# Patient Record
Sex: MALE | Race: ASIAN | NOT HISPANIC OR LATINO
[De-identification: names, ages, dates, MRNs, and addresses within clinical notes are randomized per-mention and may not be internally consistent; named-entity substitution may affect disease eponyms.]

---

## 2024-09-19 VITALS
HEART RATE: 72 BPM | TEMPERATURE: 97 F | DIASTOLIC BLOOD PRESSURE: 65 MMHG | SYSTOLIC BLOOD PRESSURE: 123 MMHG | OXYGEN SATURATION: 99 % | WEIGHT: 141.98 LBS | HEIGHT: 66 IN

## 2024-09-19 NOTE — H&P ADULT - HISTORY OF PRESENT ILLNESS
INCOMPLETE    Cardiologist:  Dr. Vazquez  Pharmacy:   Escort:     58M PMHx HTN, HLD, DM-2, CAD, severe MR/prolapse s/p MVR 2011 ([facility]),  who presented to outpt cardiologist endorsing intermittent substernal chest pressure, non-radiating, occurring upon moderate physical exertion, which improves w/ rest, that has been gradually worsening over the past few weeks. Pt also reported dizziness.   Pt _______ SOB, ELLIOTT,  palpitations, diaphoresis, orthopnea/PND, cough, leg swelling, LOC, fall, syncope, N/V/D, fever/chills/sick contact, rash, hematuria, BRBPR, melena/hematochezia.    -- 7/21/24 TTE: LVEF 64%, Severe MS (MV area 0/87cm2; mean gradient 15mmHg), mild MR/TR, mod dil LA, mild pulmHTN (PASP 46mmHg).   -- 8/11/24 Stress Echo: Negative stress echo for myocardial ischemia. Increased LV filling pressures at rest and post-stress. LVEF 60% at rest and 75% with stress. INCOMPLETE    Cardiologist:  Dr. Vazquez  Pharmacy:   Escort:     ***needs MARLEEN *******    58M, _____ speaking, PMHx HTN, HLD, DM-2, CAD, severe MR/prolapse s/p MV repair (2011 @[facility]),  who presented to outpt cardiologist endorsing intermittent substernal chest pressure, non-radiating, occurring upon moderate physical exertion, which improves w/ rest, that has been gradually worsening over the past few weeks. Pt also reported SOB/ELLIOTT with light exertion, and dizziness/lightheadedness.   Pt _______ palpitations, diaphoresis, orthopnea/PND, cough, leg swelling, LOC, fall, syncope, N/V/D, fever/chills/sick contact, rash, hematuria, BRBPR, melena/hematochezia.    -- 7/21/24 TTE: LVEF 64%; s/p MV Ring with Severe MS (MV area 0/87cm2; mean gradient 15mmHg); mild MR/TR; mod dil LA, bowing of intra-atrial septum to left; mild pulmHTN (PASP 46mmHg).   -- 8/11/24 Stress Echo: Negative stress echo for myocardial ischemia. Normal wall motion; no arrythmia; pt achieved protocol.  Increased PASP and LV filling pressures at rest and post-stress. LVEF 60% at rest and 75% with stress.  -- 07/2024 Carotid US per MD note: mild plaque, patent flows b/l     In light of patient's risk factors, abnormal Stress Echo results, and CCS class III anginal/anginal-equivalent symptoms, patient is referred to [facility] for cardiac catheterization w/ possible intervention if clinically indicated and MARLEEN for preoperative MVR workup. Cardiologist:  Dr. Vazquez  Pharmacy: [facility]  Escort: son/wife    58M PMHx HTN, HLD, DM-2, CAD, severe MR/prolapse s/p MV repair (2011 @[facility]),  who presented to outpt cardiologist endorsing intermittent substernal chest pressure, non-radiating, occurring upon moderate physical exertion, which improves w/ rest, that has been gradually worsening over the past few weeks. Pt also reported SOB/ELLIOTT with light exertion, and dizziness/lightheadedness.   Pt denies palpitations, diaphoresis, orthopnea/PND, cough, leg swelling, LOC, fall, syncope, N/V/D, fever/chills/sick contact, rash, hematuria, BRBPR, melena/hematochezia.    -- 7/21/24 TTE: LVEF 64%; s/p MV Ring with Severe MS (MV area 0/87cm2; mean gradient 15mmHg); mild MR/TR; mod dil LA, bowing of intra-atrial septum to left; mild pulmHTN (PASP 46mmHg).   -- 8/11/24 Stress Echo: Negative stress echo for myocardial ischemia. Normal wall motion; no arrythmia; pt achieved protocol.  Increased PASP and LV filling pressures at rest and post-stress. LVEF 60% at rest and 75% with stress.  -- 07/2024 Carotid US per MD note: mild plaque, patent flows b/l     In light of patient's risk factors, abnormal Stress Echo results, and CCS class III anginal/anginal-equivalent symptoms, patient is referred to [facility] for cardiac catheterization w/ possible intervention if clinically indicated and MARLEEN for preoperative MVR workup.

## 2024-09-19 NOTE — H&P ADULT - NSICDXPASTMEDICALHX_GEN_ALL_CORE_FT
PAST MEDICAL HISTORY:  CAD (coronary artery disease)     DM2 (diabetes mellitus, type 2)     HLD (hyperlipidemia)     HTN (hypertension)     Severe mitral valve stenosis

## 2024-09-19 NOTE — H&P ADULT - CARDIOVASCULAR
normal/regular rate and rhythm/S1 S2 present/no gallops/no rub/no murmur/vascular normal/regular rate and rhythm/S1 S2 present/no gallops/no rub/murmur/vascular

## 2024-09-19 NOTE — H&P ADULT - ASSESSMENT
58M PMHx HTN, HLD, DM-2, CAD, severe MR[facility]  who presents to [facility] for cardiac cath with possible intervention in light of pt risk factors, CCS class III anginal symptoms and abnormal stress echo.   Pt to obtain MARLEEN prior to [facility] for preoperative MVR workup.     EKG: NSR with HR of 78bpm			  ASA: III  Mallampati class: III   Anginal Class: III    -No Known Allergies    -H/H = 13.6/42.0  . Pt denies BRBPR, hematuria, hematochezia, melena. Pt endorses compliance w/ home aspirin, stating last dose was yesterday 9/24/24. Pt loaded with ASA 81mg x1 and Plavix 600mg x1.  - EF=64%. Euvolemic on exam. IV NS @250 cc bolus followed by 75 cc/hr x 2 hrs started pre procedure    Sedation Plan:   Moderate  Patient Is Suitable Candidate For Sedation?     Yes    Risks & benefits of procedure and alternative therapy have been explained to the patient including but not limited to: allergic reaction, bleeding with possible need for blood transfusion, infection, renal and vascular compromise, limb damage, arrhythmia, stroke, vessel dissection/perforation, myocardial infarction, and emergent CABG. Informed consent obtained at bedside and included in chart. 58M PMHx HTN, HLD, DM-2, CAD, severe MR[facility]lapse s[facility]  who presents to [facility] for cardiac cath with possible intervention in light of pt risk factors, CCS class III anginal symptoms and abnormal stress echo.   Pt to obtain MARLEEN prior to [facility] for preoperative MVR workup.     EKG: NSR with HR of 78bpm			  ASA: III  Mallampati class: III   Anginal Class: III    -No Known Allergies    -H/H = 13.6/42.0  . Pt denies BRBPR, hematuria, hematochezia, melena. Pt endorses compliance w/ home aspirin, stating last dose was yesterday 9/24/24. Pt loaded with ASA 81mg x1 and Plavix 600mg x1.  - EF=64%. Euvolemic on exam. IV NS 50cc/hr x 2 hrs started pre procedure    Sedation Plan:   Moderate  Patient Is Suitable Candidate For Sedation?     Yes    Risks & benefits of procedure and alternative therapy have been explained to the patient including but not limited to: allergic reaction, bleeding with possible need for blood transfusion, infection, renal and vascular compromise, limb damage, arrhythmia, stroke, vessel dissection/perforation, myocardial infarction, and emergent CABG. Informed consent obtained at bedside and included in chart.

## 2024-09-19 NOTE — H&P ADULT - NSHPLABSRESULTS_GEN_ALL_CORE
13.6   6.03  )-----------( 222      ( 25 Sep 2024 07:54 )             42.0         Mg     2.0     09-25        PT/INR - ( 25 Sep 2024 07:54 )   PT: 10.7 sec;   INR: 0.93          PTT - ( 25 Sep 2024 07:54 )  PTT:27.2 sec

## 2024-09-25 ENCOUNTER — RESULT REVIEW (OUTPATIENT)
Age: 59
End: 2024-09-25

## 2024-09-25 ENCOUNTER — OUTPATIENT (OUTPATIENT)
Dept: OUTPATIENT SERVICES | Facility: HOSPITAL | Age: 59
LOS: 1 days | Discharge: HOME | End: 2024-09-25
Payer: COMMERCIAL

## 2024-09-25 DIAGNOSIS — R94.39 ABNORMAL RESULT OF OTHER CARDIOVASCULAR FUNCTION STUDY: ICD-10-CM

## 2024-09-25 DIAGNOSIS — I25.118 ATHEROSCLEROTIC HEART DISEASE OF NATIVE CORONARY ARTERY WITH OTHER FORMS OF ANGINA PECTORIS: ICD-10-CM

## 2024-09-25 DIAGNOSIS — Z98.890 OTHER SPECIFIED POSTPROCEDURAL STATES: Chronic | ICD-10-CM

## 2024-09-25 DIAGNOSIS — I34.2 NONRHEUMATIC MITRAL (VALVE) STENOSIS: ICD-10-CM

## 2024-09-25 LAB
A1C WITH ESTIMATED AVERAGE GLUCOSE RESULT: 7.1 % — HIGH (ref 4–5.6)
ALBUMIN SERPL ELPH-MCNC: 4.3 G/DL — SIGNIFICANT CHANGE UP (ref 3.3–5)
ALP SERPL-CCNC: 58 U/L — SIGNIFICANT CHANGE UP (ref 40–120)
ALT FLD-CCNC: 17 U/L — SIGNIFICANT CHANGE UP (ref 10–45)
ANION GAP SERPL CALC-SCNC: 10 MMOL/L — SIGNIFICANT CHANGE UP (ref 5–17)
APTT BLD: 27.2 SEC — SIGNIFICANT CHANGE UP (ref 24.5–35.6)
AST SERPL-CCNC: 18 U/L — SIGNIFICANT CHANGE UP (ref 10–40)
BASOPHILS # BLD AUTO: 0.08 K/UL — SIGNIFICANT CHANGE UP (ref 0–0.2)
BASOPHILS NFR BLD AUTO: 1.3 % — SIGNIFICANT CHANGE UP (ref 0–2)
BILIRUB SERPL-MCNC: 0.4 MG/DL — SIGNIFICANT CHANGE UP (ref 0.2–1.2)
BUN SERPL-MCNC: 18 MG/DL — SIGNIFICANT CHANGE UP (ref 7–23)
CALCIUM SERPL-MCNC: 9.4 MG/DL — SIGNIFICANT CHANGE UP (ref 8.4–10.5)
CHLORIDE SERPL-SCNC: 104 MMOL/L — SIGNIFICANT CHANGE UP (ref 96–108)
CHOLEST SERPL-MCNC: 139 MG/DL — SIGNIFICANT CHANGE UP
CK MB CFR SERPL CALC: 1.6 NG/ML — SIGNIFICANT CHANGE UP (ref 0–6.7)
CK SERPL-CCNC: 160 U/L — SIGNIFICANT CHANGE UP (ref 30–200)
CO2 SERPL-SCNC: 28 MMOL/L — SIGNIFICANT CHANGE UP (ref 22–31)
COHGB MFR BLDA: 1.8 % — SIGNIFICANT CHANGE UP
COHGB MFR BLDV: 1.7 % — SIGNIFICANT CHANGE UP
CREAT SERPL-MCNC: 0.87 MG/DL — SIGNIFICANT CHANGE UP (ref 0.5–1.3)
EGFR: 101 ML/MIN/1.73M2 — SIGNIFICANT CHANGE UP
EOSINOPHIL # BLD AUTO: 0.14 K/UL — SIGNIFICANT CHANGE UP (ref 0–0.5)
EOSINOPHIL NFR BLD AUTO: 2.3 % — SIGNIFICANT CHANGE UP (ref 0–6)
ESTIMATED AVERAGE GLUCOSE: 157 MG/DL — HIGH (ref 68–114)
GLUCOSE BLDC GLUCOMTR-MCNC: 110 MG/DL — HIGH (ref 70–99)
GLUCOSE BLDC GLUCOMTR-MCNC: 134 MG/DL — HIGH (ref 70–99)
GLUCOSE SERPL-MCNC: 141 MG/DL — HIGH (ref 70–99)
HCT VFR BLD CALC: 42 % — SIGNIFICANT CHANGE UP (ref 39–50)
HDLC SERPL-MCNC: 49 MG/DL — SIGNIFICANT CHANGE UP
HGB BLD CALC-MCNC: 13.1 G/DL — SIGNIFICANT CHANGE UP (ref 12.6–17.4)
HGB BLD-MCNC: 13.6 G/DL — SIGNIFICANT CHANGE UP (ref 13–17)
HGB BLDA-MCNC: 12.7 G/DL — SIGNIFICANT CHANGE UP (ref 12.6–17.4)
IMM GRANULOCYTES NFR BLD AUTO: 0.3 % — SIGNIFICANT CHANGE UP (ref 0–0.9)
INR BLD: 0.93 — SIGNIFICANT CHANGE UP (ref 0.85–1.16)
LIPID PNL WITH DIRECT LDL SERPL: 71 MG/DL — SIGNIFICANT CHANGE UP
LYMPHOCYTES # BLD AUTO: 2.07 K/UL — SIGNIFICANT CHANGE UP (ref 1–3.3)
LYMPHOCYTES # BLD AUTO: 34.3 % — SIGNIFICANT CHANGE UP (ref 13–44)
MAGNESIUM SERPL-MCNC: 2 MG/DL — SIGNIFICANT CHANGE UP (ref 1.6–2.6)
MCHC RBC-ENTMCNC: 32 PG — SIGNIFICANT CHANGE UP (ref 27–34)
MCHC RBC-ENTMCNC: 32.4 GM/DL — SIGNIFICANT CHANGE UP (ref 32–36)
MCV RBC AUTO: 98.8 FL — SIGNIFICANT CHANGE UP (ref 80–100)
METHGB MFR BLDA: 0.6 % — SIGNIFICANT CHANGE UP
METHGB MFR BLDV: 0.8 % — SIGNIFICANT CHANGE UP
MONOCYTES # BLD AUTO: 0.88 K/UL — SIGNIFICANT CHANGE UP (ref 0–0.9)
MONOCYTES NFR BLD AUTO: 14.6 % — HIGH (ref 2–14)
NEUTROPHILS # BLD AUTO: 2.84 K/UL — SIGNIFICANT CHANGE UP (ref 1.8–7.4)
NEUTROPHILS NFR BLD AUTO: 47.2 % — SIGNIFICANT CHANGE UP (ref 43–77)
NON HDL CHOLESTEROL: 90 MG/DL — SIGNIFICANT CHANGE UP
NRBC # BLD: 0 /100 WBCS — SIGNIFICANT CHANGE UP (ref 0–0)
OXYHGB MFR BLDA: 96 % — HIGH (ref 90–95)
PLATELET # BLD AUTO: 222 K/UL — SIGNIFICANT CHANGE UP (ref 150–400)
POTASSIUM SERPL-MCNC: 4.3 MMOL/L — SIGNIFICANT CHANGE UP (ref 3.5–5.3)
POTASSIUM SERPL-SCNC: 4.3 MMOL/L — SIGNIFICANT CHANGE UP (ref 3.5–5.3)
PROT SERPL-MCNC: 8 G/DL — SIGNIFICANT CHANGE UP (ref 6–8.3)
PROTHROM AB SERPL-ACNC: 10.7 SEC — SIGNIFICANT CHANGE UP (ref 9.9–13.4)
RBC # BLD: 4.25 M/UL — SIGNIFICANT CHANGE UP (ref 4.2–5.8)
RBC # FLD: 12.3 % — SIGNIFICANT CHANGE UP (ref 10.3–14.5)
SAO2 % BLDA: 98.4 % — HIGH (ref 94–98)
SAO2 % BLDV: 78 % — SIGNIFICANT CHANGE UP (ref 67–88)
SODIUM SERPL-SCNC: 142 MMOL/L — SIGNIFICANT CHANGE UP (ref 135–145)
TRIGL SERPL-MCNC: 104 MG/DL — SIGNIFICANT CHANGE UP
WBC # BLD: 6.03 K/UL — SIGNIFICANT CHANGE UP (ref 3.8–10.5)
WBC # FLD AUTO: 6.03 K/UL — SIGNIFICANT CHANGE UP (ref 3.8–10.5)

## 2024-09-25 PROCEDURE — 76376 3D RENDER W/INTRP POSTPROCES: CPT | Mod: 26

## 2024-09-25 PROCEDURE — 99152 MOD SED SAME PHYS/QHP 5/>YRS: CPT

## 2024-09-25 PROCEDURE — 80053 COMPREHEN METABOLIC PANEL: CPT

## 2024-09-25 PROCEDURE — C1894: CPT

## 2024-09-25 PROCEDURE — 93325 DOPPLER ECHO COLOR FLOW MAPG: CPT | Mod: 26

## 2024-09-25 PROCEDURE — C1887: CPT

## 2024-09-25 PROCEDURE — 80061 LIPID PANEL: CPT

## 2024-09-25 PROCEDURE — 93460 R&L HRT ART/VENTRICLE ANGIO: CPT | Mod: 26

## 2024-09-25 PROCEDURE — 85025 COMPLETE CBC W/AUTO DIFF WBC: CPT

## 2024-09-25 PROCEDURE — 83036 HEMOGLOBIN GLYCOSYLATED A1C: CPT

## 2024-09-25 PROCEDURE — 93460 R&L HRT ART/VENTRICLE ANGIO: CPT

## 2024-09-25 PROCEDURE — 85610 PROTHROMBIN TIME: CPT

## 2024-09-25 PROCEDURE — 93312 ECHO TRANSESOPHAGEAL: CPT | Mod: 26

## 2024-09-25 PROCEDURE — 36415 COLL VENOUS BLD VENIPUNCTURE: CPT

## 2024-09-25 PROCEDURE — 83735 ASSAY OF MAGNESIUM: CPT

## 2024-09-25 PROCEDURE — 93312 ECHO TRANSESOPHAGEAL: CPT

## 2024-09-25 PROCEDURE — 85730 THROMBOPLASTIN TIME PARTIAL: CPT

## 2024-09-25 PROCEDURE — 93320 DOPPLER ECHO COMPLETE: CPT | Mod: 26

## 2024-09-25 PROCEDURE — 82553 CREATINE MB FRACTION: CPT

## 2024-09-25 PROCEDURE — 82803 BLOOD GASES ANY COMBINATION: CPT

## 2024-09-25 PROCEDURE — 82962 GLUCOSE BLOOD TEST: CPT

## 2024-09-25 PROCEDURE — 82550 ASSAY OF CK (CPK): CPT

## 2024-09-25 PROCEDURE — C1769: CPT

## 2024-09-25 RX ORDER — ASPIRIN 325 MG
1 TABLET ORAL
Refills: 0 | DISCHARGE

## 2024-09-25 RX ORDER — ASPIRIN 325 MG
81 TABLET ORAL DAILY
Refills: 0 | Status: DISCONTINUED | OUTPATIENT
Start: 2024-09-25 | End: 2024-10-09

## 2024-09-25 RX ORDER — SODIUM CHLORIDE 0.9 % (FLUSH) 0.9 %
1000 SYRINGE (ML) INJECTION
Refills: 0 | Status: DISCONTINUED | OUTPATIENT
Start: 2024-09-25 | End: 2024-10-09

## 2024-09-25 RX ORDER — METOPROLOL TARTRATE 50 MG
1 TABLET ORAL
Refills: 0 | DISCHARGE

## 2024-09-25 RX ORDER — TAMSULOSIN HCL 0.4 MG
1 CAPSULE ORAL
Refills: 0 | DISCHARGE

## 2024-09-25 RX ORDER — SITAGLIPTIN PHOSPHATE 100 MG
1 TABLET ORAL
Refills: 0 | DISCHARGE

## 2024-09-25 RX ORDER — METFORMIN HCL 500 MG
1 TABLET ORAL
Refills: 0 | DISCHARGE

## 2024-09-25 RX ORDER — SIMVASTATIN 20 MG
1 TABLET ORAL
Refills: 0 | DISCHARGE

## 2024-09-25 RX ADMIN — Medication 600 MILLIGRAM(S): at 08:47

## 2024-09-25 RX ADMIN — Medication 81 MILLIGRAM(S): at 08:50

## 2024-09-25 NOTE — PROGRESS NOTE ADULT - SUBJECTIVE AND OBJECTIVE BOX
Interventional Cardiology PA SDA Discharge Note    Patient without complaints. Ambulated and voided without difficulties  Afebrile, VSS  Ext:  Right Radial : no   hematoma,  no   bleeding, dressing; C/D/I  Pulses:    intact RAD to baseline     A/P:    s/p diagnostic LHC (9/25/24): LM normal, LA Dmild LI, LCx mild LI (dominant), RCA normal; LVEDP 10mmHg.   ACCESS: Right radial artery   Post cath IVF: NS 100cc/hr x2hrs.     s/p RHC (9/25/24) consistent with mild pulmonary hypertension (full report pending; refer to [place])  ACCESS: R brachial vein    1.	Stable for discharge as per attending Dr. Vazquez after bed rest, pt voids, groin/wrist stable and 30 minutes of ambulation.  2.	Follow-up with PMD/Cardiologist Dr. Vazquez in 1-2 weeks  3.	Discharged forms signed and copies in chart

## 2024-09-26 VITALS — HEIGHT: 65.98 IN | BODY MASS INDEX: 22.82 KG/M2 | WEIGHT: 141.98 LBS

## 2024-09-26 DIAGNOSIS — Z87.438 PERSONAL HISTORY OF OTHER DISEASES OF MALE GENITAL ORGANS: ICD-10-CM

## 2024-09-26 DIAGNOSIS — Z86.39 PERSONAL HISTORY OF OTHER ENDOCRINE, NUTRITIONAL AND METABOLIC DISEASE: ICD-10-CM

## 2024-09-26 DIAGNOSIS — Z98.890 OTHER SPECIFIED POSTPROCEDURAL STATES: ICD-10-CM

## 2024-09-26 DIAGNOSIS — Z86.79 PERSONAL HISTORY OF OTHER DISEASES OF THE CIRCULATORY SYSTEM: ICD-10-CM

## 2024-09-26 PROBLEM — E11.9 TYPE 2 DIABETES MELLITUS WITHOUT COMPLICATIONS: Chronic | Status: ACTIVE | Noted: 2024-09-20

## 2024-09-26 PROBLEM — I25.10 ATHEROSCLEROTIC HEART DISEASE OF NATIVE CORONARY ARTERY WITHOUT ANGINA PECTORIS: Chronic | Status: ACTIVE | Noted: 2024-09-20

## 2024-09-26 PROBLEM — I10 ESSENTIAL (PRIMARY) HYPERTENSION: Chronic | Status: ACTIVE | Noted: 2024-09-20

## 2024-09-26 PROBLEM — Z00.00 ENCOUNTER FOR PREVENTIVE HEALTH EXAMINATION: Status: ACTIVE | Noted: 2024-09-26

## 2024-09-26 PROBLEM — E78.5 HYPERLIPIDEMIA, UNSPECIFIED: Chronic | Status: ACTIVE | Noted: 2024-09-20

## 2024-09-26 PROBLEM — I05.0 RHEUMATIC MITRAL STENOSIS: Chronic | Status: ACTIVE | Noted: 2024-09-20

## 2024-09-26 RX ORDER — SIMVASTATIN 20 MG/1
20 TABLET, FILM COATED ORAL
Qty: 30 | Refills: 3 | Status: ACTIVE | COMMUNITY

## 2024-09-26 RX ORDER — METOPROLOL TARTRATE 25 MG/1
25 TABLET ORAL TWICE DAILY
Qty: 60 | Refills: 2 | Status: ACTIVE | COMMUNITY

## 2024-09-26 RX ORDER — TAMSULOSIN HYDROCHLORIDE 0.4 MG/1
0.4 CAPSULE ORAL
Qty: 90 | Refills: 1 | Status: ACTIVE | COMMUNITY

## 2024-09-26 RX ORDER — SITAGLIPTIN 25 MG/1
25 TABLET, FILM COATED ORAL DAILY
Refills: 0 | Status: ACTIVE | COMMUNITY

## 2024-09-26 RX ORDER — METFORMIN HYDROCHLORIDE 500 MG/1
500 TABLET, COATED ORAL
Qty: 60 | Refills: 0 | Status: ACTIVE | COMMUNITY

## 2024-09-26 RX ORDER — ASPIRIN 81 MG
81 TABLET, DELAYED RELEASE (ENTERIC COATED) ORAL DAILY
Qty: 30 | Refills: 6 | Status: ACTIVE | COMMUNITY

## 2024-09-30 ENCOUNTER — NON-APPOINTMENT (OUTPATIENT)
Age: 59
End: 2024-09-30

## 2024-10-01 ENCOUNTER — OUTPATIENT (OUTPATIENT)
Dept: OUTPATIENT SERVICES | Facility: HOSPITAL | Age: 59
LOS: 1 days | End: 2024-10-01
Payer: COMMERCIAL

## 2024-10-01 ENCOUNTER — APPOINTMENT (OUTPATIENT)
Dept: CARDIOTHORACIC SURGERY | Facility: CLINIC | Age: 59
End: 2024-10-01
Payer: COMMERCIAL

## 2024-10-01 VITALS
HEIGHT: 67 IN | OXYGEN SATURATION: 96 % | SYSTOLIC BLOOD PRESSURE: 99 MMHG | TEMPERATURE: 97.9 F | DIASTOLIC BLOOD PRESSURE: 55 MMHG | HEART RATE: 77 BPM | BODY MASS INDEX: 22.29 KG/M2 | WEIGHT: 142 LBS

## 2024-10-01 DIAGNOSIS — Z98.890 OTHER SPECIFIED POSTPROCEDURAL STATES: Chronic | ICD-10-CM

## 2024-10-01 DIAGNOSIS — I05.0 RHEUMATIC MITRAL STENOSIS: ICD-10-CM

## 2024-10-01 LAB
A1C WITH ESTIMATED AVERAGE GLUCOSE RESULT: 7.1 % — HIGH (ref 4–5.6)
ALBUMIN SERPL ELPH-MCNC: 4.4 G/DL — SIGNIFICANT CHANGE UP (ref 3.3–5)
ALP SERPL-CCNC: 62 U/L — SIGNIFICANT CHANGE UP (ref 40–120)
ALT FLD-CCNC: 13 U/L — SIGNIFICANT CHANGE UP (ref 10–45)
ANION GAP SERPL CALC-SCNC: 11 MMOL/L — SIGNIFICANT CHANGE UP (ref 5–17)
APPEARANCE UR: CLEAR — SIGNIFICANT CHANGE UP
APTT BLD: 29.3 SEC — SIGNIFICANT CHANGE UP (ref 24.5–35.6)
AST SERPL-CCNC: 16 U/L — SIGNIFICANT CHANGE UP (ref 10–40)
BASOPHILS # BLD AUTO: 0.05 K/UL — SIGNIFICANT CHANGE UP (ref 0–0.2)
BASOPHILS NFR BLD AUTO: 0.9 % — SIGNIFICANT CHANGE UP (ref 0–2)
BILIRUB SERPL-MCNC: 0.3 MG/DL — SIGNIFICANT CHANGE UP (ref 0.2–1.2)
BILIRUB UR-MCNC: NEGATIVE — SIGNIFICANT CHANGE UP
BUN SERPL-MCNC: 24 MG/DL — HIGH (ref 7–23)
CALCIUM SERPL-MCNC: 9.1 MG/DL — SIGNIFICANT CHANGE UP (ref 8.4–10.5)
CHLORIDE SERPL-SCNC: 102 MMOL/L — SIGNIFICANT CHANGE UP (ref 96–108)
CHOLEST SERPL-MCNC: 127 MG/DL — SIGNIFICANT CHANGE UP
CO2 SERPL-SCNC: 26 MMOL/L — SIGNIFICANT CHANGE UP (ref 22–31)
COLOR SPEC: YELLOW — SIGNIFICANT CHANGE UP
CREAT SERPL-MCNC: 0.87 MG/DL — SIGNIFICANT CHANGE UP (ref 0.5–1.3)
DIFF PNL FLD: NEGATIVE — SIGNIFICANT CHANGE UP
EGFR: 101 ML/MIN/1.73M2 — SIGNIFICANT CHANGE UP
EOSINOPHIL # BLD AUTO: 0.11 K/UL — SIGNIFICANT CHANGE UP (ref 0–0.5)
EOSINOPHIL NFR BLD AUTO: 2 % — SIGNIFICANT CHANGE UP (ref 0–6)
ESTIMATED AVERAGE GLUCOSE: 157 MG/DL — HIGH (ref 68–114)
GLUCOSE SERPL-MCNC: 224 MG/DL — HIGH (ref 70–99)
GLUCOSE UR QL: 100 MG/DL
HBV SURFACE AG SER-ACNC: SIGNIFICANT CHANGE UP
HCT VFR BLD CALC: 41.7 % — SIGNIFICANT CHANGE UP (ref 39–50)
HDLC SERPL-MCNC: 42 MG/DL — SIGNIFICANT CHANGE UP
HGB BLD-MCNC: 13.7 G/DL — SIGNIFICANT CHANGE UP (ref 13–17)
IMM GRANULOCYTES NFR BLD AUTO: 0.2 % — SIGNIFICANT CHANGE UP (ref 0–0.9)
INR BLD: 1.01 — SIGNIFICANT CHANGE UP (ref 0.85–1.16)
KETONES UR-MCNC: NEGATIVE MG/DL — SIGNIFICANT CHANGE UP
LEUKOCYTE ESTERASE UR-ACNC: NEGATIVE — SIGNIFICANT CHANGE UP
LIPID PNL WITH DIRECT LDL SERPL: 62 MG/DL — SIGNIFICANT CHANGE UP
LYMPHOCYTES # BLD AUTO: 1.53 K/UL — SIGNIFICANT CHANGE UP (ref 1–3.3)
LYMPHOCYTES # BLD AUTO: 28.5 % — SIGNIFICANT CHANGE UP (ref 13–44)
MCHC RBC-ENTMCNC: 32.9 GM/DL — SIGNIFICANT CHANGE UP (ref 32–36)
MCHC RBC-ENTMCNC: 33 PG — SIGNIFICANT CHANGE UP (ref 27–34)
MCV RBC AUTO: 100.5 FL — HIGH (ref 80–100)
MONOCYTES # BLD AUTO: 0.54 K/UL — SIGNIFICANT CHANGE UP (ref 0–0.9)
MONOCYTES NFR BLD AUTO: 10.1 % — SIGNIFICANT CHANGE UP (ref 2–14)
NEUTROPHILS # BLD AUTO: 3.13 K/UL — SIGNIFICANT CHANGE UP (ref 1.8–7.4)
NEUTROPHILS NFR BLD AUTO: 58.3 % — SIGNIFICANT CHANGE UP (ref 43–77)
NITRITE UR-MCNC: NEGATIVE — SIGNIFICANT CHANGE UP
NON HDL CHOLESTEROL: 85 MG/DL — SIGNIFICANT CHANGE UP
NRBC # BLD: 0 /100 WBCS — SIGNIFICANT CHANGE UP (ref 0–0)
PH UR: 6 — SIGNIFICANT CHANGE UP (ref 5–8)
PLATELET # BLD AUTO: 213 K/UL — SIGNIFICANT CHANGE UP (ref 150–400)
POTASSIUM SERPL-MCNC: 4.1 MMOL/L — SIGNIFICANT CHANGE UP (ref 3.5–5.3)
POTASSIUM SERPL-SCNC: 4.1 MMOL/L — SIGNIFICANT CHANGE UP (ref 3.5–5.3)
PROT SERPL-MCNC: 7.9 G/DL — SIGNIFICANT CHANGE UP (ref 6–8.3)
PROT UR-MCNC: NEGATIVE MG/DL — SIGNIFICANT CHANGE UP
PROTHROM AB SERPL-ACNC: 11.6 SEC — SIGNIFICANT CHANGE UP (ref 9.9–13.4)
RBC # BLD: 4.15 M/UL — LOW (ref 4.2–5.8)
RBC # FLD: 12.3 % — SIGNIFICANT CHANGE UP (ref 10.3–14.5)
SODIUM SERPL-SCNC: 139 MMOL/L — SIGNIFICANT CHANGE UP (ref 135–145)
SP GR SPEC: 1.02 — SIGNIFICANT CHANGE UP (ref 1–1.03)
TRIGL SERPL-MCNC: 131 MG/DL — SIGNIFICANT CHANGE UP
TSH SERPL-MCNC: 1.88 UIU/ML — SIGNIFICANT CHANGE UP (ref 0.27–4.2)
UROBILINOGEN FLD QL: 0.2 MG/DL — SIGNIFICANT CHANGE UP (ref 0.2–1)
WBC # BLD: 5.37 K/UL — SIGNIFICANT CHANGE UP (ref 3.8–10.5)
WBC # FLD AUTO: 5.37 K/UL — SIGNIFICANT CHANGE UP (ref 3.8–10.5)

## 2024-10-01 PROCEDURE — 80053 COMPREHEN METABOLIC PANEL: CPT

## 2024-10-01 PROCEDURE — 85610 PROTHROMBIN TIME: CPT

## 2024-10-01 PROCEDURE — 83036 HEMOGLOBIN GLYCOSYLATED A1C: CPT

## 2024-10-01 PROCEDURE — 85730 THROMBOPLASTIN TIME PARTIAL: CPT

## 2024-10-01 PROCEDURE — 86900 BLOOD TYPING SEROLOGIC ABO: CPT

## 2024-10-01 PROCEDURE — 86901 BLOOD TYPING SEROLOGIC RH(D): CPT

## 2024-10-01 PROCEDURE — 71046 X-RAY EXAM CHEST 2 VIEWS: CPT | Mod: 26

## 2024-10-01 PROCEDURE — 81003 URINALYSIS AUTO W/O SCOPE: CPT

## 2024-10-01 PROCEDURE — 87340 HEPATITIS B SURFACE AG IA: CPT

## 2024-10-01 PROCEDURE — 36415 COLL VENOUS BLD VENIPUNCTURE: CPT

## 2024-10-01 PROCEDURE — 84443 ASSAY THYROID STIM HORMONE: CPT

## 2024-10-01 PROCEDURE — 86850 RBC ANTIBODY SCREEN: CPT

## 2024-10-01 PROCEDURE — 71046 X-RAY EXAM CHEST 2 VIEWS: CPT

## 2024-10-01 PROCEDURE — 99205 OFFICE O/P NEW HI 60 MIN: CPT

## 2024-10-01 PROCEDURE — 80061 LIPID PANEL: CPT

## 2024-10-01 PROCEDURE — 85025 COMPLETE CBC W/AUTO DIFF WBC: CPT

## 2024-10-02 PROBLEM — I05.0 MITRAL VALVE STENOSIS, SEVERE: Status: ACTIVE | Noted: 2024-09-26

## 2024-10-02 RX ORDER — MULTIVIT-MIN/FOLIC/VIT K/LYCOP 400-300MCG
500 TABLET ORAL
Qty: 4 | Refills: 0 | Status: ACTIVE | COMMUNITY
Start: 1900-01-01 | End: 1900-01-01

## 2024-10-03 ENCOUNTER — TRANSCRIPTION ENCOUNTER (OUTPATIENT)
Age: 59
End: 2024-10-03

## 2024-10-03 ENCOUNTER — NON-APPOINTMENT (OUTPATIENT)
Age: 59
End: 2024-10-03

## 2024-10-03 NOTE — END OF VISIT
[Time Spent: ___ minutes] : I have spent [unfilled] minutes of time on the encounter which excludes teaching and separately reported services. [FreeTextEntry3] :   I, Dr. DUNN [facility], personally performed the evaluation and management (E/M) services for this new patient.  That E/M includes conducting the clinically appropriate initial history &/or exam, assessing all conditions, and establishing the plan of care.  Today, my MIKE, was here to observe &/or participate in the visit & follow plan of care established by me.

## 2024-10-03 NOTE — DATA REVIEWED
[FreeTextEntry1] : 9/15/24 EKG: SR, 78bpm  0721/24 Carotid US: 1. <50% stenosis in the bilateral CCA Mild (20-49%) stenosis in the bilateral ICA. Mild plaque.  2. both vertebral arteries are patent with antegrade flow   7/21/24 TTE: LVEF 64%; s/p MV Ring with Severe MS (MV area 0/87cm2; mean gradient 15mmHg); mild MR/TR; mod dil LA, bowing of intra-atrial septum to left; mild pulm HTN (PASP 46mmHg).   8/11/24 Stress Echo: Negative stress echo for myocardial ischemia. Normal wall motion; no arrythmia; pt achieved protocol.  Increased PASP and LV filling pressures at rest and post-stress. LVEF 60% at rest and 75% with stress.  9/25/24 MARLEEN: 1. Normal left ventricular size and systolic function.  2. Normal right ventricular size and systolic function.  3. No LA/RA/ОЛЕГ/RAA thrombus seen.  4. No evidence of an intracardiac shunt.  5. An annuloplasty ring is noted in the mitral position. The mitral valve is moderately thickened. The P2 scallop is flail.  6. There is severe mitral stenosis. The mean transvalvular gradient is 11.00 mmHg at a heart rate of 70 bpm. The mitral valve area estimated via 3D MARLEEN planimetry is 1.10 - 1.5 cm.  7. Mild mitral regurgitation (intra-valvular).  8. No evidence of pulmonary hypertension.  9. No pericardial effusion. 10. No prior echo is available for comparison.  9/25/24 Cardiac cath:  LM normal, LA Dmild LI, LCx mild LI (dominant), RCA normal; LVEDP 10mmHg.

## 2024-10-03 NOTE — HISTORY OF PRESENT ILLNESS
[FreeTextEntry1] : 59 yo male with PMHx HTN, HLD, DM-2, mild CAD and mitral valve prolapse s/p MV repair w/28mm Jaymie ring in 2011 @ [facility] presents with severe mitral valve stenosis. Patient presented to his cardiologist, Dr. Vazquez, with c/o intermittent substernal chest pressure, non-radiating, occurring upon moderate physical exertion, which improves w/ rest, that has been gradually worsening over the past few weeks. Pt also reported SOB/ELLIOTT with light exertion, and dizziness/lightheadedness. NYHA 3. Pt denies palpitations, diaphoresis, orthopnea/PND, cough, leg swelling, LOC, fall, syncope, N/V/D, fever/chills/sick contact, rash, hematuria, BRBPR, melena/hematochezia.  7/21/24 TTE: LVEF 64%; s/p MV Ring with Severe MS. 8/11/24 Stress echo negative for ischemia. 9/25/24 Cardiac cath revealed non-obstructive CAD. 9/25/24 MARLEEN revealed severe mitral stenosis, mild MR, normal LV function.  Patient presents today for surgical consult with Dr. Hernandez accompanied by his wife and son. He appears generally well, NAD. 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

## 2024-10-03 NOTE — HISTORY OF PRESENT ILLNESS
[FreeTextEntry1] : 59 yo male with PMHx HTN, HLD, DM-2, mild CAD and mitral valve prolapse s/p MV repair w/28mm Jaymie ring in 2011 @ [facility] presents with severe mitral valve stenosis. Patient presented to his cardiologist, Dr. Vazquez, with c/o intermittent substernal chest pressure, non-radiating, occurring upon moderate physical exertion, which improves w/ rest, that has been gradually worsening over the past few weeks. Pt also reported SOB/ELLIOTT with light exertion, and dizziness/lightheadedness. NYHA 3. Pt denies palpitations, diaphoresis, orthopnea/PND, cough, leg swelling, LOC, fall, syncope, N/V/D, fever/chills/sick contact, rash, hematuria, BRBPR, melena/hematochezia.  7/21/24 TTE: LVEF 64%; s/p MV Ring with Severe MS. 8/11/24 Stress echo negative for ischemia. 9/25/24 Cardiac cath revealed non-obstructive CAD. 9/25/24 MARLEEN revealed severe mitral stenosis, mild MR, normal LV function.  Patient presents today for surgical consult with Dr. Hernandez accompanied by his wife and son. He appears generally well, NAD. 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

## 2024-10-03 NOTE — PHYSICAL EXAM
[General Appearance - Alert] : alert [General Appearance - In No Acute Distress] : in no acute distress [Sclera] : the sclera and conjunctiva were normal [PERRL With Normal Accommodation] : pupils were equal in size, round, and reactive to light [Extraocular Movements] : extraocular movements were intact [Outer Ear] : the ears and nose were normal in appearance [Oropharynx] : the oropharynx was normal [Neck Appearance] : the appearance of the neck was normal [Jugular Venous Distention Increased] : there was no jugular-venous distention [Neck Cervical Mass (___cm)] : no neck mass was observed [Thyroid Diffuse Enlargement] : the thyroid was not enlarged [Thyroid Nodule] : there were no palpable thyroid nodules [Auscultation Breath Sounds / Voice Sounds] : lungs were clear to auscultation bilaterally [Heart Rate And Rhythm] : heart rate was normal and rhythm regular [2+] : left 2+ [No Abnormalities] : the abdominal aorta was not enlarged and no bruit was heard [Bowel Sounds] : normal bowel sounds [Abdomen Soft] : soft [Abdomen Tenderness] : non-tender [Abdomen Mass (___ Cm)] : no abdominal mass palpated [No CVA Tenderness] : no ~M costovertebral angle tenderness [No Spinal Tenderness] : no spinal tenderness [Nail Clubbing] : no clubbing  or cyanosis of the fingernails [Abnormal Walk] : normal gait [Musculoskeletal - Swelling] : no joint swelling seen [Motor Tone] : muscle strength and tone were normal [Skin Color & Pigmentation] : normal skin color and pigmentation [Skin Turgor] : normal skin turgor [] : no rash [Deep Tendon Reflexes (DTR)] : deep tendon reflexes were 2+ and symmetric [Sensation] : the sensory exam was normal to light touch and pinprick [No Focal Deficits] : no focal deficits [Oriented To Time, Place, And Person] : oriented to person, place, and time [Impaired Insight] : insight and judgment were intact [Affect] : the affect was normal [FreeTextEntry1] : prior sternotomy well healed, sternum stable [Right Carotid Bruit] : no bruit heard over the right carotid [Left Carotid Bruit] : no bruit heard over the left carotid [Right Femoral Bruit] : no bruit heard over the right femoral artery [Left Femoral Bruit] : no bruit heard over the left femoral artery

## 2024-10-03 NOTE — ASSESSMENT
[FreeTextEntry1] : 59 yo male with PMHx HTN, HLD, DM-2, mild CAD and mitral valve prolapse s/p MV repair w/28mm Jaymie ring presents with severe mitral valve stenosis.  Dr. Hernandez reviewed the cardiac cath images and echocardiogram images with the patient and his family and discussed the case with Dr. Nancy Hernandez and Dr. Airam Hernandez. Dr. Hernandez performed the STS risk calculator and quoted a 2-3% operative mortality and complication risk and discussed the STS risk factors with the patient including but not limited to death, heart attack, bleeding, stroke, kidney problems and infection. Dr. Hernandez also discussed surgical approaches, minimally invasive versus sternotomy.   Dr. Hernandez feels the patient will benefit from and is a candidate for Reop, Robotic, mitral valve replacement w/bioprosthesis.  All questions were addressed, and the patient agrees to proceed with surgery.   Plan:  PST--labs, xray, u/a, Blood drawn in office and reviewed in sunrise SDA 10/23 ascorbic acid 2gm at bedtime  on  10/22XXX 16oz Gatorade on the morning of surgery patient instructed to take Metoprolol on morning of surgery instructions provided re antibacterial showers and pt given 3 sponges pt instructed on use of the incentive spirometer and demonstrated use to 1500cc

## 2024-10-03 NOTE — END OF VISIT
[Time Spent: ___ minutes] : I have spent [unfilled] minutes of time on the encounter which excludes teaching and separately reported services. [FreeTextEntry3] :   I, Dr. DUNN [facility], personally performed the evaluation and management (E/M) services for this new patient.  That E/M includes conducting the clinically appropriate initial history &/or exam, assessing all conditions, and establishing the plan of care.  Today, my MIKE, was here to observe &/or participate in the visit & follow plan of care established by me.

## 2024-10-03 NOTE — ASSESSMENT
[FreeTextEntry1] : 57 yo male with PMHx HTN, HLD, DM-2, mild CAD and mitral valve prolapse s/p MV repair w/28mm Jaymie ring presents with severe mitral valve stenosis.  Dr. Hernandez reviewed the cardiac cath images and echocardiogram images with the patient and his family and discussed the case with Dr. Nancy Hernandez and Dr. Airam Hernandez. Dr. Hernandez performed the STS risk calculator and quoted a 2-3% operative mortality and complication risk and discussed the STS risk factors with the patient including but not limited to death, heart attack, bleeding, stroke, kidney problems and infection. Dr. Hernandez also discussed surgical approaches, minimally invasive versus sternotomy.   Dr. Hernandez feels the patient will benefit from and is a candidate for Reop, Robotic, mitral valve replacement w/bioprosthesis.  All questions were addressed, and the patient agrees to proceed with surgery.   Plan:  PST--labs, xray, u/a, Blood drawn in office and reviewed in sunrise SDA 10/23 ascorbic acid 2gm at bedtime  on  10/22XXX 16oz Gatorade on the morning of surgery patient instructed to take Metoprolol on morning of surgery instructions provided re antibacterial showers and pt given 3 sponges pt instructed on use of the incentive spirometer and demonstrated use to 1500cc

## 2024-10-03 NOTE — HISTORY OF PRESENT ILLNESS
[FreeTextEntry1] : 59 yo male with PMHx HTN, HLD, DM-2, mild CAD and mitral valve prolapse s/p MV repair w/28mm Jaymie ring in 2011 @ [facility] presents with severe mitral valve stenosis. Patient presented to his cardiologist, Dr. Vazquez, with c/o intermittent substernal chest pressure, non-radiating, occurring upon moderate physical exertion, which improves w/ rest, that has been gradually worsening over the past few weeks. Pt also reported SOB/ELLIOTT with light exertion, and dizziness/lightheadedness. NYHA 3. Pt denies palpitations, diaphoresis, orthopnea/PND, cough, leg swelling, LOC, fall, syncope, N/V/D, fever/chills/sick contact, rash, hematuria, BRBPR, melena/hematochezia.  7/21/24 TTE: LVEF 64%; s/p MV Ring with Severe MS. 8/11/24 Stress echo negative for ischemia. 9/25/24 Cardiac cath revealed non-obstructive CAD. 9/25/24 MARLEEN revealed severe mitral stenosis, mild MR, normal LV function.  Patient presents today for surgical consult with Dr. Hernandez accompanied by his wife and son. He appears generally well, NAD. 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

## 2024-10-03 NOTE — END OF VISIT
[Time Spent: ___ minutes] : I have spent [unfilled] minutes of time on the encounter which excludes teaching and separately reported services. [FreeTextEntry3] :   I, Dr. DUNN [facility], personally performed the evaluation and management (E/M) services for this new patient.  That E/M includes conducting the clinically appropriate initial history &/or exam, assessing all conditions, and establishing the plan of care.  Today, my MIKE, was here to observe &/or participate in the visit & follow plan of care established by me.

## 2024-10-03 NOTE — PHYSICAL EXAM
[General Appearance - Alert] : alert [General Appearance - In No Acute Distress] : in no acute distress [PERRL With Normal Accommodation] : pupils were equal in size, round, and reactive to light [Sclera] : the sclera and conjunctiva were normal [Extraocular Movements] : extraocular movements were intact [Outer Ear] : the ears and nose were normal in appearance [Oropharynx] : the oropharynx was normal [Neck Appearance] : the appearance of the neck was normal [Jugular Venous Distention Increased] : there was no jugular-venous distention [Neck Cervical Mass (___cm)] : no neck mass was observed [Thyroid Diffuse Enlargement] : the thyroid was not enlarged [Thyroid Nodule] : there were no palpable thyroid nodules [Auscultation Breath Sounds / Voice Sounds] : lungs were clear to auscultation bilaterally [Heart Rate And Rhythm] : heart rate was normal and rhythm regular [2+] : left 2+ [No Abnormalities] : the abdominal aorta was not enlarged and no bruit was heard [Bowel Sounds] : normal bowel sounds [Abdomen Soft] : soft [Abdomen Tenderness] : non-tender [Abdomen Mass (___ Cm)] : no abdominal mass palpated [No CVA Tenderness] : no ~M costovertebral angle tenderness [No Spinal Tenderness] : no spinal tenderness [Abnormal Walk] : normal gait [Nail Clubbing] : no clubbing  or cyanosis of the fingernails [Musculoskeletal - Swelling] : no joint swelling seen [Motor Tone] : muscle strength and tone were normal [Skin Color & Pigmentation] : normal skin color and pigmentation [Skin Turgor] : normal skin turgor [] : no rash [Deep Tendon Reflexes (DTR)] : deep tendon reflexes were 2+ and symmetric [Sensation] : the sensory exam was normal to light touch and pinprick [No Focal Deficits] : no focal deficits [Oriented To Time, Place, And Person] : oriented to person, place, and time [Impaired Insight] : insight and judgment were intact [Affect] : the affect was normal [FreeTextEntry1] : prior sternotomy well healed, sternum stable [Right Carotid Bruit] : no bruit heard over the right carotid [Left Carotid Bruit] : no bruit heard over the left carotid [Right Femoral Bruit] : no bruit heard over the right femoral artery [Left Femoral Bruit] : no bruit heard over the left femoral artery

## 2024-10-16 VITALS
DIASTOLIC BLOOD PRESSURE: 55 MMHG | OXYGEN SATURATION: 96 % | HEIGHT: 67 IN | TEMPERATURE: 98 F | WEIGHT: 141.98 LBS | HEART RATE: 77 BPM | SYSTOLIC BLOOD PRESSURE: 99 MMHG | RESPIRATION RATE: 16 BRPM

## 2024-10-16 NOTE — H&P ADULT - NSHPLABSRESULTS_GEN_ALL_CORE
WBC:5.37  HGB:13.7  HCT: 41.7  PLT: 213  INR:1.01  CREAT:0.87  ALB: 4.4  Tbil: 0.3  A1C: 7.1  TSH:1.880    10/1/24 Chest xray: clear lungs    9/15/24 EKG: SR, 78bpm  ?  0721/24 Carotid US:  1. <50% stenosis in the bilateral CCA Mild (20-49%) stenosis in the bilateral ICA. Mild plaque.  2. both vertebral arteries are patent with antegrade flow  ?  7/21/24 TTE: LVEF 64%; s/p MV Ring with Severe MS (MV area 0/87cm2; mean gradient 15mmHg); mild MR/TR; mod dil LA, bowing of intra-atrial septum to left; mild pulm HTN (PASP 46mmHg).  ?  8/11/24 Stress Echo: Negative stress echo for myocardial ischemia. Normal wall motion; no arrythmia; pt achieved protocol. Increased PASP and LV filling pressures at rest and post-stress. LVEF 60% at rest and 75% with stress.  ?  9/25/24 MARLEEN:  1. Normal left ventricular size and systolic function.   2. Normal right ventricular size and systolic function.   3. No LA/RA/ОЛЕГ/RAA thrombus seen.   4. No evidence of an intracardiac shunt.   5. An annuloplasty ring is noted in the mitral position. The mitral valve is moderately thickened. The P2 scallop is flail.   6. There is severe mitral stenosis. The mean transvalvular gradient is 11.00 mmHg at a heart rate of 70 bpm. The mitral valve area estimated via 3D MARLEEN planimetry is 1.10 - 1.5 cm.   7. Mild mitral regurgitation (intra-valvular).   8. No evidence of pulmonary hypertension.   9. No pericardial effusion.  10. No prior echo is available for comparison.  ?  9/25/24 Cardiac cath: LM normal, LA Dmild LI, LCx mild LI (dominant), RCA normal; LVEDP 10mmHg.

## 2024-10-16 NOTE — H&P ADULT - HISTORY OF PRESENT ILLNESS
57 yo male with PMHx HTN, HLD, DM-2, mild CAD and mitral valve prolapse s/p MV repair w/28mm Jaymie ring in 2011 @ [facility] presents with severe mitral valve stenosis. Patient presented to his cardiologist, Dr. Vazquez, with c/o intermittent substernal chest pressure, non-radiating, occurring upon moderate physical exertion, which improves w/ rest, that has been gradually worsening over the past few weeks. Pt also reported SOB/ELLIOTT with light exertion, and dizziness/lightheadedness. NYHA 3. Pt denies palpitations, diaphoresis, orthopnea/PND, cough, leg swelling, LOC, fall, syncope, N/V/D, fever/chills/sick contact, rash, hematuria, BRBPR, melena/hematochezia. 7/21/24 TTE: LVEF 64%; s/p MV Ring with Severe MS. 8/11/24 Stress echo negative for ischemia. 9/25/24 Cardiac cath revealed non-obstructive CAD. 9/25/24 MARLEEN revealed severe mitral stenosis, mild MR, normal LV function.  ?  Patient was seen in the outpatient office for surgical consult with Dr. Hernandez accompanied by his wife and son. He appears generally well, NAD. 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 57 yo male with PMHx HTN, HLD, DM-2, mild CAD and mitral valve prolapse s/p MV repair w/28mm Jaymie ring in 2011 @ [facility] presents with severe mitral valve stenosis. Patient presented to his cardiologist, Dr. Vazquez, with c/o intermittent substernal chest pressure, non-radiating, occurring upon moderate physical exertion, which improves w/ rest, that has been gradually worsening over the past few weeks. Pt also reported SOB/ELLIOTT with light exertion, and dizziness/lightheadedness. NYHA 3. Pt denies palpitations, diaphoresis, orthopnea/PND, cough, leg swelling, LOC, fall, syncope, N/V/D, fever/chills/sick contact, rash, hematuria, BRBPR, melena/hematochezia. 7/21/24 TTE: LVEF 64%; s/p MV Ring with Severe MS. 8/11/24 Stress echo negative for ischemia. 9/25/24 Cardiac cath revealed non-obstructive CAD. 9/25/24 MARLEEN revealed severe mitral stenosis, mild MR, normal LV function.  ?  Patient was seen in the outpatient office for surgical consult with Dr. Hernandez accompanied by his wife and son. He appears generally well, NAD. 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    Patient seen in same day holding area; Reports no changes to PMHx or medications since last seen by our team. Denies acute or current SOB, chest pain, palpitation, N/V/D, fever/chills, recent illness, or any other concerning symptoms. Pt reports nothing to eat or drink since last night. Pt last took his beta blocker this morning, vit c last night and gatorade this morning

## 2024-10-16 NOTE — H&P ADULT - ASSESSMENT
57 yo male with PMHx HTN, HLD, DM-2, mild CAD and mitral valve prolapse s/p MV repair w/28mm Jaymie ring presents with severe mitral valve stenosis. Dr. Hernandez reviewed the cardiac cath images and echocardiogram images with the patient and his family and discussed the case with Dr. Nancy Hernandez and Dr. Airam Hernandez. Dr. Hernandez performed the STS risk calculator and quoted a 2-3% operative mortality and complication risk and discussed the STS risk factors with the patient including but not limited to death, heart attack, bleeding, stroke, kidney problems and infection. Dr. Hernandez also discussed surgical approaches, minimally invasive versus sternotomy. Dr. Hernandez feels the patient will benefit from and is a candidate for Reop, Robotic, mitral valve replacement w/bioprosthesis. All questions were addressed, and the patient agrees to proceed with surgery.  ?  Plan:  PST--labs, xray, u/a,  Blood drawn in office and reviewed in sunrise  SDA 10/23  ascorbic acid 2gm at bedtime on 10/22  16oz Gatorade on the morning of surgery  patient instructed to take Metoprolol on morning of surgery  instructions provided re antibacterial showers and pt given 3 sponges  pt instructed on use of the incentive spirometer and demonstrated use to 1500cc.

## 2024-10-22 RX ORDER — MULTIVIT-MIN/FOLIC/VIT K/LYCOP 400-300MCG
1000 TABLET ORAL
Qty: 2 | Refills: 0 | Status: ACTIVE | COMMUNITY
Start: 2024-10-22 | End: 1900-01-01

## 2024-10-22 NOTE — PRE-OP CHECKLIST - SELECT TESTS ORDERED
cardiac cath, rodney, echo, tsh, hep. b./CBC/CMP/PT/PTT/INR/Urinalysis/CXR cardiac cath, rodney, echo, tsh, hep. b./CBC/CMP/PT/PTT/INR/Type and Screen/Urinalysis/CXR

## 2024-10-22 NOTE — PATIENT PROFILE ADULT - FALL HARM RISK - UNIVERSAL INTERVENTIONS
Bed in lowest position, wheels locked, appropriate side rails in place/Call bell, personal items and telephone in reach/Instruct patient to call for assistance before getting out of bed or chair/Non-slip footwear when patient is out of bed/[place] to call system/Physically safe environment - no spills, clutter or unnecessary equipment/Purposeful Proactive Rounding/Room/bathroom lighting operational, light cord in reach

## 2024-10-23 ENCOUNTER — INPATIENT (INPATIENT)
Facility: HOSPITAL | Age: 59
LOS: 5 days | Discharge: HOME HEALTH | End: 2024-10-29
Attending: THORACIC SURGERY (CARDIOTHORACIC VASCULAR SURGERY) | Admitting: THORACIC SURGERY (CARDIOTHORACIC VASCULAR SURGERY)
Payer: COMMERCIAL

## 2024-10-23 ENCOUNTER — TRANSCRIPTION ENCOUNTER (OUTPATIENT)
Age: 59
End: 2024-10-23

## 2024-10-23 ENCOUNTER — APPOINTMENT (OUTPATIENT)
Dept: CARDIOTHORACIC SURGERY | Facility: HOSPITAL | Age: 59
End: 2024-10-23

## 2024-10-23 DIAGNOSIS — Z98.890 OTHER SPECIFIED POSTPROCEDURAL STATES: Chronic | ICD-10-CM

## 2024-10-23 LAB
ALBUMIN SERPL ELPH-MCNC: 3.4 G/DL — SIGNIFICANT CHANGE UP (ref 3.3–5)
ALBUMIN SERPL ELPH-MCNC: 4.1 G/DL — SIGNIFICANT CHANGE UP (ref 3.3–5)
ALBUMIN SERPL ELPH-MCNC: 4.1 G/DL — SIGNIFICANT CHANGE UP (ref 3.3–5)
ALP SERPL-CCNC: 38 U/L — LOW (ref 40–120)
ALP SERPL-CCNC: 39 U/L — LOW (ref 40–120)
ALP SERPL-CCNC: 43 U/L — SIGNIFICANT CHANGE UP (ref 40–120)
ALT FLD-CCNC: 14 U/L — SIGNIFICANT CHANGE UP (ref 10–45)
ALT FLD-CCNC: 14 U/L — SIGNIFICANT CHANGE UP (ref 10–45)
ALT FLD-CCNC: 19 U/L — SIGNIFICANT CHANGE UP (ref 10–45)
ANION GAP SERPL CALC-SCNC: 13 MMOL/L — SIGNIFICANT CHANGE UP (ref 5–17)
ANION GAP SERPL CALC-SCNC: 15 MMOL/L — SIGNIFICANT CHANGE UP (ref 5–17)
ANION GAP SERPL CALC-SCNC: 9 MMOL/L — SIGNIFICANT CHANGE UP (ref 5–17)
APTT BLD: 27.6 SEC — SIGNIFICANT CHANGE UP (ref 24.5–35.6)
APTT BLD: 30.5 SEC — SIGNIFICANT CHANGE UP (ref 24.5–35.6)
APTT BLD: 42.6 SEC — HIGH (ref 24.5–35.6)
AST SERPL-CCNC: 61 U/L — HIGH (ref 10–40)
AST SERPL-CCNC: 63 U/L — HIGH (ref 10–40)
AST SERPL-CCNC: 76 U/L — HIGH (ref 10–40)
BASE EXCESS BLDA CALC-SCNC: -1.8 MMOL/L — SIGNIFICANT CHANGE UP (ref -2–3)
BASE EXCESS BLDA CALC-SCNC: 0 MMOL/L — SIGNIFICANT CHANGE UP (ref -2–3)
BASE EXCESS BLDA CALC-SCNC: 0.2 MMOL/L — SIGNIFICANT CHANGE UP (ref -2–3)
BASE EXCESS BLDA CALC-SCNC: 0.5 MMOL/L — SIGNIFICANT CHANGE UP (ref -2–3)
BASE EXCESS BLDA CALC-SCNC: 0.5 MMOL/L — SIGNIFICANT CHANGE UP (ref -2–3)
BASE EXCESS BLDA CALC-SCNC: 0.6 MMOL/L — SIGNIFICANT CHANGE UP (ref -2–3)
BASE EXCESS BLDA CALC-SCNC: 0.6 MMOL/L — SIGNIFICANT CHANGE UP (ref -2–3)
BASE EXCESS BLDA CALC-SCNC: 0.9 MMOL/L — SIGNIFICANT CHANGE UP (ref -2–3)
BASOPHILS # BLD AUTO: 0.01 K/UL — SIGNIFICANT CHANGE UP (ref 0–0.2)
BASOPHILS # BLD AUTO: 0.01 K/UL — SIGNIFICANT CHANGE UP (ref 0–0.2)
BASOPHILS # BLD AUTO: 0.03 K/UL — SIGNIFICANT CHANGE UP (ref 0–0.2)
BASOPHILS NFR BLD AUTO: 0.1 % — SIGNIFICANT CHANGE UP (ref 0–2)
BASOPHILS NFR BLD AUTO: 0.1 % — SIGNIFICANT CHANGE UP (ref 0–2)
BASOPHILS NFR BLD AUTO: 0.2 % — SIGNIFICANT CHANGE UP (ref 0–2)
BILIRUB SERPL-MCNC: 0.4 MG/DL — SIGNIFICANT CHANGE UP (ref 0.2–1.2)
BILIRUB SERPL-MCNC: 0.7 MG/DL — SIGNIFICANT CHANGE UP (ref 0.2–1.2)
BILIRUB SERPL-MCNC: 0.7 MG/DL — SIGNIFICANT CHANGE UP (ref 0.2–1.2)
BLD GP AB SCN SERPL QL: NEGATIVE — SIGNIFICANT CHANGE UP
BUN SERPL-MCNC: 12 MG/DL — SIGNIFICANT CHANGE UP (ref 7–23)
BUN SERPL-MCNC: 14 MG/DL — SIGNIFICANT CHANGE UP (ref 7–23)
BUN SERPL-MCNC: 15 MG/DL — SIGNIFICANT CHANGE UP (ref 7–23)
CA-I BLDA-SCNC: 0.92 MMOL/L — LOW (ref 1.15–1.33)
CA-I BLDA-SCNC: 1.05 MMOL/L — LOW (ref 1.15–1.33)
CA-I BLDA-SCNC: 1.07 MMOL/L — LOW (ref 1.15–1.33)
CA-I BLDA-SCNC: 1.08 MMOL/L — LOW (ref 1.15–1.33)
CA-I BLDA-SCNC: 1.16 MMOL/L — SIGNIFICANT CHANGE UP (ref 1.15–1.33)
CA-I BLDA-SCNC: 1.22 MMOL/L — SIGNIFICANT CHANGE UP (ref 1.15–1.33)
CA-I BLDA-SCNC: 1.22 MMOL/L — SIGNIFICANT CHANGE UP (ref 1.15–1.33)
CA-I BLDA-SCNC: 1.29 MMOL/L — SIGNIFICANT CHANGE UP (ref 1.15–1.33)
CALCIUM SERPL-MCNC: 8.2 MG/DL — LOW (ref 8.4–10.5)
CALCIUM SERPL-MCNC: 8.9 MG/DL — SIGNIFICANT CHANGE UP (ref 8.4–10.5)
CALCIUM SERPL-MCNC: 9 MG/DL — SIGNIFICANT CHANGE UP (ref 8.4–10.5)
CHLORIDE SERPL-SCNC: 102 MMOL/L — SIGNIFICANT CHANGE UP (ref 96–108)
CHLORIDE SERPL-SCNC: 103 MMOL/L — SIGNIFICANT CHANGE UP (ref 96–108)
CHLORIDE SERPL-SCNC: 106 MMOL/L — SIGNIFICANT CHANGE UP (ref 96–108)
CO2 BLDA-SCNC: 24 MMOL/L — SIGNIFICANT CHANGE UP (ref 19–24)
CO2 BLDA-SCNC: 25 MMOL/L — HIGH (ref 19–24)
CO2 BLDA-SCNC: 26 MMOL/L — HIGH (ref 19–24)
CO2 BLDA-SCNC: 28 MMOL/L — HIGH (ref 19–24)
CO2 BLDA-SCNC: 29 MMOL/L — HIGH (ref 19–24)
CO2 BLDA-SCNC: 29 MMOL/L — HIGH (ref 19–24)
CO2 SERPL-SCNC: 20 MMOL/L — LOW (ref 22–31)
CO2 SERPL-SCNC: 24 MMOL/L — SIGNIFICANT CHANGE UP (ref 22–31)
CO2 SERPL-SCNC: 24 MMOL/L — SIGNIFICANT CHANGE UP (ref 22–31)
COHGB MFR BLDA: 0 % — SIGNIFICANT CHANGE UP
COHGB MFR BLDA: 0.4 % — SIGNIFICANT CHANGE UP
COHGB MFR BLDA: 0.5 % — SIGNIFICANT CHANGE UP
COHGB MFR BLDA: 0.7 % — SIGNIFICANT CHANGE UP
COHGB MFR BLDA: 0.9 % — SIGNIFICANT CHANGE UP
COHGB MFR BLDA: 0.9 % — SIGNIFICANT CHANGE UP
CREAT SERPL-MCNC: 0.8 MG/DL — SIGNIFICANT CHANGE UP (ref 0.5–1.3)
CREAT SERPL-MCNC: 0.81 MG/DL — SIGNIFICANT CHANGE UP (ref 0.5–1.3)
CREAT SERPL-MCNC: 0.84 MG/DL — SIGNIFICANT CHANGE UP (ref 0.5–1.3)
EGFR: 102 ML/MIN/1.73M2 — SIGNIFICANT CHANGE UP
EGFR: 103 ML/MIN/1.73M2 — SIGNIFICANT CHANGE UP
EGFR: 103 ML/MIN/1.73M2 — SIGNIFICANT CHANGE UP
EOSINOPHIL # BLD AUTO: 0.01 K/UL — SIGNIFICANT CHANGE UP (ref 0–0.5)
EOSINOPHIL # BLD AUTO: 0.01 K/UL — SIGNIFICANT CHANGE UP (ref 0–0.5)
EOSINOPHIL # BLD AUTO: 0.07 K/UL — SIGNIFICANT CHANGE UP (ref 0–0.5)
EOSINOPHIL NFR BLD AUTO: 0.1 % — SIGNIFICANT CHANGE UP (ref 0–6)
EOSINOPHIL NFR BLD AUTO: 0.1 % — SIGNIFICANT CHANGE UP (ref 0–6)
EOSINOPHIL NFR BLD AUTO: 0.6 % — SIGNIFICANT CHANGE UP (ref 0–6)
GAS PNL BLDA: SIGNIFICANT CHANGE UP
GLUCOSE BLDA-MCNC: 109 MG/DL — HIGH (ref 70–99)
GLUCOSE BLDA-MCNC: 131 MG/DL — HIGH (ref 70–99)
GLUCOSE BLDA-MCNC: 146 MG/DL — HIGH (ref 70–99)
GLUCOSE BLDA-MCNC: 178 MG/DL — HIGH (ref 70–99)
GLUCOSE BLDA-MCNC: 180 MG/DL — HIGH (ref 70–99)
GLUCOSE BLDA-MCNC: 196 MG/DL — HIGH (ref 70–99)
GLUCOSE BLDA-MCNC: 216 MG/DL — HIGH (ref 70–99)
GLUCOSE BLDA-MCNC: 236 MG/DL — HIGH (ref 70–99)
GLUCOSE BLDC GLUCOMTR-MCNC: 105 MG/DL — HIGH (ref 70–99)
GLUCOSE BLDC GLUCOMTR-MCNC: 147 MG/DL — HIGH (ref 70–99)
GLUCOSE BLDC GLUCOMTR-MCNC: 148 MG/DL — HIGH (ref 70–99)
GLUCOSE SERPL-MCNC: 167 MG/DL — HIGH (ref 70–99)
GLUCOSE SERPL-MCNC: 170 MG/DL — HIGH (ref 70–99)
GLUCOSE SERPL-MCNC: 171 MG/DL — HIGH (ref 70–99)
HCO3 BLDA-SCNC: 23 MMOL/L — SIGNIFICANT CHANGE UP (ref 21–28)
HCO3 BLDA-SCNC: 24 MMOL/L — SIGNIFICANT CHANGE UP (ref 21–28)
HCO3 BLDA-SCNC: 24 MMOL/L — SIGNIFICANT CHANGE UP (ref 21–28)
HCO3 BLDA-SCNC: 25 MMOL/L — SIGNIFICANT CHANGE UP (ref 21–28)
HCO3 BLDA-SCNC: 25 MMOL/L — SIGNIFICANT CHANGE UP (ref 21–28)
HCO3 BLDA-SCNC: 26 MMOL/L — SIGNIFICANT CHANGE UP (ref 21–28)
HCO3 BLDA-SCNC: 27 MMOL/L — SIGNIFICANT CHANGE UP (ref 21–28)
HCO3 BLDA-SCNC: 28 MMOL/L — SIGNIFICANT CHANGE UP (ref 21–28)
HCT VFR BLD CALC: 26.4 % — LOW (ref 39–50)
HCT VFR BLD CALC: 31.2 % — LOW (ref 39–50)
HCT VFR BLD CALC: 31.4 % — LOW (ref 39–50)
HGB BLD-MCNC: 10.2 G/DL — LOW (ref 13–17)
HGB BLD-MCNC: 10.5 G/DL — LOW (ref 13–17)
HGB BLD-MCNC: 8.9 G/DL — LOW (ref 13–17)
HGB BLDA-MCNC: 10.4 G/DL — LOW (ref 12.6–17.4)
HGB BLDA-MCNC: 11.8 G/DL — LOW (ref 12.6–17.4)
HGB BLDA-MCNC: 11.9 G/DL — LOW (ref 12.6–17.4)
HGB BLDA-MCNC: 9.2 G/DL — LOW (ref 12.6–17.4)
HGB BLDA-MCNC: 9.3 G/DL — LOW (ref 12.6–17.4)
HGB BLDA-MCNC: 9.6 G/DL — LOW (ref 12.6–17.4)
HGB BLDA-MCNC: 9.7 G/DL — LOW (ref 12.6–17.4)
HGB BLDA-MCNC: 9.9 G/DL — LOW (ref 12.6–17.4)
IMM GRANULOCYTES NFR BLD AUTO: 0.4 % — SIGNIFICANT CHANGE UP (ref 0–0.9)
IMM GRANULOCYTES NFR BLD AUTO: 0.5 % — SIGNIFICANT CHANGE UP (ref 0–0.9)
IMM GRANULOCYTES NFR BLD AUTO: 0.6 % — SIGNIFICANT CHANGE UP (ref 0–0.9)
INR BLD: 1.07 — SIGNIFICANT CHANGE UP (ref 0.85–1.16)
INR BLD: 1.15 — SIGNIFICANT CHANGE UP (ref 0.85–1.16)
INR BLD: 1.19 — HIGH (ref 0.85–1.16)
ISTAT ARTERIAL BE: -2 MMOL/L — SIGNIFICANT CHANGE UP (ref -2–3)
ISTAT ARTERIAL GLUCOSE: 143 MG/DL — HIGH (ref 70–99)
ISTAT ARTERIAL HCO3: 24 MMOL/L — SIGNIFICANT CHANGE UP (ref 22–26)
ISTAT ARTERIAL HEMATOCRIT: 26 % — LOW (ref 39–50)
ISTAT ARTERIAL HEMOGLOBIN: 8.8 G/DL — LOW (ref 13–17)
ISTAT ARTERIAL IONIZED CALCIUM: 1.13 MMOL/L — SIGNIFICANT CHANGE UP (ref 1.12–1.3)
ISTAT ARTERIAL PCO2: 46 MMHG — HIGH (ref 35–45)
ISTAT ARTERIAL PH: 7.32 — LOW (ref 7.35–7.45)
ISTAT ARTERIAL PO2: 345 MMHG — HIGH (ref 80–105)
ISTAT ARTERIAL POTASSIUM: 3.4 MMOL/L — LOW (ref 3.5–5.3)
ISTAT ARTERIAL SO2: 100 % — HIGH (ref 95–98)
ISTAT ARTERIAL SODIUM: 143 MMOL/L — SIGNIFICANT CHANGE UP (ref 135–145)
ISTAT ARTERIAL TCO2: 25 MMOL/L — SIGNIFICANT CHANGE UP (ref 22–31)
LACTATE SERPL-SCNC: 1.1 MMOL/L — SIGNIFICANT CHANGE UP (ref 0.5–2)
LACTATE SERPL-SCNC: 1.5 MMOL/L — SIGNIFICANT CHANGE UP (ref 0.5–2)
LYMPHOCYTES # BLD AUTO: 0.64 K/UL — LOW (ref 1–3.3)
LYMPHOCYTES # BLD AUTO: 0.82 K/UL — LOW (ref 1–3.3)
LYMPHOCYTES # BLD AUTO: 1.57 K/UL — SIGNIFICANT CHANGE UP (ref 1–3.3)
LYMPHOCYTES # BLD AUTO: 12.8 % — LOW (ref 13–44)
LYMPHOCYTES # BLD AUTO: 6.8 % — LOW (ref 13–44)
LYMPHOCYTES # BLD AUTO: 7.2 % — LOW (ref 13–44)
MAGNESIUM SERPL-MCNC: 1.9 MG/DL — SIGNIFICANT CHANGE UP (ref 1.6–2.6)
MAGNESIUM SERPL-MCNC: 2 MG/DL — SIGNIFICANT CHANGE UP (ref 1.6–2.6)
MAGNESIUM SERPL-MCNC: 2.4 MG/DL — SIGNIFICANT CHANGE UP (ref 1.6–2.6)
MCHC RBC-ENTMCNC: 31.3 PG — SIGNIFICANT CHANGE UP (ref 27–34)
MCHC RBC-ENTMCNC: 32.2 PG — SIGNIFICANT CHANGE UP (ref 27–34)
MCHC RBC-ENTMCNC: 32.3 PG — SIGNIFICANT CHANGE UP (ref 27–34)
MCHC RBC-ENTMCNC: 32.7 GM/DL — SIGNIFICANT CHANGE UP (ref 32–36)
MCHC RBC-ENTMCNC: 33.4 GM/DL — SIGNIFICANT CHANGE UP (ref 32–36)
MCHC RBC-ENTMCNC: 33.7 GM/DL — SIGNIFICANT CHANGE UP (ref 32–36)
MCV RBC AUTO: 95.7 FL — SIGNIFICANT CHANGE UP (ref 80–100)
MCV RBC AUTO: 95.7 FL — SIGNIFICANT CHANGE UP (ref 80–100)
MCV RBC AUTO: 96.6 FL — SIGNIFICANT CHANGE UP (ref 80–100)
METHGB MFR BLDA: 0.8 % — SIGNIFICANT CHANGE UP
METHGB MFR BLDA: 0.9 % — SIGNIFICANT CHANGE UP
METHGB MFR BLDA: 1 % — SIGNIFICANT CHANGE UP
METHGB MFR BLDA: 1.3 % — SIGNIFICANT CHANGE UP
METHGB MFR BLDA: 1.4 % — SIGNIFICANT CHANGE UP
METHGB MFR BLDA: 1.7 % — HIGH
MONOCYTES # BLD AUTO: 0.69 K/UL — SIGNIFICANT CHANGE UP (ref 0–0.9)
MONOCYTES # BLD AUTO: 0.76 K/UL — SIGNIFICANT CHANGE UP (ref 0–0.9)
MONOCYTES # BLD AUTO: 1.16 K/UL — HIGH (ref 0–0.9)
MONOCYTES NFR BLD AUTO: 10.2 % — SIGNIFICANT CHANGE UP (ref 2–14)
MONOCYTES NFR BLD AUTO: 6.2 % — SIGNIFICANT CHANGE UP (ref 2–14)
MONOCYTES NFR BLD AUTO: 7.4 % — SIGNIFICANT CHANGE UP (ref 2–14)
NEUTROPHILS # BLD AUTO: 7.98 K/UL — HIGH (ref 1.8–7.4)
NEUTROPHILS # BLD AUTO: 9.33 K/UL — HIGH (ref 1.8–7.4)
NEUTROPHILS # BLD AUTO: 9.79 K/UL — HIGH (ref 1.8–7.4)
NEUTROPHILS NFR BLD AUTO: 79.6 % — HIGH (ref 43–77)
NEUTROPHILS NFR BLD AUTO: 82 % — HIGH (ref 43–77)
NEUTROPHILS NFR BLD AUTO: 85.1 % — HIGH (ref 43–77)
NRBC # BLD: 0 /100 WBCS — SIGNIFICANT CHANGE UP (ref 0–0)
OXYHGB MFR BLDA: 97.1 % — HIGH (ref 90–95)
OXYHGB MFR BLDA: 97.3 % — HIGH (ref 90–95)
OXYHGB MFR BLDA: 97.4 % — HIGH (ref 90–95)
OXYHGB MFR BLDA: 97.6 % — HIGH (ref 90–95)
OXYHGB MFR BLDA: 97.8 % — HIGH (ref 90–95)
OXYHGB MFR BLDA: 97.8 % — HIGH (ref 90–95)
OXYHGB MFR BLDA: 97.9 % — HIGH (ref 90–95)
OXYHGB MFR BLDA: 98.2 % — HIGH (ref 90–95)
PCO2 BLDA: 34 MMHG — LOW (ref 35–48)
PCO2 BLDA: 36 MMHG — SIGNIFICANT CHANGE UP (ref 35–48)
PCO2 BLDA: 38 MMHG — SIGNIFICANT CHANGE UP (ref 35–48)
PCO2 BLDA: 39 MMHG — SIGNIFICANT CHANGE UP (ref 35–48)
PCO2 BLDA: 40 MMHG — SIGNIFICANT CHANGE UP (ref 35–48)
PCO2 BLDA: 48 MMHG — SIGNIFICANT CHANGE UP (ref 35–48)
PCO2 BLDA: 56 MMHG — HIGH (ref 35–48)
PCO2 BLDA: 57 MMHG — HIGH (ref 35–48)
PH BLDA: 7.29 — LOW (ref 7.35–7.45)
PH BLDA: 7.3 — LOW (ref 7.35–7.45)
PH BLDA: 7.35 — SIGNIFICANT CHANGE UP (ref 7.35–7.45)
PH BLDA: 7.38 — SIGNIFICANT CHANGE UP (ref 7.35–7.45)
PH BLDA: 7.4 — SIGNIFICANT CHANGE UP (ref 7.35–7.45)
PH BLDA: 7.43 — SIGNIFICANT CHANGE UP (ref 7.35–7.45)
PH BLDA: 7.44 — SIGNIFICANT CHANGE UP (ref 7.35–7.45)
PH BLDA: 7.46 — HIGH (ref 7.35–7.45)
PHOSPHATE SERPL-MCNC: 3.7 MG/DL — SIGNIFICANT CHANGE UP (ref 2.5–4.5)
PHOSPHATE SERPL-MCNC: 4 MG/DL — SIGNIFICANT CHANGE UP (ref 2.5–4.5)
PHOSPHATE SERPL-MCNC: 4.2 MG/DL — SIGNIFICANT CHANGE UP (ref 2.5–4.5)
PLATELET # BLD AUTO: 119 K/UL — LOW (ref 150–400)
PLATELET # BLD AUTO: 132 K/UL — LOW (ref 150–400)
PLATELET # BLD AUTO: 139 K/UL — LOW (ref 150–400)
PO2 BLDA: 188 MMHG — HIGH (ref 83–108)
PO2 BLDA: 276 MMHG — HIGH (ref 83–108)
PO2 BLDA: 392 MMHG — HIGH (ref 83–108)
PO2 BLDA: 419 MMHG — HIGH (ref 83–108)
PO2 BLDA: 438 MMHG — HIGH (ref 83–108)
PO2 BLDA: 465 MMHG — HIGH (ref 83–108)
PO2 BLDA: 472 MMHG — HIGH (ref 83–108)
PO2 BLDA: 606 MMHG — HIGH (ref 83–108)
POTASSIUM BLDA-SCNC: 3.6 MMOL/L — SIGNIFICANT CHANGE UP (ref 3.5–5.1)
POTASSIUM BLDA-SCNC: 3.6 MMOL/L — SIGNIFICANT CHANGE UP (ref 3.5–5.1)
POTASSIUM BLDA-SCNC: 3.8 MMOL/L — SIGNIFICANT CHANGE UP (ref 3.5–5.1)
POTASSIUM BLDA-SCNC: 3.9 MMOL/L — SIGNIFICANT CHANGE UP (ref 3.5–5.1)
POTASSIUM BLDA-SCNC: 4 MMOL/L — SIGNIFICANT CHANGE UP (ref 3.5–5.1)
POTASSIUM BLDA-SCNC: 4.1 MMOL/L — SIGNIFICANT CHANGE UP (ref 3.5–5.1)
POTASSIUM BLDA-SCNC: 4.2 MMOL/L — SIGNIFICANT CHANGE UP (ref 3.5–5.1)
POTASSIUM BLDA-SCNC: 4.2 MMOL/L — SIGNIFICANT CHANGE UP (ref 3.5–5.1)
POTASSIUM SERPL-MCNC: 3.6 MMOL/L — SIGNIFICANT CHANGE UP (ref 3.5–5.3)
POTASSIUM SERPL-MCNC: 4.1 MMOL/L — SIGNIFICANT CHANGE UP (ref 3.5–5.3)
POTASSIUM SERPL-MCNC: 4.4 MMOL/L — SIGNIFICANT CHANGE UP (ref 3.5–5.3)
POTASSIUM SERPL-SCNC: 3.6 MMOL/L — SIGNIFICANT CHANGE UP (ref 3.5–5.3)
POTASSIUM SERPL-SCNC: 4.1 MMOL/L — SIGNIFICANT CHANGE UP (ref 3.5–5.3)
POTASSIUM SERPL-SCNC: 4.4 MMOL/L — SIGNIFICANT CHANGE UP (ref 3.5–5.3)
PROT SERPL-MCNC: 6.1 G/DL — SIGNIFICANT CHANGE UP (ref 6–8.3)
PROT SERPL-MCNC: 6.2 G/DL — SIGNIFICANT CHANGE UP (ref 6–8.3)
PROT SERPL-MCNC: 6.4 G/DL — SIGNIFICANT CHANGE UP (ref 6–8.3)
PROTHROM AB SERPL-ACNC: 12.5 SEC — SIGNIFICANT CHANGE UP (ref 9.9–13.4)
PROTHROM AB SERPL-ACNC: 13.4 SEC — SIGNIFICANT CHANGE UP (ref 9.9–13.4)
PROTHROM AB SERPL-ACNC: 13.7 SEC — HIGH (ref 9.9–13.4)
RBC # BLD: 2.76 M/UL — LOW (ref 4.2–5.8)
RBC # BLD: 3.25 M/UL — LOW (ref 4.2–5.8)
RBC # BLD: 3.26 M/UL — LOW (ref 4.2–5.8)
RBC # FLD: 12.2 % — SIGNIFICANT CHANGE UP (ref 10.3–14.5)
RBC # FLD: 12.3 % — SIGNIFICANT CHANGE UP (ref 10.3–14.5)
RBC # FLD: 13.5 % — SIGNIFICANT CHANGE UP (ref 10.3–14.5)
RH IG SCN BLD-IMP: POSITIVE — SIGNIFICANT CHANGE UP
SAO2 % BLDA: 98.4 % — HIGH (ref 94–98)
SAO2 % BLDA: 98.9 % — HIGH (ref 94–98)
SAO2 % BLDA: 99.4 % — HIGH (ref 94–98)
SAO2 % BLDA: 99.5 % — HIGH (ref 94–98)
SAO2 % BLDA: 99.7 % — HIGH (ref 94–98)
SAO2 % BLDA: 99.8 % — HIGH (ref 94–98)
SODIUM BLDA-SCNC: 136 MMOL/L — SIGNIFICANT CHANGE UP (ref 136–145)
SODIUM BLDA-SCNC: 137 MMOL/L — SIGNIFICANT CHANGE UP (ref 136–145)
SODIUM BLDA-SCNC: 138 MMOL/L — SIGNIFICANT CHANGE UP (ref 136–145)
SODIUM SERPL-SCNC: 137 MMOL/L — SIGNIFICANT CHANGE UP (ref 135–145)
SODIUM SERPL-SCNC: 139 MMOL/L — SIGNIFICANT CHANGE UP (ref 135–145)
SODIUM SERPL-SCNC: 140 MMOL/L — SIGNIFICANT CHANGE UP (ref 135–145)
WBC # BLD: 11.37 K/UL — HIGH (ref 3.8–10.5)
WBC # BLD: 12.29 K/UL — HIGH (ref 3.8–10.5)
WBC # BLD: 9.38 K/UL — SIGNIFICANT CHANGE UP (ref 3.8–10.5)
WBC # FLD AUTO: 11.37 K/UL — HIGH (ref 3.8–10.5)
WBC # FLD AUTO: 12.29 K/UL — HIGH (ref 3.8–10.5)
WBC # FLD AUTO: 9.38 K/UL — SIGNIFICANT CHANGE UP (ref 3.8–10.5)

## 2024-10-23 PROCEDURE — 88305 TISSUE EXAM BY PATHOLOGIST: CPT | Mod: 26

## 2024-10-23 PROCEDURE — 33430 REPLACEMENT OF MITRAL VALVE: CPT

## 2024-10-23 PROCEDURE — 71045 X-RAY EXAM CHEST 1 VIEW: CPT | Mod: 26

## 2024-10-23 PROCEDURE — 33530 CORONARY ARTERY BYPASS/REOP: CPT

## 2024-10-23 PROCEDURE — 99292 CRITICAL CARE ADDL 30 MIN: CPT

## 2024-10-23 PROCEDURE — 99291 CRITICAL CARE FIRST HOUR: CPT

## 2024-10-23 DEVICE — CANNULA AORTIC ROOT MIAR 14G FLANGED: Type: IMPLANTABLE DEVICE | Status: FUNCTIONAL

## 2024-10-23 DEVICE — COR-KNOT QUICK LOAD SINGLES: Type: IMPLANTABLE DEVICE | Status: FUNCTIONAL

## 2024-10-23 DEVICE — KIT CVC 3LUM SPECTRUM 9FR: Type: IMPLANTABLE DEVICE | Status: FUNCTIONAL

## 2024-10-23 DEVICE — CHEST DRAIN THORACIC PVC 28FR RIGHT ANGLE: Type: IMPLANTABLE DEVICE | Status: FUNCTIONAL

## 2024-10-23 DEVICE — KIT CVC 2LUM MAC 9FR CHG: Type: IMPLANTABLE DEVICE | Status: FUNCTIONAL

## 2024-10-23 DEVICE — KIT PACE ELCTR BIPOLAR 5FR: Type: IMPLANTABLE DEVICE | Status: FUNCTIONAL

## 2024-10-23 DEVICE — COR-KNOT MIS DEVICE KIT: Type: IMPLANTABLE DEVICE | Status: FUNCTIONAL

## 2024-10-23 DEVICE — IMPLANTABLE DEVICE: Type: IMPLANTABLE DEVICE | Status: FUNCTIONAL

## 2024-10-23 DEVICE — CANNULA FEM VENOUS RAP 23-25FR: Type: IMPLANTABLE DEVICE | Status: FUNCTIONAL

## 2024-10-23 DEVICE — CANNULA ARTERIAL OPTISITE 18FR X 3/8" VENTED: Type: IMPLANTABLE DEVICE | Status: FUNCTIONAL

## 2024-10-23 DEVICE — INTRODUCER PERCUTANEOUS INSERTION KIT: Type: IMPLANTABLE DEVICE | Status: FUNCTIONAL

## 2024-10-23 RX ORDER — INSULIN REG, HUM S-S BUFF 100/ML
1 VIAL (ML) INJECTION
Qty: 100 | Refills: 0 | Status: DISCONTINUED | OUTPATIENT
Start: 2024-10-23 | End: 2024-10-23

## 2024-10-23 RX ORDER — ALBUMIN HUMAN 50 G/1000ML
250 SOLUTION INTRAVENOUS
Refills: 0 | Status: COMPLETED | OUTPATIENT
Start: 2024-10-23 | End: 2024-10-23

## 2024-10-23 RX ORDER — POLYETHYLENE GLYCOL 3350 17 G/17G
17 POWDER, FOR SOLUTION ORAL DAILY
Refills: 0 | Status: DISCONTINUED | OUTPATIENT
Start: 2024-10-23 | End: 2024-10-26

## 2024-10-23 RX ORDER — CHLORHEXIDINE GLUCONATE 40 MG/ML
1 SOLUTION TOPICAL DAILY
Refills: 0 | Status: DISCONTINUED | OUTPATIENT
Start: 2024-10-23 | End: 2024-10-29

## 2024-10-23 RX ORDER — GLUCAGON INJECTION, SOLUTION 1 MG/.2ML
1 INJECTION, SOLUTION SUBCUTANEOUS ONCE
Refills: 0 | Status: DISCONTINUED | OUTPATIENT
Start: 2024-10-23 | End: 2024-10-29

## 2024-10-23 RX ORDER — CALCIUM GLUCONATE 98 MG/ML
2 INJECTION, SOLUTION INTRAVENOUS ONCE
Refills: 0 | Status: COMPLETED | OUTPATIENT
Start: 2024-10-23 | End: 2024-10-23

## 2024-10-23 RX ORDER — HYDRALAZINE HYDROCHLORIDE 50 MG/1
10 TABLET, FILM COATED ORAL ONCE
Refills: 0 | Status: COMPLETED | OUTPATIENT
Start: 2024-10-23 | End: 2024-10-23

## 2024-10-23 RX ORDER — CHLORHEXIDINE GLUCONATE 40 MG/ML
15 SOLUTION TOPICAL EVERY 12 HOURS
Refills: 0 | Status: DISCONTINUED | OUTPATIENT
Start: 2024-10-23 | End: 2024-10-23

## 2024-10-23 RX ORDER — ACETAMINOPHEN 500 MG
1000 TABLET ORAL ONCE
Refills: 0 | Status: COMPLETED | OUTPATIENT
Start: 2024-10-23 | End: 2024-10-23

## 2024-10-23 RX ORDER — SENNA 187 MG
2 TABLET ORAL AT BEDTIME
Refills: 0 | Status: DISCONTINUED | OUTPATIENT
Start: 2024-10-24 | End: 2024-10-29

## 2024-10-23 RX ORDER — ACETAMINOPHEN 500 MG
1000 TABLET ORAL EVERY 6 HOURS
Refills: 0 | Status: COMPLETED | OUTPATIENT
Start: 2024-10-23 | End: 2024-10-24

## 2024-10-23 RX ORDER — NOREPINEPHRINE BITARTRATE 1 MG/ML
0.05 INJECTION, SOLUTION, CONCENTRATE INTRAVENOUS
Qty: 8 | Refills: 0 | Status: DISCONTINUED | OUTPATIENT
Start: 2024-10-23 | End: 2024-10-25

## 2024-10-23 RX ORDER — MIDODRINE HYDROCHLORIDE 2.5 MG/1
10 TABLET ORAL EVERY 8 HOURS
Refills: 0 | Status: DISCONTINUED | OUTPATIENT
Start: 2024-10-23 | End: 2024-10-27

## 2024-10-23 RX ORDER — MUPIROCIN 20 MG/G
1 OINTMENT TOPICAL ONCE
Refills: 0 | Status: COMPLETED | OUTPATIENT
Start: 2024-10-23 | End: 2024-10-23

## 2024-10-23 RX ORDER — ALBUMIN HUMAN 50 G/1000ML
250 SOLUTION INTRAVENOUS ONCE
Refills: 0 | Status: COMPLETED | OUTPATIENT
Start: 2024-10-23 | End: 2024-10-23

## 2024-10-23 RX ORDER — POLYETHYLENE GLYCOL 3350 17 G/17G
17 POWDER, FOR SOLUTION ORAL DAILY
Refills: 0 | Status: DISCONTINUED | OUTPATIENT
Start: 2024-10-24 | End: 2024-10-29

## 2024-10-23 RX ORDER — FUROSEMIDE 40 MG
10 TABLET ORAL ONCE
Refills: 0 | Status: COMPLETED | OUTPATIENT
Start: 2024-10-23 | End: 2024-10-23

## 2024-10-23 RX ORDER — ASCORBIC ACID 500 MG
500 TABLET ORAL
Refills: 0 | Status: COMPLETED | OUTPATIENT
Start: 2024-10-23 | End: 2024-10-28

## 2024-10-23 RX ORDER — ASPIRIN/MAG CARB/ALUMINUM AMIN 325 MG
81 TABLET ORAL DAILY
Refills: 0 | Status: DISCONTINUED | OUTPATIENT
Start: 2024-10-23 | End: 2024-10-29

## 2024-10-23 RX ORDER — MUPIROCIN 20 MG/G
1 OINTMENT TOPICAL
Refills: 0 | Status: COMPLETED | OUTPATIENT
Start: 2024-10-23 | End: 2024-10-28

## 2024-10-23 RX ORDER — CEFAZOLIN SODIUM 1 G
2000 VIAL (EA) INJECTION EVERY 8 HOURS
Refills: 0 | Status: COMPLETED | OUTPATIENT
Start: 2024-10-23 | End: 2024-10-25

## 2024-10-23 RX ORDER — POTASSIUM CHLORIDE 10 MEQ
20 TABLET, EXTENDED RELEASE ORAL
Refills: 0 | Status: COMPLETED | OUTPATIENT
Start: 2024-10-23 | End: 2024-10-23

## 2024-10-23 RX ORDER — GABAPENTIN 300 MG/1
100 CAPSULE ORAL EVERY 8 HOURS
Refills: 0 | Status: COMPLETED | OUTPATIENT
Start: 2024-10-23 | End: 2024-10-28

## 2024-10-23 RX ORDER — SODIUM CHLORIDE 9 MG/ML
1000 INJECTION, SOLUTION INTRAMUSCULAR; INTRAVENOUS; SUBCUTANEOUS
Refills: 0 | Status: DISCONTINUED | OUTPATIENT
Start: 2024-10-23 | End: 2024-10-25

## 2024-10-23 RX ORDER — TAMSULOSIN HCL 0.4 MG
0.4 CAPSULE ORAL AT BEDTIME
Refills: 0 | Status: DISCONTINUED | OUTPATIENT
Start: 2024-10-23 | End: 2024-10-24

## 2024-10-23 RX ORDER — HEPARIN SODIUM 10000 [USP'U]/ML
5000 INJECTION INTRAVENOUS; SUBCUTANEOUS EVERY 8 HOURS
Refills: 0 | Status: DISCONTINUED | OUTPATIENT
Start: 2024-10-23 | End: 2024-10-29

## 2024-10-23 RX ORDER — MAGNESIUM SULFATE IN 0.9% NACL 2 G/50 ML
2 INTRAVENOUS SOLUTION, PIGGYBACK (ML) INTRAVENOUS ONCE
Refills: 0 | Status: COMPLETED | OUTPATIENT
Start: 2024-10-23 | End: 2024-10-23

## 2024-10-23 RX ORDER — PANTOPRAZOLE SODIUM 40 MG/1
40 TABLET, DELAYED RELEASE ORAL DAILY
Refills: 0 | Status: DISCONTINUED | OUTPATIENT
Start: 2024-10-23 | End: 2024-10-29

## 2024-10-23 RX ORDER — INSULIN LISPRO 100/ML
VIAL (ML) SUBCUTANEOUS
Refills: 0 | Status: DISCONTINUED | OUTPATIENT
Start: 2024-10-23 | End: 2024-10-27

## 2024-10-23 RX ADMIN — MUPIROCIN 1 APPLICATION(S): 20 OINTMENT TOPICAL at 06:16

## 2024-10-23 RX ADMIN — MIDODRINE HYDROCHLORIDE 10 MILLIGRAM(S): 2.5 TABLET ORAL at 21:32

## 2024-10-23 RX ADMIN — Medication 400 MILLIGRAM(S): at 14:50

## 2024-10-23 RX ADMIN — Medication 50 MILLIEQUIVALENT(S): at 17:50

## 2024-10-23 RX ADMIN — ALBUMIN HUMAN 500 MILLILITER(S): 50 SOLUTION INTRAVENOUS at 15:15

## 2024-10-23 RX ADMIN — MUPIROCIN 1 APPLICATION(S): 20 OINTMENT TOPICAL at 17:22

## 2024-10-23 RX ADMIN — SODIUM CHLORIDE 10 MILLILITER(S): 9 INJECTION, SOLUTION INTRAMUSCULAR; INTRAVENOUS; SUBCUTANEOUS at 15:00

## 2024-10-23 RX ADMIN — Medication 25 GRAM(S): at 13:10

## 2024-10-23 RX ADMIN — Medication 400 MILLIGRAM(S): at 21:32

## 2024-10-23 RX ADMIN — GABAPENTIN 100 MILLIGRAM(S): 300 CAPSULE ORAL at 21:32

## 2024-10-23 RX ADMIN — Medication 10 MILLIGRAM(S): at 13:10

## 2024-10-23 RX ADMIN — ALBUMIN HUMAN 500 MILLILITER(S): 50 SOLUTION INTRAVENOUS at 14:58

## 2024-10-23 RX ADMIN — CALCIUM GLUCONATE 200 GRAM(S): 98 INJECTION, SOLUTION INTRAVENOUS at 21:02

## 2024-10-23 RX ADMIN — HYDRALAZINE HYDROCHLORIDE 10 MILLIGRAM(S): 50 TABLET, FILM COATED ORAL at 13:40

## 2024-10-23 RX ADMIN — Medication 100 MILLIGRAM(S): at 19:23

## 2024-10-23 RX ADMIN — Medication 50 MILLIEQUIVALENT(S): at 17:44

## 2024-10-23 RX ADMIN — Medication 0.4 MILLIGRAM(S): at 21:32

## 2024-10-23 RX ADMIN — NOREPINEPHRINE BITARTRATE 5.82 MICROGRAM(S)/KG/MIN: 1 INJECTION, SOLUTION, CONCENTRATE INTRAVENOUS at 15:26

## 2024-10-23 RX ADMIN — Medication 81 MILLIGRAM(S): at 19:02

## 2024-10-23 RX ADMIN — Medication 10 MILLIGRAM(S): at 21:32

## 2024-10-23 RX ADMIN — Medication 1000 MILLIGRAM(S): at 22:00

## 2024-10-23 RX ADMIN — CALCIUM GLUCONATE 200 GRAM(S): 98 INJECTION, SOLUTION INTRAVENOUS at 15:05

## 2024-10-23 RX ADMIN — MIDODRINE HYDROCHLORIDE 10 MILLIGRAM(S): 2.5 TABLET ORAL at 18:00

## 2024-10-23 RX ADMIN — HEPARIN SODIUM 5000 UNIT(S): 10000 INJECTION INTRAVENOUS; SUBCUTANEOUS at 21:32

## 2024-10-23 RX ADMIN — Medication 50 MILLIEQUIVALENT(S): at 16:40

## 2024-10-23 RX ADMIN — ALBUMIN HUMAN 500 MILLILITER(S): 50 SOLUTION INTRAVENOUS at 14:30

## 2024-10-23 RX ADMIN — Medication 1000 MILLIGRAM(S): at 06:16

## 2024-10-23 RX ADMIN — Medication 500 MILLIGRAM(S): at 17:14

## 2024-10-23 RX ADMIN — CALCIUM GLUCONATE 200 GRAM(S): 98 INJECTION, SOLUTION INTRAVENOUS at 17:14

## 2024-10-23 RX ADMIN — Medication 1000 MILLIGRAM(S): at 15:15

## 2024-10-23 NOTE — BRIEF OPERATIVE NOTE - NSICDXBRIEFPROCEDURE_GEN_ALL_CORE_FT
PROCEDURES:  Replacement, mitral valve, with MARLEEN, adult 23-Oct-2024 12:29:59 Robotic MV repalcement EF 45% Jennifer Kingsley N

## 2024-10-23 NOTE — PROGRESS NOTE ADULT - SUBJECTIVE AND OBJECTIVE BOX
CTICU  CRITICAL  CARE  attending     Hand off received 					   Pertinent clinical, laboratory, radiographic, hemodynamic, echocardiographic, respiratory data, microbiologic data and chart were reviewed and analyzed frequently throughout the course of the day and night  Patient seen and examined with CTS/ SH attending at bedside  Pt is a 58y , Male, HEALTH ISSUES - PROBLEM Dx:      , FAMILY HISTORY:  PAST MEDICAL & SURGICAL HISTORY:  DM2 (diabetes mellitus, type 2)      HTN (hypertension)      HLD (hyperlipidemia)      Severe mitral valve stenosis      CAD (coronary artery disease)      S/P MVR (mitral valve repair)        Patient is a 58y old  Male who presents with a chief complaint of Mitral Valve Stenosis (26 Oct 2024 13:04)      14 system review was unremarkable    Vital signs, hemodynamic and respiratory parameters were reviewed from the bedside nursing flowsheet.  ICU Vital Signs Last 24 Hrs  T(C): 37.1 (26 Oct 2024 17:08), Max: 39 (25 Oct 2024 22:28)  T(F): 98.8 (26 Oct 2024 17:08), Max: 102.2 (25 Oct 2024 22:28)  HR: 96 (26 Oct 2024 16:49) (87 - 109)  BP: 101/61 (26 Oct 2024 16:49) (101/61 - 121/60)  BP(mean): 76 (26 Oct 2024 16:49) (75 - 79)  ABP: --  ABP(mean): --  RR: 23 (26 Oct 2024 16:49) (18 - 28)  SpO2: 98% (26 Oct 2024 16:49) (95% - 98%)    O2 Parameters below as of 26 Oct 2024 16:49  Patient On (Oxygen Delivery Method): room air          Adult Advanced Hemodynamics Last 24 Hrs  CVP(mm Hg): --  CVP(cm H2O): --  CO: --  CI: --  PA: --  PA(mean): --  PCWP: --  SVR: --  SVRI: --  PVR: --  PVRI: --, ABG - ( 25 Oct 2024 04:05 )  pH, Arterial: 7.41  pH, Blood: x     /  pCO2: 35    /  pO2: 100   / HCO3: 22    / Base Excess: -1.9  /  SaO2: 98.6                Intake and output was reviewed and the fluid balance was calculated  Daily     Daily   I&O's Summary    25 Oct 2024 07:01  -  26 Oct 2024 07:00  --------------------------------------------------------  IN: 370 mL / OUT: 1425 mL / NET: -1055 mL    26 Oct 2024 07:01  -  26 Oct 2024 20:00  --------------------------------------------------------  IN: 737 mL / OUT: 995 mL / NET: -258 mL        All lines and drain sites were assessed  [facility]      POCT Blood Glucose.: 200 mg/dL (26 Oct 2024 16:58)    No acute change in mental status  Auscultation of the chest reveals equal bs  Abdomen is soft  Extremities are warm and well perfused  Wounds appear clean and unremarkable  Antibiotics are periop    labs  CBC Full  -  ( 26 Oct 2024 06:38 )  WBC Count : 9.29 K/uL  RBC Count : 3.16 M/uL  Hemoglobin : 9.9 g/dL  Hematocrit : 29.6 %  Platelet Count - Automated : 99 K/uL  Mean Cell Volume : 93.7 fl  Mean Cell Hemoglobin : 31.3 pg  Mean Cell Hemoglobin Concentration : 33.4 gm/dL  Auto Neutrophil # : x  Auto Lymphocyte # : x  Auto Monocyte # : x  Auto Eosinophil # : x  Auto Basophil # : x  Auto Neutrophil % : x  Auto Lymphocyte % : x  Auto Monocyte % : x  Auto Eosinophil % : x  Auto Basophil % : x    10-26    137  |  102  |  9   ----------------------------<  135[H]  3.6   |  25  |  0.75    Ca    8.0[L]      26 Oct 2024 06:38  Phos  1.1     10-25  Mg     2.1     10-26    TPro  6.0  /  Alb  3.5  /  TBili  0.6  /  DBili  x   /  AST  30  /  ALT  8[L]  /  AlkPhos  39[L]  10-25    PT/INR - ( 25 Oct 2024 04:06 )   PT: 13.4 sec;   INR: 1.17          PTT - ( 25 Oct 2024 04:06 )  PTT:30.5 sec  The current medications were reviewed   MEDICATIONS  (STANDING):  ascorbic acid 500 milliGRAM(s) Oral two times a day  aspirin enteric coated 81 milliGRAM(s) Oral daily  atorvastatin 10 milliGRAM(s) Oral at bedtime  bacitracin   Ointment 1 Application(s) Topical daily  chlorhexidine 2% Cloths 1 Application(s) Topical daily  dextrose 5%. 1000 milliLiter(s) (100 mL/Hr) IV Continuous <Continuous>  dextrose 5%. 1000 milliLiter(s) (50 mL/Hr) IV Continuous <Continuous>  dextrose 50% Injectable 25 milliLiter(s) IV Push every 15 minutes  dextrose 50% Injectable 50 milliLiter(s) IV Push every 15 minutes  furosemide    Tablet 40 milliGRAM(s) Oral daily  gabapentin 100 milliGRAM(s) Oral every 8 hours  glucagon  Injectable 1 milliGRAM(s) IntraMuscular once  heparin   Injectable 5000 Unit(s) SubCutaneous every 8 hours  insulin lispro (ADMELOG) corrective regimen sliding scale   SubCutaneous Before meals and at bedtime  methylPREDNISolone 24 milliGRAM(s) Oral once  methylPREDNISolone   Oral   midodrine 10 milliGRAM(s) Oral every 8 hours  mupirocin 2% Ointment 1 Application(s) Both Nostrils two times a day  pantoprazole    Tablet 40 milliGRAM(s) Oral daily  polyethylene glycol 3350 17 Gram(s) Oral daily  potassium chloride    Tablet ER 20 milliEquivalent(s) Oral daily  senna 2 Tablet(s) Oral at bedtime  sodium chloride 0.9% lock flush 3 milliLiter(s) IV Push every 8 hours  tamsulosin 0.4 milliGRAM(s) Oral at bedtime    MEDICATIONS  (PRN):  acetaminophen     Tablet .. 650 milliGRAM(s) Oral every 6 hours PRN Mild Pain (1 - 3)  dextrose Oral Gel 15 Gram(s) Oral once PRN Blood Glucose LESS THAN 70 milliGRAM(s)/deciliter  oxyCODONE    IR 5 milliGRAM(s) Oral every 6 hours PRN Moderate Pain (4 - 6)       PROBLEM LIST/ ASSESSMENT:  HEALTH ISSUES - PROBLEM Dx:      ,   Patient is a 58y old  Male who presents with a chief complaint of Mitral Valve Stenosis (26 Oct 2024 13:04)     s/p cardiac surgery      Vasogenic shock due to hypotension in the cticu , will keep on pressors    expected post - op Hypovolemic shock - > 20% intravascular depletion will replete volume    Acute blood loss anemia with relative hypotension treated with > 1 unit PC    Acute post operative pulmonary insufficiency ruled in due to hypoxemia, O2 sats < 91% on RA treated with HFNC              My plan includes :    volumize    replete pc    hfnc      close hemodynamic, ventilatory and drain monitoring and management per post op routine    Monitor for arrhythmias and monitor parameters for organ perfusion  beta blockade not administered due to hemodynamic instability and bradycardia  monitor neurologic status  Head of the bed should remain elevated to 45 deg .   chest PT and IS will be encouraged  monitor adequacy of oxygenation and ventilation and attempt to wean oxygen  antibiotic regimen will be tailored to the clinical, laboratory and microbiologic data  Nutritional goals will be met using po eventually , ensure adequate caloric intake and montior the same  Stress ulcer and VTE prophylaxis will be achieved    Glycemic control is satisfactory  Electrolytes have been repleted as necessary and wound care has been carried out. Pain control has been achieved.   agressive physical therapy and early mobility and ambulation goals will be met   The family was updated about the course and plan  CRITICAL CARE TIME Upon my evaluation, this patient had a high probability of imminent or life-threatening deterioration due to the above problems which required my direct attention, intervention, and personal management.  I have personally provided 150 minutes of critical care time exclusive of time spent on separately billable procedures. Time included review of laboratory data, radiology results, discussion with consultants, and monitoring for potential decompensation. Interventions were performed as documented abovepersonally provided by me  in evaluation and management, reassessments, review and interpretation of labs and x-rays, ventilator and hemodynamic management, formulating a plan and coordinating care: ___150___ MIN.  Time does not include procedural time.    CTICU ATTENDING     					    Ean Francis MD

## 2024-10-23 NOTE — PROGRESS NOTE ADULT - SUBJECTIVE AND OBJECTIVE BOX
CTICU  CRITICAL  CARE  attending     Hand off received 					   Pertinent clinical, laboratory, radiographic, hemodynamic, echocardiographic, respiratory data, microbiologic data and chart were reviewed and analyzed frequently throughout the course of the day and night        58 year old male with HTN, HLD, DM-2, mild CAD and mitral valve prolapse s/p MV repair w/28mm Jaymie ring in 2011 @ [facility] presents with severe mitral valve stenosis.   He was evaluated by his cardiologist, Dr. Vazquez, for substernal chest pressure and dyspnea on exertion.  TTE: LVEF 64%; s/p MV Ring with Severe MS. 8/11/24 Stress echo negative for ischemia.   Cardiac cath revealed non-obstructive CAD.   MARLEEN revealed severe mitral stenosis, mild MR, normal LV function.    S/P MVR        FAMILY HISTORY:  PAST MEDICAL & SURGICAL HISTORY:  DM2 (diabetes mellitus, type 2)  HTN (hypertension)  HLD (hyperlipidemia)  Severe mitral valve stenosis  CAD (coronary artery disease)  S/P MVR (mitral valve repair)          14 system review was unremarkable    Vital signs, hemodynamic and respiratory parameters were reviewed from the bedside nursing flow sheet.  ICU Vital Signs Last 24 Hrs  T(C): 36.6 (23 Oct 2024 22:45), Max: 36.8 (23 Oct 2024 19:00)  T(F): 97.8 (23 Oct 2024 22:45), Max: 98.2 (23 Oct 2024 19:00)  HR: 65 (23 Oct 2024 23:00) (59 - 82)  BP: 137/78 (23 Oct 2024 13:30) (115/68 - 137/78)  BP(mean): 102 (23 Oct 2024 13:30) (102 - 102)  ABP: 101/41 (23 Oct 2024 23:00) (88/40 - 151/70)  ABP(mean): 58 (23 Oct 2024 23:00) (54 - 101)  RR: 17 (23 Oct 2024 23:00) (11 - 20)  SpO2: 95% (23 Oct 2024 23:00) (94% - 100%)    O2 Parameters below as of 23 Oct 2024 23:00  Patient On (Oxygen Delivery Method): nasal cannula w/ humidification  O2 Flow (L/min): 40  O2 Concentration (%): 21      Adult Advanced Hemodynamics Last 24 Hrs  CVP(mm Hg): 11 (23 Oct 2024 23:00) (2 - 15)  CVP(cm H2O): --  CO: --  CI: --  PA: --  PA(mean): --  PCWP: --  SVR: --  SVRI: --  PVR: --  PVRI: --, ABG - ( 23 Oct 2024 22:30 )  pH, Arterial: 7.33  pH, Blood: x     /  pCO2: 35    /  pO2: 87    / HCO3: 18    / Base Excess: -6.6  /  SaO2: 98.7                Intake and output was reviewed and the fluid balance was calculated  Daily Height in cm: 170.18 (23 Oct 2024 09:17)    Daily   I&O's Summary    23 Oct 2024 07:01  -  23 Oct 2024 23:25  --------------------------------------------------------  IN: 2309.9 mL / OUT: 2170 mL / NET: 139.9 mL        All lines and drain sites were assessed        Neuro: No change in the mental status from the baseline.  Neck: No JVD.  CVS: S1, S2, No S3.  Lungs: Good air entry bilaterally.  Abd: Soft. No tenderness. + Bowel sounds.  Vascular: + DP/PT.  Extremities: No edema.  Lymphatic: Normal.  Skin: No abnormalities.      labs  CBC Full  -  ( 23 Oct 2024 22:31 )  WBC Count : 9.38 K/uL  RBC Count : 3.26 M/uL  Hemoglobin : 10.2 g/dL  Hematocrit : 31.2 %  Platelet Count - Automated : 132 K/uL  Mean Cell Volume : 95.7 fl  Mean Cell Hemoglobin : 31.3 pg  Mean Cell Hemoglobin Concentration : 32.7 gm/dL  Auto Neutrophil # : 7.98 K/uL  Auto Lymphocyte # : 0.64 K/uL  Auto Monocyte # : 0.69 K/uL  Auto Eosinophil # : 0.01 K/uL  Auto Basophil # : 0.01 K/uL  Auto Neutrophil % : 85.1 %  Auto Lymphocyte % : 6.8 %  Auto Monocyte % : 7.4 %  Auto Eosinophil % : 0.1 %  Auto Basophil % : 0.1 %    10-23    137  |  102  |  12  ----------------------------<  171[H]  4.4   |  20[L]  |  0.84    Ca    8.9      23 Oct 2024 22:31  Phos  4.2     10-23  Mg     2.0     10-23    TPro  6.4  /  Alb  4.1  /  TBili  0.7  /  DBili  x   /  AST  61[H]  /  ALT  14  /  AlkPhos  39[L]  10-23    PT/INR - ( 23 Oct 2024 22:31 )   PT: 12.5 sec;   INR: 1.07          PTT - ( 23 Oct 2024 22:31 )  PTT:30.5 sec  The current medications were reviewed   MEDICATIONS  (STANDING):  acetaminophen   IVPB .. 1000 milliGRAM(s) IV Intermittent every 6 hours  ascorbic acid 500 milliGRAM(s) Oral two times a day  aspirin enteric coated 81 milliGRAM(s) Oral daily  atorvastatin 10 milliGRAM(s) Oral at bedtime  ceFAZolin   IVPB 2000 milliGRAM(s) IV Intermittent every 8 hours  chlorhexidine 2% Cloths 1 Application(s) Topical daily  dextrose 5%. 1000 milliLiter(s) (50 mL/Hr) IV Continuous <Continuous>  dextrose 5%. 1000 milliLiter(s) (100 mL/Hr) IV Continuous <Continuous>  dextrose 50% Injectable 25 milliLiter(s) IV Push every 15 minutes  dextrose 50% Injectable 50 milliLiter(s) IV Push every 15 minutes  gabapentin 100 milliGRAM(s) Oral every 8 hours  glucagon  Injectable 1 milliGRAM(s) IntraMuscular once  heparin   Injectable 5000 Unit(s) SubCutaneous every 8 hours  insulin lispro (ADMELOG) corrective regimen sliding scale   SubCutaneous Before meals and at bedtime  midodrine 10 milliGRAM(s) Oral every 8 hours  mupirocin 2% Ointment 1 Application(s) Both Nostrils two times a day  norepinephrine Infusion 0.05 MICROgram(s)/kG/Min (5.82 mL/Hr) IV Continuous <Continuous>  pantoprazole    Tablet 40 milliGRAM(s) Oral daily  polyethylene glycol 3350 17 Gram(s) Oral daily  sodium chloride 0.9%. 1000 milliLiter(s) (10 mL/Hr) IV Continuous <Continuous>  tamsulosin 0.4 milliGRAM(s) Oral at bedtime    MEDICATIONS  (PRN):  dextrose Oral Gel 15 Gram(s) Oral once PRN Blood Glucose LESS THAN 70 milliGRAM(s)/deciliter        58 year old  Male admitted with Mitral Valve Stenosis   S/P MVR  Hemodynamically stable.  Good oxygenation.  Fair urine out put.        My plan includes :  Gentle diuresis  Afterload reduction.  Statin and Betablocker.  Close hemodynamic monitoring   Monitor for arrhythmias and monitor parameters for organ perfusion  Monitor neurologic status  Monitor renal function.  Head of the bed should remain elevated to 45 deg .   Chest PT and IS will be encouraged  Monitor adequacy of oxygenation and ventilation and attempt to wean oxygen  Nutritional goals will be met using po eventually , ensure adequate caloric intake and monitor the same  Stress ulcer and VTE prophylaxis will be achieved    OPTIMIZE Glycemic control   Electrolytes have been repleted as necessary and wound care has been carried out. Pain control has been achieved.   Aggressive physical therapy and early mobility and ambulation goals will be met   The family was updated about the course and plan  CRITICAL CARE TIME SPENT in evaluation and management, reassessments, review and interpretation of labs and x-rays, hemodynamic management, formulating a plan and coordinating care: ___30____ MIN.  Time does not include procedural time.  CTICU ATTENDING     					    Mauro Cunha MD

## 2024-10-23 NOTE — BRIEF OPERATIVE NOTE - COMMENTS
I first assisted for the entirety of the case, including but not limited to opening, cannulation, valve repair/replacement, decannulation, and closure.

## 2024-10-24 LAB
ADD ON TEST-SPECIMEN IN LAB: SIGNIFICANT CHANGE UP
ALBUMIN SERPL ELPH-MCNC: 3.6 G/DL — SIGNIFICANT CHANGE UP (ref 3.3–5)
ALBUMIN SERPL ELPH-MCNC: 4.1 G/DL — SIGNIFICANT CHANGE UP (ref 3.3–5)
ALBUMIN SERPL ELPH-MCNC: 4.3 G/DL — SIGNIFICANT CHANGE UP (ref 3.3–5)
ALP SERPL-CCNC: 37 U/L — LOW (ref 40–120)
ALP SERPL-CCNC: 39 U/L — LOW (ref 40–120)
ALP SERPL-CCNC: 39 U/L — LOW (ref 40–120)
ALT FLD-CCNC: 11 U/L — SIGNIFICANT CHANGE UP (ref 10–45)
ALT FLD-CCNC: 13 U/L — SIGNIFICANT CHANGE UP (ref 10–45)
ALT FLD-CCNC: 9 U/L — LOW (ref 10–45)
ANION GAP SERPL CALC-SCNC: 11 MMOL/L — SIGNIFICANT CHANGE UP (ref 5–17)
ANION GAP SERPL CALC-SCNC: 11 MMOL/L — SIGNIFICANT CHANGE UP (ref 5–17)
ANION GAP SERPL CALC-SCNC: 14 MMOL/L — SIGNIFICANT CHANGE UP (ref 5–17)
APTT BLD: 27.5 SEC — SIGNIFICANT CHANGE UP (ref 24.5–35.6)
APTT BLD: 29.2 SEC — SIGNIFICANT CHANGE UP (ref 24.5–35.6)
APTT BLD: 29.5 SEC — SIGNIFICANT CHANGE UP (ref 24.5–35.6)
AST SERPL-CCNC: 38 U/L — SIGNIFICANT CHANGE UP (ref 10–40)
AST SERPL-CCNC: 43 U/L — HIGH (ref 10–40)
AST SERPL-CCNC: 51 U/L — HIGH (ref 10–40)
B-OH-BUTYR SERPL-SCNC: 2.1 MMOL/L — HIGH
BASOPHILS # BLD AUTO: 0.02 K/UL — SIGNIFICANT CHANGE UP (ref 0–0.2)
BASOPHILS # BLD AUTO: 0.03 K/UL — SIGNIFICANT CHANGE UP (ref 0–0.2)
BASOPHILS # BLD AUTO: 0.04 K/UL — SIGNIFICANT CHANGE UP (ref 0–0.2)
BASOPHILS NFR BLD AUTO: 0.2 % — SIGNIFICANT CHANGE UP (ref 0–2)
BASOPHILS NFR BLD AUTO: 0.3 % — SIGNIFICANT CHANGE UP (ref 0–2)
BASOPHILS NFR BLD AUTO: 0.4 % — SIGNIFICANT CHANGE UP (ref 0–2)
BILIRUB SERPL-MCNC: 0.4 MG/DL — SIGNIFICANT CHANGE UP (ref 0.2–1.2)
BILIRUB SERPL-MCNC: 0.7 MG/DL — SIGNIFICANT CHANGE UP (ref 0.2–1.2)
BILIRUB SERPL-MCNC: 0.7 MG/DL — SIGNIFICANT CHANGE UP (ref 0.2–1.2)
BUN SERPL-MCNC: 11 MG/DL — SIGNIFICANT CHANGE UP (ref 7–23)
BUN SERPL-MCNC: 11 MG/DL — SIGNIFICANT CHANGE UP (ref 7–23)
BUN SERPL-MCNC: 9 MG/DL — SIGNIFICANT CHANGE UP (ref 7–23)
CALCIUM SERPL-MCNC: 8.2 MG/DL — LOW (ref 8.4–10.5)
CALCIUM SERPL-MCNC: 8.3 MG/DL — LOW (ref 8.4–10.5)
CALCIUM SERPL-MCNC: 8.3 MG/DL — LOW (ref 8.4–10.5)
CHLORIDE SERPL-SCNC: 100 MMOL/L — SIGNIFICANT CHANGE UP (ref 96–108)
CHLORIDE SERPL-SCNC: 101 MMOL/L — SIGNIFICANT CHANGE UP (ref 96–108)
CHLORIDE SERPL-SCNC: 103 MMOL/L — SIGNIFICANT CHANGE UP (ref 96–108)
CO2 SERPL-SCNC: 21 MMOL/L — LOW (ref 22–31)
CO2 SERPL-SCNC: 22 MMOL/L — SIGNIFICANT CHANGE UP (ref 22–31)
CO2 SERPL-SCNC: 22 MMOL/L — SIGNIFICANT CHANGE UP (ref 22–31)
CREAT SERPL-MCNC: 0.69 MG/DL — SIGNIFICANT CHANGE UP (ref 0.5–1.3)
CREAT SERPL-MCNC: 0.8 MG/DL — SIGNIFICANT CHANGE UP (ref 0.5–1.3)
CREAT SERPL-MCNC: 0.89 MG/DL — SIGNIFICANT CHANGE UP (ref 0.5–1.3)
EGFR: 100 ML/MIN/1.73M2 — SIGNIFICANT CHANGE UP
EGFR: 103 ML/MIN/1.73M2 — SIGNIFICANT CHANGE UP
EGFR: 108 ML/MIN/1.73M2 — SIGNIFICANT CHANGE UP
EOSINOPHIL # BLD AUTO: 0 K/UL — SIGNIFICANT CHANGE UP (ref 0–0.5)
EOSINOPHIL # BLD AUTO: 0.01 K/UL — SIGNIFICANT CHANGE UP (ref 0–0.5)
EOSINOPHIL # BLD AUTO: 0.02 K/UL — SIGNIFICANT CHANGE UP (ref 0–0.5)
EOSINOPHIL NFR BLD AUTO: 0 % — SIGNIFICANT CHANGE UP (ref 0–6)
EOSINOPHIL NFR BLD AUTO: 0.1 % — SIGNIFICANT CHANGE UP (ref 0–6)
EOSINOPHIL NFR BLD AUTO: 0.2 % — SIGNIFICANT CHANGE UP (ref 0–6)
GAS PNL BLDA: SIGNIFICANT CHANGE UP
GLUCOSE BLDC GLUCOMTR-MCNC: 149 MG/DL — HIGH (ref 70–99)
GLUCOSE BLDC GLUCOMTR-MCNC: 158 MG/DL — HIGH (ref 70–99)
GLUCOSE BLDC GLUCOMTR-MCNC: 169 MG/DL — HIGH (ref 70–99)
GLUCOSE BLDC GLUCOMTR-MCNC: 216 MG/DL — HIGH (ref 70–99)
GLUCOSE SERPL-MCNC: 145 MG/DL — HIGH (ref 70–99)
GLUCOSE SERPL-MCNC: 173 MG/DL — HIGH (ref 70–99)
GLUCOSE SERPL-MCNC: 193 MG/DL — HIGH (ref 70–99)
HCT VFR BLD CALC: 30.2 % — LOW (ref 39–50)
HCT VFR BLD CALC: 30.7 % — LOW (ref 39–50)
HCT VFR BLD CALC: 32.2 % — LOW (ref 39–50)
HGB BLD-MCNC: 10.3 G/DL — LOW (ref 13–17)
HGB BLD-MCNC: 10.5 G/DL — LOW (ref 13–17)
HGB BLD-MCNC: 10.5 G/DL — LOW (ref 13–17)
IMM GRANULOCYTES NFR BLD AUTO: 0.2 % — SIGNIFICANT CHANGE UP (ref 0–0.9)
IMM GRANULOCYTES NFR BLD AUTO: 0.3 % — SIGNIFICANT CHANGE UP (ref 0–0.9)
IMM GRANULOCYTES NFR BLD AUTO: 0.4 % — SIGNIFICANT CHANGE UP (ref 0–0.9)
INR BLD: 1.1 — SIGNIFICANT CHANGE UP (ref 0.85–1.16)
INR BLD: 1.16 — SIGNIFICANT CHANGE UP (ref 0.85–1.16)
INR BLD: 1.29 — HIGH (ref 0.85–1.16)
LYMPHOCYTES # BLD AUTO: 0.85 K/UL — LOW (ref 1–3.3)
LYMPHOCYTES # BLD AUTO: 1.27 K/UL — SIGNIFICANT CHANGE UP (ref 1–3.3)
LYMPHOCYTES # BLD AUTO: 1.34 K/UL — SIGNIFICANT CHANGE UP (ref 1–3.3)
LYMPHOCYTES # BLD AUTO: 12.6 % — LOW (ref 13–44)
LYMPHOCYTES # BLD AUTO: 15.4 % — SIGNIFICANT CHANGE UP (ref 13–44)
LYMPHOCYTES # BLD AUTO: 7.9 % — LOW (ref 13–44)
MAGNESIUM SERPL-MCNC: 1.8 MG/DL — SIGNIFICANT CHANGE UP (ref 1.6–2.6)
MAGNESIUM SERPL-MCNC: 1.9 MG/DL — SIGNIFICANT CHANGE UP (ref 1.6–2.6)
MAGNESIUM SERPL-MCNC: 2.7 MG/DL — HIGH (ref 1.6–2.6)
MCHC RBC-ENTMCNC: 30.8 PG — SIGNIFICANT CHANGE UP (ref 27–34)
MCHC RBC-ENTMCNC: 31.5 PG — SIGNIFICANT CHANGE UP (ref 27–34)
MCHC RBC-ENTMCNC: 32.6 GM/DL — SIGNIFICANT CHANGE UP (ref 32–36)
MCHC RBC-ENTMCNC: 32.9 PG — SIGNIFICANT CHANGE UP (ref 27–34)
MCHC RBC-ENTMCNC: 33.6 GM/DL — SIGNIFICANT CHANGE UP (ref 32–36)
MCHC RBC-ENTMCNC: 34.8 GM/DL — SIGNIFICANT CHANGE UP (ref 32–36)
MCV RBC AUTO: 93.9 FL — SIGNIFICANT CHANGE UP (ref 80–100)
MCV RBC AUTO: 94.4 FL — SIGNIFICANT CHANGE UP (ref 80–100)
MCV RBC AUTO: 94.7 FL — SIGNIFICANT CHANGE UP (ref 80–100)
MONOCYTES # BLD AUTO: 1.06 K/UL — HIGH (ref 0–0.9)
MONOCYTES # BLD AUTO: 1.23 K/UL — HIGH (ref 0–0.9)
MONOCYTES # BLD AUTO: 1.3 K/UL — HIGH (ref 0–0.9)
MONOCYTES NFR BLD AUTO: 11.6 % — SIGNIFICANT CHANGE UP (ref 2–14)
MONOCYTES NFR BLD AUTO: 12.1 % — SIGNIFICANT CHANGE UP (ref 2–14)
MONOCYTES NFR BLD AUTO: 12.8 % — SIGNIFICANT CHANGE UP (ref 2–14)
NEUTROPHILS # BLD AUTO: 5.89 K/UL — SIGNIFICANT CHANGE UP (ref 1.8–7.4)
NEUTROPHILS # BLD AUTO: 7.94 K/UL — HIGH (ref 1.8–7.4)
NEUTROPHILS # BLD AUTO: 8.52 K/UL — HIGH (ref 1.8–7.4)
NEUTROPHILS NFR BLD AUTO: 71.4 % — SIGNIFICANT CHANGE UP (ref 43–77)
NEUTROPHILS NFR BLD AUTO: 74.9 % — SIGNIFICANT CHANGE UP (ref 43–77)
NEUTROPHILS NFR BLD AUTO: 79.2 % — HIGH (ref 43–77)
NRBC # BLD: 0 /100 WBCS — SIGNIFICANT CHANGE UP (ref 0–0)
PHOSPHATE SERPL-MCNC: 2 MG/DL — LOW (ref 2.5–4.5)
PHOSPHATE SERPL-MCNC: 2.7 MG/DL — SIGNIFICANT CHANGE UP (ref 2.5–4.5)
PHOSPHATE SERPL-MCNC: 3.8 MG/DL — SIGNIFICANT CHANGE UP (ref 2.5–4.5)
PLATELET # BLD AUTO: 101 K/UL — LOW (ref 150–400)
PLATELET # BLD AUTO: 113 K/UL — LOW (ref 150–400)
PLATELET # BLD AUTO: 131 K/UL — LOW (ref 150–400)
POTASSIUM SERPL-MCNC: 3.7 MMOL/L — SIGNIFICANT CHANGE UP (ref 3.5–5.3)
POTASSIUM SERPL-MCNC: 4.1 MMOL/L — SIGNIFICANT CHANGE UP (ref 3.5–5.3)
POTASSIUM SERPL-MCNC: 4.1 MMOL/L — SIGNIFICANT CHANGE UP (ref 3.5–5.3)
POTASSIUM SERPL-SCNC: 3.7 MMOL/L — SIGNIFICANT CHANGE UP (ref 3.5–5.3)
POTASSIUM SERPL-SCNC: 4.1 MMOL/L — SIGNIFICANT CHANGE UP (ref 3.5–5.3)
POTASSIUM SERPL-SCNC: 4.1 MMOL/L — SIGNIFICANT CHANGE UP (ref 3.5–5.3)
PROT SERPL-MCNC: 6 G/DL — SIGNIFICANT CHANGE UP (ref 6–8.3)
PROT SERPL-MCNC: 6.3 G/DL — SIGNIFICANT CHANGE UP (ref 6–8.3)
PROT SERPL-MCNC: 6.5 G/DL — SIGNIFICANT CHANGE UP (ref 6–8.3)
PROTHROM AB SERPL-ACNC: 12.8 SEC — SIGNIFICANT CHANGE UP (ref 9.9–13.4)
PROTHROM AB SERPL-ACNC: 13.3 SEC — SIGNIFICANT CHANGE UP (ref 9.9–13.4)
PROTHROM AB SERPL-ACNC: 14.8 SEC — HIGH (ref 9.9–13.4)
RBC # BLD: 3.19 M/UL — LOW (ref 4.2–5.8)
RBC # BLD: 3.27 M/UL — LOW (ref 4.2–5.8)
RBC # BLD: 3.41 M/UL — LOW (ref 4.2–5.8)
RBC # FLD: 13.8 % — SIGNIFICANT CHANGE UP (ref 10.3–14.5)
RBC # FLD: 14.1 % — SIGNIFICANT CHANGE UP (ref 10.3–14.5)
RBC # FLD: 14.3 % — SIGNIFICANT CHANGE UP (ref 10.3–14.5)
SODIUM SERPL-SCNC: 133 MMOL/L — LOW (ref 135–145)
SODIUM SERPL-SCNC: 136 MMOL/L — SIGNIFICANT CHANGE UP (ref 135–145)
SODIUM SERPL-SCNC: 136 MMOL/L — SIGNIFICANT CHANGE UP (ref 135–145)
WBC # BLD: 10.6 K/UL — HIGH (ref 3.8–10.5)
WBC # BLD: 10.75 K/UL — HIGH (ref 3.8–10.5)
WBC # BLD: 8.26 K/UL — SIGNIFICANT CHANGE UP (ref 3.8–10.5)
WBC # FLD AUTO: 10.6 K/UL — HIGH (ref 3.8–10.5)
WBC # FLD AUTO: 10.75 K/UL — HIGH (ref 3.8–10.5)
WBC # FLD AUTO: 8.26 K/UL — SIGNIFICANT CHANGE UP (ref 3.8–10.5)

## 2024-10-24 PROCEDURE — 99222 1ST HOSP IP/OBS MODERATE 55: CPT

## 2024-10-24 PROCEDURE — 99291 CRITICAL CARE FIRST HOUR: CPT

## 2024-10-24 PROCEDURE — 99292 CRITICAL CARE ADDL 30 MIN: CPT

## 2024-10-24 PROCEDURE — 71045 X-RAY EXAM CHEST 1 VIEW: CPT | Mod: 26

## 2024-10-24 RX ORDER — ACETAMINOPHEN 500 MG
650 TABLET ORAL EVERY 6 HOURS
Refills: 0 | Status: DISCONTINUED | OUTPATIENT
Start: 2024-10-24 | End: 2024-10-29

## 2024-10-24 RX ORDER — POTASSIUM CHLORIDE 10 MEQ
40 TABLET, EXTENDED RELEASE ORAL ONCE
Refills: 0 | Status: COMPLETED | OUTPATIENT
Start: 2024-10-24 | End: 2024-10-24

## 2024-10-24 RX ORDER — ALBUMIN HUMAN 50 G/1000ML
250 SOLUTION INTRAVENOUS ONCE
Refills: 0 | Status: COMPLETED | OUTPATIENT
Start: 2024-10-24 | End: 2024-10-24

## 2024-10-24 RX ORDER — OXYCODONE HYDROCHLORIDE 30 MG/1
5 TABLET ORAL EVERY 6 HOURS
Refills: 0 | Status: DISCONTINUED | OUTPATIENT
Start: 2024-10-24 | End: 2024-10-29

## 2024-10-24 RX ORDER — MAGNESIUM SULFATE IN 0.9% NACL 2 G/50 ML
2 INTRAVENOUS SOLUTION, PIGGYBACK (ML) INTRAVENOUS ONCE
Refills: 0 | Status: COMPLETED | OUTPATIENT
Start: 2024-10-24 | End: 2024-10-24

## 2024-10-24 RX ADMIN — Medication 1000 MILLIGRAM(S): at 10:13

## 2024-10-24 RX ADMIN — MIDODRINE HYDROCHLORIDE 10 MILLIGRAM(S): 2.5 TABLET ORAL at 22:06

## 2024-10-24 RX ADMIN — Medication 400 MILLIGRAM(S): at 02:29

## 2024-10-24 RX ADMIN — POLYETHYLENE GLYCOL 3350 17 GRAM(S): 17 POWDER, FOR SOLUTION ORAL at 11:24

## 2024-10-24 RX ADMIN — Medication 10 MILLIGRAM(S): at 22:05

## 2024-10-24 RX ADMIN — Medication 1000 MILLILITER(S): at 16:43

## 2024-10-24 RX ADMIN — Medication 500 MILLIGRAM(S): at 05:32

## 2024-10-24 RX ADMIN — OXYCODONE HYDROCHLORIDE 5 MILLIGRAM(S): 30 TABLET ORAL at 18:20

## 2024-10-24 RX ADMIN — Medication 1000 MILLIGRAM(S): at 02:59

## 2024-10-24 RX ADMIN — GABAPENTIN 100 MILLIGRAM(S): 300 CAPSULE ORAL at 22:05

## 2024-10-24 RX ADMIN — CHLORHEXIDINE GLUCONATE 1 APPLICATION(S): 40 SOLUTION TOPICAL at 05:31

## 2024-10-24 RX ADMIN — Medication 25 GRAM(S): at 14:41

## 2024-10-24 RX ADMIN — Medication 81 MILLIGRAM(S): at 11:24

## 2024-10-24 RX ADMIN — Medication 1000 MILLILITER(S): at 17:45

## 2024-10-24 RX ADMIN — OXYCODONE HYDROCHLORIDE 5 MILLIGRAM(S): 30 TABLET ORAL at 06:20

## 2024-10-24 RX ADMIN — HEPARIN SODIUM 5000 UNIT(S): 10000 INJECTION INTRAVENOUS; SUBCUTANEOUS at 22:05

## 2024-10-24 RX ADMIN — MUPIROCIN 1 APPLICATION(S): 20 OINTMENT TOPICAL at 05:33

## 2024-10-24 RX ADMIN — MIDODRINE HYDROCHLORIDE 10 MILLIGRAM(S): 2.5 TABLET ORAL at 13:11

## 2024-10-24 RX ADMIN — HEPARIN SODIUM 5000 UNIT(S): 10000 INJECTION INTRAVENOUS; SUBCUTANEOUS at 05:32

## 2024-10-24 RX ADMIN — OXYCODONE HYDROCHLORIDE 5 MILLIGRAM(S): 30 TABLET ORAL at 07:00

## 2024-10-24 RX ADMIN — Medication 400 MILLIGRAM(S): at 09:27

## 2024-10-24 RX ADMIN — Medication 100 MILLIGRAM(S): at 11:24

## 2024-10-24 RX ADMIN — Medication 100 MILLIGRAM(S): at 19:52

## 2024-10-24 RX ADMIN — Medication 40 MILLIEQUIVALENT(S): at 14:42

## 2024-10-24 RX ADMIN — Medication 2: at 22:59

## 2024-10-24 RX ADMIN — ALBUMIN HUMAN 125 MILLILITER(S): 50 SOLUTION INTRAVENOUS at 14:41

## 2024-10-24 RX ADMIN — HEPARIN SODIUM 5000 UNIT(S): 10000 INJECTION INTRAVENOUS; SUBCUTANEOUS at 13:12

## 2024-10-24 RX ADMIN — Medication 2 TABLET(S): at 22:08

## 2024-10-24 RX ADMIN — MIDODRINE HYDROCHLORIDE 10 MILLIGRAM(S): 2.5 TABLET ORAL at 05:32

## 2024-10-24 RX ADMIN — Medication 2: at 11:24

## 2024-10-24 RX ADMIN — OXYCODONE HYDROCHLORIDE 5 MILLIGRAM(S): 30 TABLET ORAL at 18:19

## 2024-10-24 RX ADMIN — ALBUMIN HUMAN 125 MILLILITER(S): 50 SOLUTION INTRAVENOUS at 04:52

## 2024-10-24 RX ADMIN — MUPIROCIN 1 APPLICATION(S): 20 OINTMENT TOPICAL at 17:05

## 2024-10-24 RX ADMIN — GABAPENTIN 100 MILLIGRAM(S): 300 CAPSULE ORAL at 05:32

## 2024-10-24 RX ADMIN — PANTOPRAZOLE SODIUM 40 MILLIGRAM(S): 40 TABLET, DELAYED RELEASE ORAL at 05:32

## 2024-10-24 RX ADMIN — Medication 100 MILLIGRAM(S): at 03:01

## 2024-10-24 RX ADMIN — GABAPENTIN 100 MILLIGRAM(S): 300 CAPSULE ORAL at 13:12

## 2024-10-24 RX ADMIN — ALBUMIN HUMAN 125 MILLILITER(S): 50 SOLUTION INTRAVENOUS at 12:09

## 2024-10-24 RX ADMIN — Medication 4: at 17:01

## 2024-10-24 RX ADMIN — Medication 500 MILLIGRAM(S): at 17:28

## 2024-10-24 NOTE — PROGRESS NOTE ADULT - SUBJECTIVE AND OBJECTIVE BOX
CTICU  CRITICAL  CARE  attending     Hand off received 					   Pertinent clinical, laboratory, radiographic, hemodynamic, echocardiographic, respiratory data, microbiologic data and chart were reviewed and analyzed frequently throughout the course of the day  Patient seen and examined with CTS/ SH attending at bedside  Pt is a 58yr old male with PMH HTN, HLD, DM, CAD, MVP sp MV repair (28mm Jaymie ring 2011, [place]) admitted for severe MS.       9 yo male with PMHx HTN, HLD, DM-2, mild CAD and mitral valve prolapse s/p MV repair w/28mm Jaymie ring in 2011 @ [facility] presents with severe mitral valve stenosis. Patient presented to his cardiologist, Dr. Vazquez, with c/o intermittent substernal chest pressure, non-radiating, occurring upon moderate physical exertion, which improves w/ rest, that has been gradually worsening over the past few weeks. Pt also reported SOB/ELLIOTT with light exertion, and dizziness/lightheadedness. NYHA 3. Pt denies palpitations, diaphoresis, orthopnea/PND, cough, leg swelling, LOC, fall, syncope, N/V/D, fever/chills/sick contact, rash, hematuria, BRBPR, melena/hematochezia. 7/21/24 TTE: LVEF 64%; s/p MV Ring with Severe MS. 8/11/24 Stress echo negative for ischemia. 9/25/24 Cardiac cath revealed non-obstructive CAD. 9/25/24 MARLEEN revealed severe mitral stenosis, mild MR, normal LV function.  ?  Patient was seen in the outpatient office for surgical consult with Dr. Hernandez accompanied by his wife and son. He appears generally well, NAD. 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    Patient seen in same day holding area; Reports no changes to PMHx or medications since last seen by our team. Denies acute or current SOB, chest pain, palpitation, N/V/D, fever/chills, recent illness, or any other concerning symptoms. Pt reports nothing to eat or drink since last night. Pt last took his beta blocker this morning, vit c last night and gatorade this morning           FAMILY HISTORY:  PAST MEDICAL & SURGICAL HISTORY:  DM2 (diabetes mellitus, type 2)  HTN (hypertension)  HLD (hyperlipidemia)  Severe mitral valve stenosis  CAD (coronary artery disease)  S/P MVR (mitral valve repair)        Patient is a 58y old  Male who presents with a chief complaint of Mitral Valve Stenosis.      14 system review was unremarkable    Vital signs, hemodynamic and respiratory parameters were reviewed from the bedside nursing flowsheet.  ICU Vital Signs Last 24 Hrs  T(C): 36.7 (24 Oct 2024 09:13), Max: 37.2 (24 Oct 2024 01:01)  T(F): 98.1 (24 Oct 2024 09:13), Max: 99 (24 Oct 2024 01:01)  HR: 64 (24 Oct 2024 10:00) (59 - 82)  BP: 105/53 (24 Oct 2024 06:00) (105/53 - 137/78)  BP(mean): 74 (24 Oct 2024 06:00) (74 - 102)  ABP: 99/38 (24 Oct 2024 10:00) (88/40 - 151/70)  ABP(mean): 55 (24 Oct 2024 10:00) (54 - 101)  RR: 19 (24 Oct 2024 10:00) (11 - 22)  SpO2: 95% (24 Oct 2024 10:00) (93% - 100%)    O2 Parameters below as of 24 Oct 2024 10:00  Patient On (Oxygen Delivery Method): nasal cannula, high flow  O2 Flow (L/min): 40  O2 Concentration (%): 20      Adult Advanced Hemodynamics Last 24 Hrs  CVP(mm Hg): -1 (24 Oct 2024 10:00) (-2 - 15)  CVP(cm H2O): --  CO: --  CI: --  PA: --  PA(mean): --  PCWP: --  SVR: --  SVRI: --  PVR: --  PVRI: --, ABG - ( 24 Oct 2024 02:25 )  pH, Arterial: 7.35  pH, Blood: x     /  pCO2: 37    /  pO2: 82    / HCO3: 20    / Base Excess: -4.7  /  SaO2: 97.6                Intake and output was reviewed and the fluid balance was calculated  Daily     Daily   I&O's Summary    23 Oct 2024 07:01  -  24 Oct 2024 07:00  --------------------------------------------------------  IN: 2847.2 mL / OUT: 2935 mL / NET: -87.8 mL    24 Oct 2024 07:01  -  24 Oct 2024 10:45  --------------------------------------------------------  IN: 39.3 mL / OUT: 405 mL / NET: -365.7 mL        All lines and drain sites were assessed  [facility]      POCT Blood Glucose.: 149 mg/dL (24 Oct 2024 07:13)      Neuro:  HEENT:  Heart:  Lungs:  Abdomen:  Extremities:    Lines:    Tubes:      labs  CBC Full  -  ( 24 Oct 2024 02:14 )  WBC Count : 8.26 K/uL  RBC Count : 3.41 M/uL  Hemoglobin : 10.5 g/dL  Hematocrit : 32.2 %  Platelet Count - Automated : 131 K/uL  Mean Cell Volume : 94.4 fl  Mean Cell Hemoglobin : 30.8 pg  Mean Cell Hemoglobin Concentration : 32.6 gm/dL  Auto Neutrophil # : 5.89 K/uL  Auto Lymphocyte # : 1.27 K/uL  Auto Monocyte # : 1.06 K/uL  Auto Eosinophil # : 0.00 K/uL  Auto Basophil # : 0.02 K/uL  Auto Neutrophil % : 71.4 %  Auto Lymphocyte % : 15.4 %  Auto Monocyte % : 12.8 %  Auto Eosinophil % : 0.0 %  Auto Basophil % : 0.2 %    10-24    136  |  103  |  11  ----------------------------<  145[H]  4.1   |  22  |  0.89    Ca    8.3[L]      24 Oct 2024 02:14  Phos  3.8     10-24  Mg     1.9     10-24    TPro  6.0  /  Alb  3.6  /  TBili  0.7  /  DBili  x   /  AST  51[H]  /  ALT  13  /  AlkPhos  37[L]  10-24    PT/INR - ( 24 Oct 2024 02:14 )   PT: 12.8 sec;   INR: 1.10          PTT - ( 24 Oct 2024 02:14 )  PTT:27.5 sec  The current medications were reviewed   MEDICATIONS  (STANDING):  ascorbic acid 500 milliGRAM(s) Oral two times a day  aspirin enteric coated 81 milliGRAM(s) Oral daily  atorvastatin 10 milliGRAM(s) Oral at bedtime  ceFAZolin   IVPB 2000 milliGRAM(s) IV Intermittent every 8 hours  chlorhexidine 2% Cloths 1 Application(s) Topical daily  dextrose 5%. 1000 milliLiter(s) (100 mL/Hr) IV Continuous <Continuous>  dextrose 5%. 1000 milliLiter(s) (50 mL/Hr) IV Continuous <Continuous>  dextrose 50% Injectable 50 milliLiter(s) IV Push every 15 minutes  dextrose 50% Injectable 25 milliLiter(s) IV Push every 15 minutes  gabapentin 100 milliGRAM(s) Oral every 8 hours  glucagon  Injectable 1 milliGRAM(s) IntraMuscular once  heparin   Injectable 5000 Unit(s) SubCutaneous every 8 hours  insulin lispro (ADMELOG) corrective regimen sliding scale   SubCutaneous Before meals and at bedtime  midodrine 10 milliGRAM(s) Oral every 8 hours  mupirocin 2% Ointment 1 Application(s) Both Nostrils two times a day  norepinephrine Infusion 0.05 MICROgram(s)/kG/Min (5.82 mL/Hr) IV Continuous <Continuous>  pantoprazole    Tablet 40 milliGRAM(s) Oral daily  polyethylene glycol 3350 17 Gram(s) Oral daily  polyethylene glycol 3350 17 Gram(s) Oral daily  senna 2 Tablet(s) Oral at bedtime  sodium chloride 0.9%. 1000 milliLiter(s) (10 mL/Hr) IV Continuous <Continuous>    MEDICATIONS  (PRN):  dextrose Oral Gel 15 Gram(s) Oral once PRN Blood Glucose LESS THAN 70 milliGRAM(s)/deciliter  oxyCODONE    IR 5 milliGRAM(s) Oral every 6 hours PRN Moderate Pain (4 - 6)      Assessment/Plan:         s/p cardiac surgery    Acute systolic and diastolic heart failure evidenced by SOB and parenchymal infiltrates; will treat with diuresis    Cardiogenic shock on ionotropy    Vasogenic shock due to hypotension in the cticu , will keep on pressors    Hypovolemic shock - > 20% intravascular depletion will replete volume    Acute blood loss anemia with relative hypotension treated with > 1 unit PC    Acute respiratory failure ruled in due to hypoxemia, O2 sats < 91% on RA treated with HFNC    Acute respiratory failure ruled in due to hypercapnea on abg will treat with mechanical ventilation    Acute respiratory failure ruled in due to prolonged mechanical ventilation > 24 hrs on the vent due to failure to wean due to weak respiratory mechanics    Toxic metabolic encephalopathy ; sundowning due to anesthesia pain medications    Acidosis evidenced by anion gap and negative base excess    Acute kidney injury - creatinine > 0.3 due to combined prerenal and intrarenal factors can presume ATN    ESRD    Acute shellie-operative ischemic stroke    Moderate protein calorie malutrition    Diet as tolerated  Replete lytes prn  Monitor CT output  GI/DVT PPX  Bowel Regimen  Pain control  OOB with PT    Titrate pressor support to maintain MAP >70  Titrate inotrope support to maintain CI >2.2/MVO2 >60  Close hemodynamic, ventilatory and drain monitoring and management per post op routine  Monitor for arrhythmias and monitor parameters for organ perfusion  Beta blockade not administered due to hemodynamic instability and bradycardia  Monitor neurologic status  Head of the bed should remain elevated to 45 deg   Chest PT and IS will be encouraged  Monitor adequacy of oxygenation and ventilation and attempt to wean oxygen  Antibiotic regimen will be tailored to the clinical, laboratory and microbiologic data  Nutritional goals will be met using po eventually, ensure adequate caloric intake and monitor the same  Stress ulcer and VTE prophylaxis will be achieved    Glycemic control is satisfactory  Electrolytes have been repleted as necessary and wound care has been carried out   Pain control has been achieved.   Aggressive physical therapy and early mobility and ambulation goals will be met   The family was updated about the course and plan.    CRITICAL CARE TIME personally provided by me  in evaluation and management, reassessments, review and interpretation of labs and x-rays, ventilator and hemodynamic management, formulating a plan and coordinating care: ___105____ MIN.  Time does not include procedural time.         CTICU ATTENDING     					  Chapis Edouard MD CTICU  CRITICAL  CARE  attending     Hand off received 					   Pertinent clinical, laboratory, radiographic, hemodynamic, echocardiographic, respiratory data, microbiologic data and chart were reviewed and analyzed frequently throughout the course of the day  Patient seen and examined with CTS/ SH attending at bedside  Pt is a 58yr old male with PMH HTN, HLD, DM, CAD, MVP sp MV repair (28mm Jaymie ring [facility] [place], [place]) admitted for severe MS. Pt underwent robotic mitral valve replacement (25mm Mosaic Bio Valve, EF 45%, CBP 133min) with Dr. Hernandez  10/23/24. Arrived extubated on levo. pRBC given. Given midodrine. Remains on levo overnight.       FAMILY HISTORY:  PAST MEDICAL & SURGICAL HISTORY:  DM2 (diabetes mellitus, type 2)  HTN (hypertension)  HLD (hyperlipidemia)  Severe mitral valve stenosis  CAD (coronary artery disease)  S/P MVR (mitral valve repair)        Patient is a 58y old  Male who presents with a chief complaint of Mitral Valve Stenosis.      14 system review was unremarkable    Vital signs, hemodynamic and respiratory parameters were reviewed from the bedside nursing flowsheet.  ICU Vital Signs Last 24 Hrs  T(C): 36.7 (24 Oct 2024 09:13), Max: 37.2 (24 Oct 2024 01:01)  T(F): 98.1 (24 Oct 2024 09:13), Max: 99 (24 Oct 2024 01:01)  HR: 64 (24 Oct 2024 10:00) (59 - 82)  BP: 105/53 (24 Oct 2024 06:00) (105/53 - 137/78)  BP(mean): 74 (24 Oct 2024 06:00) (74 - 102)  ABP: 99/38 (24 Oct 2024 10:00) (88/40 - 151/70)  ABP(mean): 55 (24 Oct 2024 10:00) (54 - 101)  RR: 19 (24 Oct 2024 10:00) (11 - 22)  SpO2: 95% (24 Oct 2024 10:00) (93% - 100%)    O2 Parameters below as of 24 Oct 2024 10:00  Patient On (Oxygen Delivery Method): nasal cannula, high flow  O2 Flow (L/min): 40  O2 Concentration (%): 20      Adult Advanced Hemodynamics Last 24 Hrs  CVP(mm Hg): -1 (24 Oct 2024 10:00) (-2 - 15)  CVP(cm H2O): --  CO: --  CI: --  PA: --  PA(mean): --  PCWP: --  SVR: --  SVRI: --  PVR: --  PVRI: --, ABG - ( 24 Oct 2024 02:25 )  pH, Arterial: 7.35  pH, Blood: x     /  pCO2: 37    /  pO2: 82    / HCO3: 20    / Base Excess: -4.7  /  SaO2: 97.6                Intake and output was reviewed and the fluid balance was calculated  Daily     Daily   I&O's Summary    23 Oct 2024 07:01  -  24 Oct 2024 07:00  --------------------------------------------------------  IN: 2847.2 mL / OUT: 2935 mL / NET: -87.8 mL    24 Oct 2024 07:01  -  24 Oct 2024 10:45  --------------------------------------------------------  IN: 39.3 mL / OUT: 405 mL / NET: -365.7 mL        All lines and drain sites were assessed  Glycemic trend was reviewedCAPILLARY BLOOD GLUCOSE      POCT Blood Glucose.: 149 mg/dL (24 Oct 2024 07:13)      Neuro:  HEENT:  Heart:  Lungs:  Abdomen:  Extremities:    Lines:    Tubes:      labs  CBC Full  -  ( 24 Oct 2024 02:14 )  WBC Count : 8.26 K/uL  RBC Count : 3.41 M/uL  Hemoglobin : 10.5 g/dL  Hematocrit : 32.2 %  Platelet Count - Automated : 131 K/uL  Mean Cell Volume : 94.4 fl  Mean Cell Hemoglobin : 30.8 pg  Mean Cell Hemoglobin Concentration : 32.6 gm/dL  Auto Neutrophil # : 5.89 K/uL  Auto Lymphocyte # : 1.27 K/uL  Auto Monocyte # : 1.06 K/uL  Auto Eosinophil # : 0.00 K/uL  Auto Basophil # : 0.02 K/uL  Auto Neutrophil % : 71.4 %  Auto Lymphocyte % : 15.4 %  Auto Monocyte % : 12.8 %  Auto Eosinophil % : 0.0 %  Auto Basophil % : 0.2 %    10-24    136  |  103  |  11  ----------------------------<  145[H]  4.1   |  22  |  0.89    Ca    8.3[L]      24 Oct 2024 02:14  Phos  3.8     10-24  Mg     1.9     10-24    TPro  6.0  /  Alb  3.6  /  TBili  0.7  /  DBili  x   /  AST  51[H]  /  ALT  13  /  AlkPhos  37[L]  10-24    PT/INR - ( 24 Oct 2024 02:14 )   PT: 12.8 sec;   INR: 1.10          PTT - ( 24 Oct 2024 02:14 )  PTT:27.5 sec  The current medications were reviewed   MEDICATIONS  (STANDING):  ascorbic acid 500 milliGRAM(s) Oral two times a day  aspirin enteric coated 81 milliGRAM(s) Oral daily  atorvastatin 10 milliGRAM(s) Oral at bedtime  ceFAZolin   IVPB 2000 milliGRAM(s) IV Intermittent every 8 hours  chlorhexidine 2% Cloths 1 Application(s) Topical daily  dextrose 5%. 1000 milliLiter(s) (100 mL/Hr) IV Continuous <Continuous>  dextrose 5%. 1000 milliLiter(s) (50 mL/Hr) IV Continuous <Continuous>  dextrose 50% Injectable 50 milliLiter(s) IV Push every 15 minutes  dextrose 50% Injectable 25 milliLiter(s) IV Push every 15 minutes  gabapentin 100 milliGRAM(s) Oral every 8 hours  glucagon  Injectable 1 milliGRAM(s) IntraMuscular once  heparin   Injectable 5000 Unit(s) SubCutaneous every 8 hours  insulin lispro (ADMELOG) corrective regimen sliding scale   SubCutaneous Before meals and at bedtime  midodrine 10 milliGRAM(s) Oral every 8 hours  mupirocin 2% Ointment 1 Application(s) Both Nostrils two times a day  norepinephrine Infusion 0.05 MICROgram(s)/kG/Min (5.82 mL/Hr) IV Continuous <Continuous>  pantoprazole    Tablet 40 milliGRAM(s) Oral daily  polyethylene glycol 3350 17 Gram(s) Oral daily  polyethylene glycol 3350 17 Gram(s) Oral daily  senna 2 Tablet(s) Oral at bedtime  sodium chloride 0.9%. 1000 milliLiter(s) (10 mL/Hr) IV Continuous <Continuous>    MEDICATIONS  (PRN):  dextrose Oral Gel 15 Gram(s) Oral once PRN Blood Glucose LESS THAN 70 milliGRAM(s)/deciliter  oxyCODONE    IR 5 milliGRAM(s) Oral every 6 hours PRN Moderate Pain (4 - 6)      Assessment/Plan:  58yr old male with PMH HTN, HLD, DM, CAD, MVP sp MV repair (28mm Jaymie ring 2011, [place]) admitted for severe MS. Pt underwent robotic mitral valve replacement (25mm Mosaic Bio Valve, EF 45%, CBP 133min) with Dr. Hernandez  10/23/24. Arrived extubated on levo. pRBC given. Given midodrine. Remains on levo overnight.          s/p cardiac surgery    Acute systolic and diastolic heart failure evidenced by SOB and parenchymal infiltrates; will treat with diuresis    Cardiogenic shock on ionotropy    Vasogenic shock due to hypotension in the cticu , will keep on pressors    Hypovolemic shock - > 20% intravascular depletion will replete volume    Acute blood loss anemia with relative hypotension treated with > 1 unit PC    Acute respiratory failure ruled in due to hypoxemia, O2 sats < 91% on RA treated with HFNC    Acute respiratory failure ruled in due to hypercapnea on abg will treat with mechanical ventilation    Acute respiratory failure ruled in due to prolonged mechanical ventilation > 24 hrs on the vent due to failure to wean due to weak respiratory mechanics    Toxic metabolic encephalopathy ; sundowning due to anesthesia pain medications    Acidosis evidenced by anion gap and negative base excess    Acute kidney injury - creatinine > 0.3 due to combined prerenal and intrarenal factors can presume ATN    ESRD    Acute shellie-operative ischemic stroke    Moderate protein calorie malutrition    Diet as tolerated  Replete lytes prn  Monitor CT output  GI/DVT PPX  Bowel Regimen  Pain control  OOB with PT    Titrate pressor support to maintain MAP >70  Titrate inotrope support to maintain CI >2.2/MVO2 >60  Close hemodynamic, ventilatory and drain monitoring and management per post op routine  Monitor for arrhythmias and monitor parameters for organ perfusion  Beta blockade not administered due to hemodynamic instability and bradycardia  Monitor neurologic status  Head of the bed should remain elevated to 45 deg   Chest PT and IS will be encouraged  Monitor adequacy of oxygenation and ventilation and attempt to wean oxygen  Antibiotic regimen will be tailored to the clinical, laboratory and microbiologic data  Nutritional goals will be met using po eventually, ensure adequate caloric intake and monitor the same  Stress ulcer and VTE prophylaxis will be achieved    Glycemic control is satisfactory  Electrolytes have been repleted as necessary and wound care has been carried out   Pain control has been achieved.   Aggressive physical therapy and early mobility and ambulation goals will be met   The family was updated about the course and plan.    CRITICAL CARE TIME personally provided by me  in evaluation and management, reassessments, review and interpretation of labs and x-rays, ventilator and hemodynamic management, formulating a plan and coordinating care: ___105____ MIN.  Time does not include procedural time.         CTICU ATTENDING     					  Chapis Edouard MD CTICU  CRITICAL  CARE  attending     Hand off received 					   Pertinent clinical, laboratory, radiographic, hemodynamic, echocardiographic, respiratory data, microbiologic data and chart were reviewed and analyzed frequently throughout the course of the day  Patient seen and examined with CTS/ SH attending at bedside  Pt is a 58yr old male with PMH HTN, HLD, DM, CAD, MVP sp MV repair (28mm Jaymie ring [facility], [place]) admitted for severe MS. Pt underwent robotic mitral valve replacement (25mm Mosaic Bio Valve, EF 45%, CBP 133min) with Dr. Hernandez  10/23/24. Arrived extubated on levo. pRBC given. Given midodrine. Remains on levo overnight.       FAMILY HISTORY:  PAST MEDICAL & SURGICAL HISTORY:  DM2 (diabetes mellitus, type 2)  HTN (hypertension)  HLD (hyperlipidemia)  Severe mitral valve stenosis  CAD (coronary artery disease)  S/P MVR (mitral valve repair)        Patient is a 58y old  Male who presents with a chief complaint of Mitral Valve Stenosis.      14 system review was unremarkable    Vital signs, hemodynamic and respiratory parameters were reviewed from the bedside nursing flowsheet.  ICU Vital Signs Last 24 Hrs  T(C): 36.7 (24 Oct 2024 09:13), Max: 37.2 (24 Oct 2024 01:01)  T(F): 98.1 (24 Oct 2024 09:13), Max: 99 (24 Oct 2024 01:01)  HR: 64 (24 Oct 2024 10:00) (59 - 82)  BP: 105/53 (24 Oct 2024 06:00) (105/53 - 137/78)  BP(mean): 74 (24 Oct 2024 06:00) (74 - 102)  ABP: 99/38 (24 Oct 2024 10:00) (88/40 - 151/70)  ABP(mean): 55 (24 Oct 2024 10:00) (54 - 101)  RR: 19 (24 Oct 2024 10:00) (11 - 22)  SpO2: 95% (24 Oct 2024 10:00) (93% - 100%)    O2 Parameters below as of 24 Oct 2024 10:00  Patient On (Oxygen Delivery Method): nasal cannula, high flow  O2 Flow (L/min): 40  O2 Concentration (%): 20      Adult Advanced Hemodynamics Last 24 Hrs  CVP(mm Hg): -1 (24 Oct 2024 10:00) (-2 - 15)  CVP(cm H2O): --  CO: --  CI: --  PA: --  PA(mean): --  PCWP: --  SVR: --  SVRI: --  PVR: --  PVRI: --, ABG - ( 24 Oct 2024 02:25 )  pH, Arterial: 7.35  pH, Blood: x     /  pCO2: 37    /  pO2: 82    / HCO3: 20    / Base Excess: -4.7  /  SaO2: 97.6                Intake and output was reviewed and the fluid balance was calculated  Daily     Daily   I&O's Summary    23 Oct 2024 07:01  -  24 Oct 2024 07:00  --------------------------------------------------------  IN: 2847.2 mL / OUT: 2935 mL / NET: -87.8 mL    24 Oct 2024 07:01  -  24 Oct 2024 10:45  --------------------------------------------------------  IN: 39.3 mL / OUT: 405 mL / NET: -365.7 mL        All lines and drain sites were assessed  Glycemic trend was reviewedCAPILLARY BLOOD GLUCOSE      POCT Blood Glucose.: 149 mg/dL (24 Oct 2024 07:13)      Neuro: well appearing, nad  HEENT: mmm  Heart: s1 s2  Lungs: clear bl  Abdomen: soft nt nd  Extremities: wwp    Lines:  RIJ Cordis with TVP 10/23  L radial arterial line 10/23    Tubes:  R pleural    labs  CBC Full  -  ( 24 Oct 2024 02:14 )  WBC Count : 8.26 K/uL  RBC Count : 3.41 M/uL  Hemoglobin : 10.5 g/dL  Hematocrit : 32.2 %  Platelet Count - Automated : 131 K/uL  Mean Cell Volume : 94.4 fl  Mean Cell Hemoglobin : 30.8 pg  Mean Cell Hemoglobin Concentration : 32.6 gm/dL  Auto Neutrophil # : 5.89 K/uL  Auto Lymphocyte # : 1.27 K/uL  Auto Monocyte # : 1.06 K/uL  Auto Eosinophil # : 0.00 K/uL  Auto Basophil # : 0.02 K/uL  Auto Neutrophil % : 71.4 %  Auto Lymphocyte % : 15.4 %  Auto Monocyte % : 12.8 %  Auto Eosinophil % : 0.0 %  Auto Basophil % : 0.2 %    10-24    136  |  103  |  11  ----------------------------<  145[H]  4.1   |  22  |  0.89    Ca    8.3[L]      24 Oct 2024 02:14  Phos  3.8     10-24  Mg     1.9     10-24    TPro  6.0  /  Alb  3.6  /  TBili  0.7  /  DBili  x   /  AST  51[H]  /  ALT  13  /  AlkPhos  37[L]  10-24    PT/INR - ( 24 Oct 2024 02:14 )   PT: 12.8 sec;   INR: 1.10          PTT - ( 24 Oct 2024 02:14 )  PTT:27.5 sec  The current medications were reviewed   MEDICATIONS  (STANDING):  ascorbic acid 500 milliGRAM(s) Oral two times a day  aspirin enteric coated 81 milliGRAM(s) Oral daily  atorvastatin 10 milliGRAM(s) Oral at bedtime  ceFAZolin   IVPB 2000 milliGRAM(s) IV Intermittent every 8 hours  chlorhexidine 2% Cloths 1 Application(s) Topical daily  dextrose 5%. 1000 milliLiter(s) (100 mL/Hr) IV Continuous <Continuous>  dextrose 5%. 1000 milliLiter(s) (50 mL/Hr) IV Continuous <Continuous>  dextrose 50% Injectable 50 milliLiter(s) IV Push every 15 minutes  dextrose 50% Injectable 25 milliLiter(s) IV Push every 15 minutes  gabapentin 100 milliGRAM(s) Oral every 8 hours  glucagon  Injectable 1 milliGRAM(s) IntraMuscular once  heparin   Injectable 5000 Unit(s) SubCutaneous every 8 hours  insulin lispro (ADMELOG) corrective regimen sliding scale   SubCutaneous Before meals and at bedtime  midodrine 10 milliGRAM(s) Oral every 8 hours  mupirocin 2% Ointment 1 Application(s) Both Nostrils two times a day  norepinephrine Infusion 0.05 MICROgram(s)/kG/Min (5.82 mL/Hr) IV Continuous <Continuous>  pantoprazole    Tablet 40 milliGRAM(s) Oral daily  polyethylene glycol 3350 17 Gram(s) Oral daily  polyethylene glycol 3350 17 Gram(s) Oral daily  senna 2 Tablet(s) Oral at bedtime  sodium chloride 0.9%. 1000 milliLiter(s) (10 mL/Hr) IV Continuous <Continuous>    MEDICATIONS  (PRN):  dextrose Oral Gel 15 Gram(s) Oral once PRN Blood Glucose LESS THAN 70 milliGRAM(s)/deciliter  oxyCODONE    IR 5 milliGRAM(s) Oral every 6 hours PRN Moderate Pain (4 - 6)      Assessment/Plan:  58yr old male with PMH HTN, HLD, DM, CAD, MVP sp MV repair (28mm Jaymie ring 2011, [place]) admitted for severe MS.     POD1 robotic mitral valve replacement (25mm Mosaic Bio Valve, EF 45%, CBP 133min, [facility], 10/23/24)  Vasogenic shock on levophed-titrate to MAP 65  Acute post operative anemia due to acute blood loss-trend H/H  Thrombocytopenia-monitor  Aspirin  Statin  Periop abx  Diet as tolerated  Replete lytes prn  Monitor CT output  GI/DVT PPX  Bowel Regimen  Pain control  OOB with PT  Close hemodynamic, ventilatory and drain monitoring and management per post op routine  Monitor for arrhythmias and monitor parameters for organ perfusion  Beta blockade not administered due to hemodynamic instability and bradycardia  Monitor neurologic status  Head of the bed should remain elevated to 45 deg   Chest PT and IS will be encouraged  Monitor adequacy of oxygenation and ventilation and attempt to wean oxygen  Antibiotic regimen will be tailored to the clinical, laboratory and microbiologic data  Nutritional goals will be met using po eventually, ensure adequate caloric intake and monitor the same  Stress ulcer and VTE prophylaxis will be achieved    Glycemic control is satisfactory  Electrolytes have been repleted as necessary and wound care has been carried out   Pain control has been achieved.   Aggressive physical therapy and early mobility and ambulation goals will be met   The family was updated about the course and plan.    CRITICAL CARE TIME personally provided by me  in evaluation and management, reassessments, review and interpretation of labs and x-rays, ventilator and hemodynamic management, formulating a plan and coordinating care: ___105____ MIN.  Time does not include procedural time.         CTICU ATTENDING     					  Chapis Edouard MD

## 2024-10-24 NOTE — PHYSICAL THERAPY INITIAL EVALUATION ADULT - GENERAL OBSERVATIONS, REHAB EVAL
patient received seated out of bed with no acute distress. +EKG +irene +heplock +central line +chest tube to self suction +collier +NC 6L/min

## 2024-10-24 NOTE — CONSULT NOTE ADULT - SUBJECTIVE AND OBJECTIVE BOX
HPI:  59 yo male with PMHx HTN, HLD, DM-2, mild CAD and mitral valve prolapse s/p MV repair w/28mm Jaymie ring in 2011 @ [facility] presents with severe mitral valve stenosis. Patient presented to his cardiologist, Dr. Vazquez.  7/21/24 TTE: LVEF 64%; s/p MV Ring with Severe MS. 8/11/24 Stress echo negative for ischemia. 9/25/24 Cardiac cath revealed non-obstructive CAD. 9/25/24 MARLEEN revealed severe mitral stenosis, mild MR, normal LV function. Pt underwent robotic mitral valve replacement (25mm Mosaic Bio Valve, EF 45%, CBP 133min) with Dr. Hernandez  10/23/24. POD#1   Pt seen OOBTC, tolerated diet, on NC2L, and CT x1 Incisional pain ~ 7/10 slightly better after given pain meds    PAST MEDICAL & SURGICAL HISTORY:  DM2 (diabetes mellitus, type 2)      HTN (hypertension)      HLD (hyperlipidemia)      Severe mitral valve stenosis      CAD (coronary artery disease)      S/P MVR (mitral valve repair)        Home Medications:  Aspirin EC 81 mg oral delayed release tablet: 1 tab(s) orally once a day (22 Oct 2024 09:22)  Januvia 25 mg oral tablet: 1 tab(s) orally once a day (22 Oct 2024 09:22)  metFORMIN 500 mg oral tablet: 1 tab(s) orally 2 times a day (22 Oct 2024 09:22)  metoprolol tartrate 25 mg oral tablet: 1 tab(s) orally 2 times a day (23 Oct 2024 06:01)  simvastatin 20 mg oral tablet: 1 tab(s) orally once a day (at bedtime) (22 Oct 2024 09:22)  tamsulosin 0.4 mg oral capsule: 1 cap(s) orally once a day (at bedtime) (22 Oct 2024 09:22)    Allergies    latex (Other; Rash)  No Known Drug Allergies  adhesives (Blisters)    Intolerances      FAMILY HISTORY:    Social History:  Never a smoker (16 Oct 2024 07:46)      REVIEW OF SYSTEMS:  CONSTITUTIONAL: No fever, weight loss  EYES: No eye pain, or discharge  ENMT:  No tinnitus, vertigo  NECK: No pain or stiffness  RESPIRATORY: No cough, No dyspnea  CARDIOVASCULAR: No chest pain, or leg swelling  GASTROINTESTINAL: No abdominal pain. No diarrhea ;No melena or hematochezia.  GENITOURINARY: No dysuria, frequency, or hematuria  NEUROLOGICAL: No numbness, or tremors  SKIN: No itching, burning, rashes, or lesions   ENDOCRINE: No heat or cold intolerance;  MUSCULOSKELETAL: No joint pain or swelling;   PSYCHIATRIC: No mood swings, or difficulty sleeping  HEME/LYMPH: No easy bruising, or bleeding gums  ALLERGY AND IMMUNOLOGIC: No hives or eczema    Diet, Consistent Carbohydrate/No Snacks:   DASH/TLC Sodium & Cholesterol Restricted (DASH) (10-23-24 @ 14:46) [Active]      CURRENT MEDICATIONS:   ascorbic acid 500 milliGRAM(s) Oral two times a day  aspirin enteric coated 81 milliGRAM(s) Oral daily  atorvastatin 10 milliGRAM(s) Oral at bedtime  ceFAZolin   IVPB 2000 milliGRAM(s) IV Intermittent every 8 hours  chlorhexidine 2% Cloths 1 Application(s) Topical daily  dextrose 5%. 1000 milliLiter(s) IV Continuous <Continuous>  dextrose 5%. 1000 milliLiter(s) IV Continuous <Continuous>  dextrose 50% Injectable 25 milliLiter(s) IV Push every 15 minutes  dextrose 50% Injectable 50 milliLiter(s) IV Push every 15 minutes  dextrose Oral Gel 15 Gram(s) Oral once PRN  gabapentin 100 milliGRAM(s) Oral every 8 hours  glucagon  Injectable 1 milliGRAM(s) IntraMuscular once  heparin   Injectable 5000 Unit(s) SubCutaneous every 8 hours  insulin lispro (ADMELOG) corrective regimen sliding scale   SubCutaneous Before meals and at bedtime  midodrine 10 milliGRAM(s) Oral every 8 hours  mupirocin 2% Ointment 1 Application(s) Both Nostrils two times a day  norepinephrine Infusion 0.05 MICROgram(s)/kG/Min IV Continuous <Continuous>  oxyCODONE    IR 5 milliGRAM(s) Oral every 6 hours PRN  pantoprazole    Tablet 40 milliGRAM(s) Oral daily  polyethylene glycol 3350 17 Gram(s) Oral daily  polyethylene glycol 3350 17 Gram(s) Oral daily  senna 2 Tablet(s) Oral at bedtime  sodium chloride 0.9%. 1000 milliLiter(s) IV Continuous <Continuous>      VITAL SIGNS, INS/OUTS (last 24 hours):  Vital Signs Last 24 Hrs  T(C): 36.1 (24 Oct 2024 17:00), Max: 37.2 (24 Oct 2024 01:01)  T(F): 97 (24 Oct 2024 17:00), Max: 99 (24 Oct 2024 01:01)  HR: 75 (24 Oct 2024 17:00) (64 - 82)  BP: 105/53 (24 Oct 2024 06:00) (105/53 - 105/53)  BP(mean): 74 (24 Oct 2024 06:00) (74 - 74)  RR: 13 (24 Oct 2024 17:00) (13 - 25)  SpO2: 97% (24 Oct 2024 17:00) (93% - 100%)    Parameters below as of 24 Oct 2024 17:00  Patient On (Oxygen Delivery Method): room air      I&O's Summary    23 Oct 2024 07:01  -  24 Oct 2024 07:00  --------------------------------------------------------  IN: 2847.2 mL / OUT: 2935 mL / NET: -87.8 mL    24 Oct 2024 07:01  -  24 Oct 2024 17:19  --------------------------------------------------------  IN: 1287.6 mL / OUT: 870 mL / NET: 417.6 mL        PHYSICAL EXAM:  Gen: Reclining in bed at time of exam, appears stated age  HEENT: NCAT, MMM, clear OP  Neck: supple, trachea at midline  CV: RRR, +S1/S2  Pulm: adequate respiratory effort, no increase in work of breathing  Abd: soft, NTND  Skin: warm and dry,  Ext: WWP, no LE edema  Neuro: AOx3, no gross focal neurological deficits  Psych: affect and behavior appropriate, pleasant at time of interview    BASIC LABS:                        10.3   10.75 )-----------( 113      ( 24 Oct 2024 11:11 )             30.7     10-24    136  |  101  |  9   ----------------------------<  173[H]  3.7   |  21[L]  |  0.80    Ca    8.3[L]      24 Oct 2024 11:11  Phos  2.7     10-24  Mg     1.8     10-24    TPro  6.5  /  Alb  4.3  /  TBili  0.7  /  DBili  x   /  AST  43[H]  /  ALT  11  /  AlkPhos  39[L]  10-24    PT/INR - ( 24 Oct 2024 11:11 )   PT: 14.8 sec;   INR: 1.29          PTT - ( 24 Oct 2024 11:11 )  PTT:29.5 sec  Urinalysis Basic - ( 24 Oct 2024 11:11 )    Color: x / Appearance: x / SG: x / pH: x  Gluc: 173 mg/dL / Ketone: x  / Bili: x / Urobili: x   Blood: x / Protein: x / Nitrite: x   Leuk Esterase: x / RBC: x / WBC x   Sq Epi: x / Non Sq Epi: x / Bacteria: x      CAPILLARY BLOOD GLUCOSE      POCT Blood Glucose.: 216 mg/dL (24 Oct 2024 16:55)  POCT Blood Glucose.: 158 mg/dL (24 Oct 2024 11:14)  POCT Blood Glucose.: 149 mg/dL (24 Oct 2024 07:13)  POCT Blood Glucose.: 147 mg/dL (23 Oct 2024 22:24)      OTHER LABS:        MICRODATA:      IMAGING:    EKG:    #Diet - Diet, Consistent Carbohydrate/No Snacks:   DASH/TLC Sodium & Cholesterol Restricted (DASH) (10-23-24 @ 14:46) [Active]        #DVT PPx -

## 2024-10-24 NOTE — PHYSICAL THERAPY INITIAL EVALUATION ADULT - PERTINENT HX OF CURRENT PROBLEM, REHAB EVAL
58 year old male presents with severe mitral valve stenosis. Patient presented to his cardiologist, Dr. Vazquez, with c/o intermittent substernal chest pressure, non-radiating, occurring upon moderate physical exertion, which improves w/ rest, that has been gradually worsening over the past few weeks. Pt also reported SOB/ELLIOTT with light exertion, and dizziness/lightheadedness. NYHA 3. Pt denies palpitations, diaphoresis, orthopnea/PND, cough, leg swelling, LOC, fall, syncope, N/V/D, fever/chills/sick contact, rash, hematuria, BRBPR, melena/hematochezia. 7/21/24 TTE: LVEF 64%; s/p MV Ring with Severe MS. 8/11/24 Stress echo negative for ischemia. 9/25/24 Cardiac cath revealed non-obstructive CAD. 9/25/24 MARLEEN revealed severe mitral stenosis, mild MR, normal LV function.  ?  Patient was seen in the outpatient office for surgical consult with Dr. Hernandez.

## 2024-10-24 NOTE — PROGRESS NOTE ADULT - SUBJECTIVE AND OBJECTIVE BOX
CTICU  CRITICAL  CARE  attending     Hand off received 					   Pertinent clinical, laboratory, radiographic, hemodynamic, echocardiographic, respiratory data, microbiologic data and chart were reviewed and analyzed frequently throughout the course of the day and night      58 year old male with HTN, HLD, DM-2, mild CAD and mitral valve prolapse s/p MV repair w/28mm Jaymie ring in 2011 @ [facility] presents with severe mitral valve stenosis.   He was evaluated by his cardiologist, Dr. Vazquez, for substernal chest pressure and dyspnea on exertion.  TTE: LVEF 64%; s/p MV Ring with Severe MS. 8/11/24 Stress echo negative for ischemia.   Cardiac cath revealed non-obstructive CAD.   MARLEEN revealed severe mitral stenosis, mild MR, normal LV function.    S/P MVR        FAMILY HISTORY:  PAST MEDICAL & SURGICAL HISTORY:  DM2 (diabetes mellitus, type 2)  HTN (hypertension)  HLD (hyperlipidemia)  Severe mitral valve stenosis  CAD (coronary artery disease)  S/P MVR (mitral valve repair)          14 system review was unremarkable    Vital signs, hemodynamic and respiratory parameters were reviewed from the bedside nursing flow sheet.  ICU Vital Signs Last 24 Hrs  T(C): 36.1 (24 Oct 2024 17:00), Max: 37.2 (24 Oct 2024 01:01)  T(F): 97 (24 Oct 2024 17:00), Max: 99 (24 Oct 2024 01:01)  HR: 86 (24 Oct 2024 20:00) (64 - 86)  BP: 105/53 (24 Oct 2024 06:00) (105/53 - 105/53)  BP(mean): 74 (24 Oct 2024 06:00) (74 - 74)  ABP: 147/51 (24 Oct 2024 20:00) (94/39 - 147/51)  ABP(mean): 76 (24 Oct 2024 20:00) (55 - 76)  RR: 14 (24 Oct 2024 20:00) (12 - 25)  SpO2: 94% (24 Oct 2024 20:00) (93% - 100%)    O2 Parameters below as of 24 Oct 2024 20:00  Patient On (Oxygen Delivery Method): nasal cannula w/ humidification  O2 Flow (L/min): 3        Adult Advanced Hemodynamics Last 24 Hrs  CVP(mm Hg): 31 (24 Oct 2024 20:00) (-2 - 31)  CVP(cm H2O): --  CO: --  CI: --  PA: --  PA(mean): --  PCWP: --  SVR: --  SVRI: --  PVR: --  PVRI: --, ABG - ( 24 Oct 2024 17:25 )  pH, Arterial: 7.41  pH, Blood: x     /  pCO2: 35    /  pO2: 88    / HCO3: 22    / Base Excess: -1.9  /  SaO2: 98.2                Intake and output was reviewed and the fluid balance was calculated  Daily     Daily   I&O's Summary    23 Oct 2024 07:01  -  24 Oct 2024 07:00  --------------------------------------------------------  IN: 2847.2 mL / OUT: 2935 mL / NET: -87.8 mL    24 Oct 2024 07:01  -  24 Oct 2024 21:12  --------------------------------------------------------  IN: 2168.8 mL / OUT: 1030 mL / NET: 1138.8 mL            Neuro: No change in the mental status from the baseline.   Neck: No JVD.  CVS: S1, S2, No S3.  Lungs: Good air entry bilaterally.  Abd: Soft. No tenderness. + Bowel sounds.  Vascular: + DP/PT.  Extremities: No edema.  Lymphatic: Normal.  Skin: No abnormalities.      labs  CBC Full  -  ( 24 Oct 2024 17:27 )  WBC Count : 10.60 K/uL  RBC Count : 3.19 M/uL  Hemoglobin : 10.5 g/dL  Hematocrit : 30.2 %  Platelet Count - Automated : 101 K/uL  Mean Cell Volume : 94.7 fl  Mean Cell Hemoglobin : 32.9 pg  Mean Cell Hemoglobin Concentration : 34.8 gm/dL  Auto Neutrophil # : 7.94 K/uL  Auto Lymphocyte # : 1.34 K/uL  Auto Monocyte # : 1.23 K/uL  Auto Eosinophil # : 0.02 K/uL  Auto Basophil # : 0.04 K/uL  Auto Neutrophil % : 74.9 %  Auto Lymphocyte % : 12.6 %  Auto Monocyte % : 11.6 %  Auto Eosinophil % : 0.2 %  Auto Basophil % : 0.4 %    10-24    133[L]  |  100  |  11  ----------------------------<  193[H]  4.1   |  22  |  0.69    Ca    8.2[L]      24 Oct 2024 17:27  Phos  2.0     10-24  Mg     2.7     10-24    TPro  6.3  /  Alb  4.1  /  TBili  0.4  /  DBili  x   /  AST  38  /  ALT  9[L]  /  AlkPhos  39[L]  10-24    PT/INR - ( 24 Oct 2024 17:27 )   PT: 13.3 sec;   INR: 1.16          PTT - ( 24 Oct 2024 17:27 )  PTT:29.2 sec  The current medications were reviewed   MEDICATIONS  (STANDING):  ascorbic acid 500 milliGRAM(s) Oral two times a day  aspirin enteric coated 81 milliGRAM(s) Oral daily  atorvastatin 10 milliGRAM(s) Oral at bedtime  ceFAZolin   IVPB 2000 milliGRAM(s) IV Intermittent every 8 hours  chlorhexidine 2% Cloths 1 Application(s) Topical daily  dextrose 5%. 1000 milliLiter(s) (100 mL/Hr) IV Continuous <Continuous>  dextrose 5%. 1000 milliLiter(s) (50 mL/Hr) IV Continuous <Continuous>  dextrose 50% Injectable 50 milliLiter(s) IV Push every 15 minutes  dextrose 50% Injectable 25 milliLiter(s) IV Push every 15 minutes  gabapentin 100 milliGRAM(s) Oral every 8 hours  glucagon  Injectable 1 milliGRAM(s) IntraMuscular once  heparin   Injectable 5000 Unit(s) SubCutaneous every 8 hours  insulin lispro (ADMELOG) corrective regimen sliding scale   SubCutaneous Before meals and at bedtime  midodrine 10 milliGRAM(s) Oral every 8 hours  mupirocin 2% Ointment 1 Application(s) Both Nostrils two times a day  norepinephrine Infusion 0.05 MICROgram(s)/kG/Min (5.82 mL/Hr) IV Continuous <Continuous>  pantoprazole    Tablet 40 milliGRAM(s) Oral daily  polyethylene glycol 3350 17 Gram(s) Oral daily  polyethylene glycol 3350 17 Gram(s) Oral daily  senna 2 Tablet(s) Oral at bedtime  sodium chloride 0.9%. 1000 milliLiter(s) (10 mL/Hr) IV Continuous <Continuous>    MEDICATIONS  (PRN):  acetaminophen     Tablet .. 650 milliGRAM(s) Oral every 6 hours PRN Mild Pain (1 - 3)  dextrose Oral Gel 15 Gram(s) Oral once PRN Blood Glucose LESS THAN 70 milliGRAM(s)/deciliter  oxyCODONE    IR 5 milliGRAM(s) Oral every 6 hours PRN Moderate Pain (4 - 6)          58 year old  Male admitted with Mitral Valve Stenosis   S/P MVR  Uncontrolled DM  Hemodynamically stable.  Good oxygenation.  Fair urine out put.            My plan includes :  OPTIMIZE Glycemic control.  Statin and Betablocker.  Gentle diuresis  Afterload reduction.  Close hemodynamic monitoring   Monitor for arrhythmias and monitor parameters for organ perfusion  Monitor neurologic status  Monitor renal function.  Head of the bed should remain elevated   Nutritional goals will be met using po eventually , ensure adequate caloric intake and monitor the same  Stress ulcer and VTE prophylaxis will be achieved.  Electrolytes have been repleted as necessary and wound care has been carried out. Pain control has been achieved.   Aggressive physical therapy and early mobility and ambulation goals will be met   The family was updated about the course and plan  CRITICAL CARE TIME SPENT in evaluation and management, reassessments, review and interpretation of labs and x-rays, ventilator and hemodynamic management, formulating a plan and coordinating care: ___30____ MIN.  Time does not include procedural time.  CTICU ATTENDING     					      Mauro Cunha MD

## 2024-10-24 NOTE — CONSULT NOTE ADULT - ASSESSMENT
59 yo male with PMHx HTN, HLD, DM-2, mild CAD and mitral valve prolapse s/p MV repair w/28mm Jaymie ring in 2011 @ [facility] presents with severe mitral valve stenosis. Patient presented to his cardiologist, Dr. Vazquez.  7/21/24 TTE: LVEF 64%; s/p MV Ring with Severe MS. 8/11/24 Stress echo negative for ischemia. 9/25/24 Cardiac cath revealed non-obstructive CAD. 9/25/24 MARLEEN revealed severe mitral stenosis, mild MR, normal LV function. Pt underwent robotic mitral valve replacement (25mm Mosaic Bio Valve, EF 45%, CBP 133min) with Dr. Hernandez  10/23/24. POD#1     - active care per [facility] team and Dr. Hernandez   - Pressor: norepinephrine Infusion 0.05 MICROgram(s)/kG/Min IV Continuous <Continuous>,midodrine 10 milliGRAM(s) Oral every 8 hours   keep MAP>65mmHg   - CT x1 /CXR reviewed   - incentive spirometer use/OOBTC/ambulation as tolerated   - postop abx ppx: ceFAZolin   IVPB 2000 milliGRAM(s) IV Intermittent every 8 hours x 3 doses per protocol                             mupirocin 2% Ointment 1 Application(s) Both Nostrils two times a day                             chlorhexidine 2% Cloths 1 Application(s) Topical daily  - pain control:  gabapentin 100 milliGRAM(s) Oral every 8 hours  oxyCODONE    IR 5 milliGRAM(s) Oral every 6 hours PRN  - bowel regimen:   polyethylene glycol 3350 17 Gram(s) Oral daily  senna 2 Tablet(s) Oral at bedtime  - CAD: aspirin enteric coated 81 milliGRAM(s) Oral daily  - HLD: atorvastatin 10 milliGRAM(s) Oral at bedtime    Contact:  [facility] cardiology Dr. Jagjit Vazquez   Brother-in-law: Dr. Chelsea Aguilar ( Pediatrician)

## 2024-10-25 LAB
ALBUMIN SERPL ELPH-MCNC: 3.5 G/DL — SIGNIFICANT CHANGE UP (ref 3.3–5)
ALP SERPL-CCNC: 39 U/L — LOW (ref 40–120)
ALT FLD-CCNC: 8 U/L — LOW (ref 10–45)
ANION GAP SERPL CALC-SCNC: 11 MMOL/L — SIGNIFICANT CHANGE UP (ref 5–17)
ANISOCYTOSIS BLD QL: SLIGHT — SIGNIFICANT CHANGE UP
APPEARANCE UR: CLEAR — SIGNIFICANT CHANGE UP
APTT BLD: 30.5 SEC — SIGNIFICANT CHANGE UP (ref 24.5–35.6)
AST SERPL-CCNC: 30 U/L — SIGNIFICANT CHANGE UP (ref 10–40)
BACTERIA # UR AUTO: NEGATIVE /HPF — SIGNIFICANT CHANGE UP
BASOPHILS # BLD AUTO: 0 K/UL — SIGNIFICANT CHANGE UP (ref 0–0.2)
BASOPHILS NFR BLD AUTO: 0 % — SIGNIFICANT CHANGE UP (ref 0–2)
BILIRUB SERPL-MCNC: 0.6 MG/DL — SIGNIFICANT CHANGE UP (ref 0.2–1.2)
BILIRUB UR-MCNC: NEGATIVE — SIGNIFICANT CHANGE UP
BUN SERPL-MCNC: 8 MG/DL — SIGNIFICANT CHANGE UP (ref 7–23)
BURR CELLS BLD QL SMEAR: PRESENT — SIGNIFICANT CHANGE UP
CALCIUM SERPL-MCNC: 8 MG/DL — LOW (ref 8.4–10.5)
CAST: 0 /LPF — SIGNIFICANT CHANGE UP (ref 0–4)
CHLORIDE SERPL-SCNC: 102 MMOL/L — SIGNIFICANT CHANGE UP (ref 96–108)
CO2 SERPL-SCNC: 21 MMOL/L — LOW (ref 22–31)
COLOR SPEC: YELLOW — SIGNIFICANT CHANGE UP
CREAT SERPL-MCNC: 0.72 MG/DL — SIGNIFICANT CHANGE UP (ref 0.5–1.3)
DIFF PNL FLD: ABNORMAL
EGFR: 107 ML/MIN/1.73M2 — SIGNIFICANT CHANGE UP
EOSINOPHIL # BLD AUTO: 0 K/UL — SIGNIFICANT CHANGE UP (ref 0–0.5)
EOSINOPHIL NFR BLD AUTO: 0 % — SIGNIFICANT CHANGE UP (ref 0–6)
GAS PNL BLDA: SIGNIFICANT CHANGE UP
GIANT PLATELETS BLD QL SMEAR: PRESENT — SIGNIFICANT CHANGE UP
GLUCOSE BLDC GLUCOMTR-MCNC: 166 MG/DL — HIGH (ref 70–99)
GLUCOSE BLDC GLUCOMTR-MCNC: 168 MG/DL — HIGH (ref 70–99)
GLUCOSE BLDC GLUCOMTR-MCNC: 196 MG/DL — HIGH (ref 70–99)
GLUCOSE BLDC GLUCOMTR-MCNC: 269 MG/DL — HIGH (ref 70–99)
GLUCOSE SERPL-MCNC: 156 MG/DL — HIGH (ref 70–99)
GLUCOSE UR QL: 250 MG/DL
HCT VFR BLD CALC: 29.5 % — LOW (ref 39–50)
HGB BLD-MCNC: 10 G/DL — LOW (ref 13–17)
INR BLD: 1.17 — HIGH (ref 0.85–1.16)
KETONES UR-MCNC: 80 MG/DL
LEUKOCYTE ESTERASE UR-ACNC: NEGATIVE — SIGNIFICANT CHANGE UP
LYMPHOCYTES # BLD AUTO: 0.84 K/UL — LOW (ref 1–3.3)
LYMPHOCYTES # BLD AUTO: 7.1 % — LOW (ref 13–44)
MACROCYTES BLD QL: SLIGHT — SIGNIFICANT CHANGE UP
MAGNESIUM SERPL-MCNC: 2.2 MG/DL — SIGNIFICANT CHANGE UP (ref 1.6–2.6)
MANUAL SMEAR VERIFICATION: SIGNIFICANT CHANGE UP
MCHC RBC-ENTMCNC: 32.2 PG — SIGNIFICANT CHANGE UP (ref 27–34)
MCHC RBC-ENTMCNC: 33.9 GM/DL — SIGNIFICANT CHANGE UP (ref 32–36)
MCV RBC AUTO: 94.9 FL — SIGNIFICANT CHANGE UP (ref 80–100)
MONOCYTES # BLD AUTO: 1.04 K/UL — HIGH (ref 0–0.9)
MONOCYTES NFR BLD AUTO: 8.8 % — SIGNIFICANT CHANGE UP (ref 2–14)
NEUTROPHILS # BLD AUTO: 9.95 K/UL — HIGH (ref 1.8–7.4)
NEUTROPHILS NFR BLD AUTO: 84.1 % — HIGH (ref 43–77)
NITRITE UR-MCNC: NEGATIVE — SIGNIFICANT CHANGE UP
OVALOCYTES BLD QL SMEAR: SLIGHT — SIGNIFICANT CHANGE UP
PH UR: 6 — SIGNIFICANT CHANGE UP (ref 5–8)
PHOSPHATE SERPL-MCNC: 1.1 MG/DL — LOW (ref 2.5–4.5)
PLAT MORPH BLD: ABNORMAL
PLATELET # BLD AUTO: 102 K/UL — LOW (ref 150–400)
POIKILOCYTOSIS BLD QL AUTO: SLIGHT — SIGNIFICANT CHANGE UP
POTASSIUM SERPL-MCNC: 4 MMOL/L — SIGNIFICANT CHANGE UP (ref 3.5–5.3)
POTASSIUM SERPL-SCNC: 4 MMOL/L — SIGNIFICANT CHANGE UP (ref 3.5–5.3)
PROT SERPL-MCNC: 6 G/DL — SIGNIFICANT CHANGE UP (ref 6–8.3)
PROT UR-MCNC: 100 MG/DL
PROTHROM AB SERPL-ACNC: 13.4 SEC — SIGNIFICANT CHANGE UP (ref 9.9–13.4)
RBC # BLD: 3.11 M/UL — LOW (ref 4.2–5.8)
RBC # FLD: 14.3 % — SIGNIFICANT CHANGE UP (ref 10.3–14.5)
RBC BLD AUTO: ABNORMAL
RBC CASTS # UR COMP ASSIST: 6 /HPF — HIGH (ref 0–4)
SMUDGE CELLS # BLD: PRESENT — SIGNIFICANT CHANGE UP
SODIUM SERPL-SCNC: 134 MMOL/L — LOW (ref 135–145)
SP GR SPEC: 1.02 — SIGNIFICANT CHANGE UP (ref 1–1.03)
SQUAMOUS # UR AUTO: 1 /HPF — SIGNIFICANT CHANGE UP (ref 0–5)
UROBILINOGEN FLD QL: 1 MG/DL — SIGNIFICANT CHANGE UP (ref 0.2–1)
WBC # BLD: 11.83 K/UL — HIGH (ref 3.8–10.5)
WBC # FLD AUTO: 11.83 K/UL — HIGH (ref 3.8–10.5)
WBC UR QL: 6 /HPF — HIGH (ref 0–5)

## 2024-10-25 PROCEDURE — 99232 SBSQ HOSP IP/OBS MODERATE 35: CPT

## 2024-10-25 PROCEDURE — 71045 X-RAY EXAM CHEST 1 VIEW: CPT | Mod: 26

## 2024-10-25 RX ORDER — TAMSULOSIN HCL 0.4 MG
0.4 CAPSULE ORAL AT BEDTIME
Refills: 0 | Status: DISCONTINUED | OUTPATIENT
Start: 2024-10-25 | End: 2024-10-29

## 2024-10-25 RX ORDER — SODIUM PHOSPHATE, MONOBASIC, MONOHYDRATE AND SODIUM PHOSPHATE, DIBASIC ANHYDROUS 276; 142 MG/ML; MG/ML
15 INJECTION, SOLUTION INTRAVENOUS ONCE
Refills: 0 | Status: COMPLETED | OUTPATIENT
Start: 2024-10-25 | End: 2024-10-25

## 2024-10-25 RX ORDER — POTASSIUM PHOSPHATE 236; 224 MG/ML; MG/ML
30 INJECTION, SOLUTION INTRAVENOUS ONCE
Refills: 0 | Status: DISCONTINUED | OUTPATIENT
Start: 2024-10-25 | End: 2024-10-25

## 2024-10-25 RX ORDER — ALBUMIN HUMAN 50 G/1000ML
50 SOLUTION INTRAVENOUS
Refills: 0 | Status: COMPLETED | OUTPATIENT
Start: 2024-10-25 | End: 2024-10-25

## 2024-10-25 RX ORDER — SODIUM CHLORIDE 9 MG/ML
3 INJECTION, SOLUTION INTRAMUSCULAR; INTRAVENOUS; SUBCUTANEOUS EVERY 8 HOURS
Refills: 0 | Status: DISCONTINUED | OUTPATIENT
Start: 2024-10-25 | End: 2024-10-29

## 2024-10-25 RX ADMIN — PANTOPRAZOLE SODIUM 40 MILLIGRAM(S): 40 TABLET, DELAYED RELEASE ORAL at 07:57

## 2024-10-25 RX ADMIN — Medication 650 MILLIGRAM(S): at 09:20

## 2024-10-25 RX ADMIN — GABAPENTIN 100 MILLIGRAM(S): 300 CAPSULE ORAL at 22:23

## 2024-10-25 RX ADMIN — Medication 10 MILLIGRAM(S): at 22:23

## 2024-10-25 RX ADMIN — SODIUM CHLORIDE 3 MILLILITER(S): 9 INJECTION, SOLUTION INTRAMUSCULAR; INTRAVENOUS; SUBCUTANEOUS at 13:36

## 2024-10-25 RX ADMIN — HEPARIN SODIUM 5000 UNIT(S): 10000 INJECTION INTRAVENOUS; SUBCUTANEOUS at 13:21

## 2024-10-25 RX ADMIN — ALBUMIN HUMAN 50 MILLILITER(S): 50 SOLUTION INTRAVENOUS at 06:00

## 2024-10-25 RX ADMIN — Medication 650 MILLIGRAM(S): at 08:32

## 2024-10-25 RX ADMIN — CHLORHEXIDINE GLUCONATE 1 APPLICATION(S): 40 SOLUTION TOPICAL at 07:56

## 2024-10-25 RX ADMIN — Medication 2: at 08:02

## 2024-10-25 RX ADMIN — ALBUMIN HUMAN 50 MILLILITER(S): 50 SOLUTION INTRAVENOUS at 07:30

## 2024-10-25 RX ADMIN — SODIUM PHOSPHATE, MONOBASIC, MONOHYDRATE AND SODIUM PHOSPHATE, DIBASIC ANHYDROUS 62.5 MILLIMOLE(S): 276; 142 INJECTION, SOLUTION INTRAVENOUS at 08:01

## 2024-10-25 RX ADMIN — GABAPENTIN 100 MILLIGRAM(S): 300 CAPSULE ORAL at 13:21

## 2024-10-25 RX ADMIN — Medication 500 MILLIGRAM(S): at 17:23

## 2024-10-25 RX ADMIN — Medication 500 MILLIGRAM(S): at 07:56

## 2024-10-25 RX ADMIN — Medication 6: at 11:38

## 2024-10-25 RX ADMIN — Medication 81 MILLIGRAM(S): at 11:07

## 2024-10-25 RX ADMIN — SODIUM CHLORIDE 3 MILLILITER(S): 9 INJECTION, SOLUTION INTRAMUSCULAR; INTRAVENOUS; SUBCUTANEOUS at 22:33

## 2024-10-25 RX ADMIN — MUPIROCIN 1 APPLICATION(S): 20 OINTMENT TOPICAL at 07:56

## 2024-10-25 RX ADMIN — OXYCODONE HYDROCHLORIDE 5 MILLIGRAM(S): 30 TABLET ORAL at 22:21

## 2024-10-25 RX ADMIN — HEPARIN SODIUM 5000 UNIT(S): 10000 INJECTION INTRAVENOUS; SUBCUTANEOUS at 07:56

## 2024-10-25 RX ADMIN — Medication 100 MILLIGRAM(S): at 03:38

## 2024-10-25 RX ADMIN — Medication 0.4 MILLIGRAM(S): at 22:22

## 2024-10-25 RX ADMIN — Medication 2: at 17:23

## 2024-10-25 RX ADMIN — MIDODRINE HYDROCHLORIDE 10 MILLIGRAM(S): 2.5 TABLET ORAL at 07:56

## 2024-10-25 RX ADMIN — Medication 650 MILLIGRAM(S): at 22:21

## 2024-10-25 RX ADMIN — GABAPENTIN 100 MILLIGRAM(S): 300 CAPSULE ORAL at 07:56

## 2024-10-25 RX ADMIN — Medication 2: at 22:25

## 2024-10-25 RX ADMIN — HEPARIN SODIUM 5000 UNIT(S): 10000 INJECTION INTRAVENOUS; SUBCUTANEOUS at 22:23

## 2024-10-25 NOTE — PROGRESS NOTE ADULT - SUBJECTIVE AND OBJECTIVE BOX
Patient is a 58y old  Male who presents with a chief complaint of Mitral Valve Stenosis (24 Oct 2024 21:12)    INTERVAL EVENTS: NAEON     SUBJECTIVE:  Patient was seen and examined at bedside this am. Wife at bedside. Pt feels better, pain improving ~ 6/10. Denies SOB, in good spirits.     Review of systems: No fever, chills, dizziness, HA, Changes in vision, CP, dyspnea, nausea or vomiting, dysuria, changes in bowel movements, LE edema. Rest of 12 point Review of systems negative unless otherwise documented elsewhere in note.     Diet, Consistent Carbohydrate/No Snacks:   DASH/TLC Sodium & Cholesterol Restricted (DASH) (10-23-24 @ 14:46) [Active]      MEDICATIONS:  MEDICATIONS  (STANDING):  ascorbic acid 500 milliGRAM(s) Oral two times a day  aspirin enteric coated 81 milliGRAM(s) Oral daily  atorvastatin 10 milliGRAM(s) Oral at bedtime  chlorhexidine 2% Cloths 1 Application(s) Topical daily  dextrose 5%. 1000 milliLiter(s) (100 mL/Hr) IV Continuous <Continuous>  dextrose 5%. 1000 milliLiter(s) (50 mL/Hr) IV Continuous <Continuous>  dextrose 50% Injectable 50 milliLiter(s) IV Push every 15 minutes  dextrose 50% Injectable 25 milliLiter(s) IV Push every 15 minutes  gabapentin 100 milliGRAM(s) Oral every 8 hours  glucagon  Injectable 1 milliGRAM(s) IntraMuscular once  heparin   Injectable 5000 Unit(s) SubCutaneous every 8 hours  insulin lispro (ADMELOG) corrective regimen sliding scale   SubCutaneous Before meals and at bedtime  midodrine 10 milliGRAM(s) Oral every 8 hours  mupirocin 2% Ointment 1 Application(s) Both Nostrils two times a day  pantoprazole    Tablet 40 milliGRAM(s) Oral daily  polyethylene glycol 3350 17 Gram(s) Oral daily  polyethylene glycol 3350 17 Gram(s) Oral daily  senna 2 Tablet(s) Oral at bedtime  sodium chloride 0.9% lock flush 3 milliLiter(s) IV Push every 8 hours  tamsulosin 0.4 milliGRAM(s) Oral at bedtime    MEDICATIONS  (PRN):  acetaminophen     Tablet .. 650 milliGRAM(s) Oral every 6 hours PRN Mild Pain (1 - 3)  dextrose Oral Gel 15 Gram(s) Oral once PRN Blood Glucose LESS THAN 70 milliGRAM(s)/deciliter  oxyCODONE    IR 5 milliGRAM(s) Oral every 6 hours PRN Moderate Pain (4 - 6)      Allergies    latex (Other; Rash)  No Known Drug Allergies  adhesives (Blisters)    Intolerances        OBJECTIVE:  Vital Signs Last 24 Hrs  T(C): 37.4 (25 Oct 2024 09:47), Max: 37.4 (25 Oct 2024 09:47)  T(F): 99.3 (25 Oct 2024 09:47), Max: 99.3 (25 Oct 2024 09:47)  HR: 74 (25 Oct 2024 11:00) (70 - 89)  BP: 103/58 (25 Oct 2024 11:00) (93/54 - 103/58)  BP(mean): 77 (25 Oct 2024 11:00) (68 - 77)  RR: 19 (25 Oct 2024 11:00) (12 - 25)  SpO2: 94% (25 Oct 2024 11:00) (94% - 100%)    Parameters below as of 25 Oct 2024 11:00  Patient On (Oxygen Delivery Method): room air      I&O's Summary    24 Oct 2024 07:01  -  25 Oct 2024 07:00  --------------------------------------------------------  IN: 2339.3 mL / OUT: 1790 mL / NET: 549.3 mL    25 Oct 2024 07:01  -  25 Oct 2024 13:53  --------------------------------------------------------  IN: 250 mL / OUT: 725 mL / NET: -475 mL        PHYSICAL EXAM:  Gen: Reclining in bed at time of exam, appears stated age  HEENT: NCAT, MMM, clear OP  Neck: supple, trachea at midline, R TLC in place and R CT x1   CV: RRR, +S1/S2  Pulm: adequate respiratory effort, no increase in work of breathing  Abd: soft, NTND  Skin: warm and dry,   Ext: WWP, no LE edema  Neuro: AOx3, no gross focal neurological deficits  Psych: affect and behavior appropriate, pleasant at time of interview  : collier in place     LABS:                        10.0   11.83 )-----------( 102      ( 25 Oct 2024 04:06 )             29.5     10-25    134[L]  |  102  |  8   ----------------------------<  156[H]  4.0   |  21[L]  |  0.72    Ca    8.0[L]      25 Oct 2024 04:06  Phos  1.1     10-25  Mg     2.2     10-25    TPro  6.0  /  Alb  3.5  /  TBili  0.6  /  DBili  x   /  AST  30  /  ALT  8[L]  /  AlkPhos  39[L]  10-25    LIVER FUNCTIONS - ( 25 Oct 2024 04:06 )  Alb: 3.5 g/dL / Pro: 6.0 g/dL / ALK PHOS: 39 U/L / ALT: 8 U/L / AST: 30 U/L / GGT: x           PT/INR - ( 25 Oct 2024 04:06 )   PT: 13.4 sec;   INR: 1.17          PTT - ( 25 Oct 2024 04:06 )  PTT:30.5 sec  CAPILLARY BLOOD GLUCOSE      POCT Blood Glucose.: 269 mg/dL (25 Oct 2024 11:17)  POCT Blood Glucose.: 166 mg/dL (25 Oct 2024 07:40)  POCT Blood Glucose.: 169 mg/dL (24 Oct 2024 22:55)  POCT Blood Glucose.: 216 mg/dL (24 Oct 2024 16:55)    Urinalysis Basic - ( 25 Oct 2024 04:06 )    Color: x / Appearance: x / SG: x / pH: x  Gluc: 156 mg/dL / Ketone: x  / Bili: x / Urobili: x   Blood: x / Protein: x / Nitrite: x   Leuk Esterase: x / RBC: x / WBC x   Sq Epi: x / Non Sq Epi: x / Bacteria: x        MICRODATA:      RADIOLOGY/OTHER STUDIES:    PCP  Pharmacy:   Emergency contact:

## 2024-10-25 NOTE — PROGRESS NOTE ADULT - SUBJECTIVE AND OBJECTIVE BOX
Patient discussed on morning rounds with Dr. Hernandez     Operation / Date: 10/23: MV repair EF 45%     SUBJECTIVE ASSESSMENT:  58y Male reports feel        Vital Signs Last 24 Hrs  T(C): 36.6 (25 Oct 2024 14:01), Max: 37.4 (25 Oct 2024 09:47)  T(F): 97.9 (25 Oct 2024 14:01), Max: 99.3 (25 Oct 2024 09:47)  HR: 74 (25 Oct 2024 11:00) (74 - 89)  BP: 103/58 (25 Oct 2024 11:00) (93/54 - 103/58)  BP(mean): 77 (25 Oct 2024 11:00) (68 - 77)  RR: 19 (25 Oct 2024 11:00) (12 - 25)  SpO2: 94% (25 Oct 2024 11:00) (94% - 99%)    Parameters below as of 25 Oct 2024 11:00  Patient On (Oxygen Delivery Method): room air      I&O's Detail    24 Oct 2024 07:01  -  25 Oct 2024 07:00  --------------------------------------------------------  IN:    Albumin 5%  - 250 mL: 500 mL    IV PiggyBack: 200 mL    Lactated Ringers Bolus: 1000 mL    Norepinephrine: 49.3 mL    Oral Fluid: 350 mL    sodium chloride 0.9%: 240 mL  Total IN: 2339.3 mL    OUT:    Chest Tube (mL): 225 mL    Indwelling Catheter - Urethral (mL): 1565 mL  Total OUT: 1790 mL    Total NET: 549.3 mL      25 Oct 2024 07:01  -  25 Oct 2024 16:03  --------------------------------------------------------  IN:    IV PiggyBack: 250 mL  Total IN: 250 mL    OUT:    Chest Tube (mL): 200 mL    Indwelling Catheter - Urethral (mL): 200 mL    Voided (mL): 350 mL  Total OUT: 750 mL    Total NET: -500 mL          CHEST TUBE:  Yes/No. AIR LEAKS: Yes/No. Suction / H2O SEAL.   TR DRAIN:  Yes/No.  EPICARDIAL WIRES: Yes/No.  TIE DOWNS: Yes/No.  ARCE: Yes/No.    PHYSICAL EXAM:    General:     Neurological:    Cardiovascular:    Respiratory:    Gastrointestinal:    Extremities:    Vascular:    Incision Sites:    LABS:                        10.0   11.83 )-----------( 102      ( 25 Oct 2024 04:06 )             29.5       COUMADIN:  Yes/No. REASON: .    PT/INR - ( 25 Oct 2024 04:06 )   PT: 13.4 sec;   INR: 1.17          PTT - ( 25 Oct 2024 04:06 )  PTT:30.5 sec    10-25    134[L]  |  102  |  8   ----------------------------<  156[H]  4.0   |  21[L]  |  0.72    Ca    8.0[L]      25 Oct 2024 04:06  Phos  1.1     10-25  Mg     2.2     10-25    TPro  6.0  /  Alb  3.5  /  TBili  0.6  /  DBili  x   /  AST  30  /  ALT  8[L]  /  AlkPhos  39[L]  10-25      Urinalysis Basic - ( 25 Oct 2024 04:06 )    Color: x / Appearance: x / SG: x / pH: x  Gluc: 156 mg/dL / Ketone: x  / Bili: x / Urobili: x   Blood: x / Protein: x / Nitrite: x   Leuk Esterase: x / RBC: x / WBC x   Sq Epi: x / Non Sq Epi: x / Bacteria: x        MEDICATIONS  (STANDING):  ascorbic acid 500 milliGRAM(s) Oral two times a day  aspirin enteric coated 81 milliGRAM(s) Oral daily  atorvastatin 10 milliGRAM(s) Oral at bedtime  chlorhexidine 2% Cloths 1 Application(s) Topical daily  dextrose 5%. 1000 milliLiter(s) (50 mL/Hr) IV Continuous <Continuous>  dextrose 5%. 1000 milliLiter(s) (100 mL/Hr) IV Continuous <Continuous>  dextrose 50% Injectable 50 milliLiter(s) IV Push every 15 minutes  dextrose 50% Injectable 25 milliLiter(s) IV Push every 15 minutes  gabapentin 100 milliGRAM(s) Oral every 8 hours  glucagon  Injectable 1 milliGRAM(s) IntraMuscular once  heparin   Injectable 5000 Unit(s) SubCutaneous every 8 hours  insulin lispro (ADMELOG) corrective regimen sliding scale   SubCutaneous Before meals and at bedtime  midodrine 10 milliGRAM(s) Oral every 8 hours  mupirocin 2% Ointment 1 Application(s) Both Nostrils two times a day  pantoprazole    Tablet 40 milliGRAM(s) Oral daily  polyethylene glycol 3350 17 Gram(s) Oral daily  polyethylene glycol 3350 17 Gram(s) Oral daily  senna 2 Tablet(s) Oral at bedtime  sodium chloride 0.9% lock flush 3 milliLiter(s) IV Push every 8 hours  tamsulosin 0.4 milliGRAM(s) Oral at bedtime    MEDICATIONS  (PRN):  acetaminophen     Tablet .. 650 milliGRAM(s) Oral every 6 hours PRN Mild Pain (1 - 3)  dextrose Oral Gel 15 Gram(s) Oral once PRN Blood Glucose LESS THAN 70 milliGRAM(s)/deciliter  oxyCODONE    IR 5 milliGRAM(s) Oral every 6 hours PRN Moderate Pain (4 - 6)        RADIOLOGY & ADDITIONAL TESTS:     Patient discussed on morning rounds with Dr. Hernandez     Operation / Date: 10/23: MV repair EF 45%     SUBJECTIVE ASSESSMENT:  58y Male reports being nervous about his blood sugar being so high before lunch. Denies chest pain or SOB. Denies dizziness or lightheadedness.         Vital Signs Last 24 Hrs  T(C): 36.6 (25 Oct 2024 14:01), Max: 37.4 (25 Oct 2024 09:47)  T(F): 97.9 (25 Oct 2024 14:01), Max: 99.3 (25 Oct 2024 09:47)  HR: 74 (25 Oct 2024 11:00) (74 - 89)  BP: 103/58 (25 Oct 2024 11:00) (93/54 - 103/58)  BP(mean): 77 (25 Oct 2024 11:00) (68 - 77)  RR: 19 (25 Oct 2024 11:00) (12 - 25)  SpO2: 94% (25 Oct 2024 11:00) (94% - 99%)    Parameters below as of 25 Oct 2024 11:00  Patient On (Oxygen Delivery Method): room air      I&O's Detail    24 Oct 2024 07:01  -  25 Oct 2024 07:00  --------------------------------------------------------  IN:    Albumin 5%  - 250 mL: 500 mL    IV PiggyBack: 200 mL    Lactated Ringers Bolus: 1000 mL    Norepinephrine: 49.3 mL    Oral Fluid: 350 mL    sodium chloride 0.9%: 240 mL  Total IN: 2339.3 mL    OUT:    Chest Tube (mL): 225 mL    Indwelling Catheter - Urethral (mL): 1565 mL  Total OUT: 1790 mL    Total NET: 549.3 mL      25 Oct 2024 07:01  -  25 Oct 2024 16:03  --------------------------------------------------------  IN:    IV PiggyBack: 250 mL  Total IN: 250 mL    OUT:    Chest Tube (mL): 200 mL    Indwelling Catheter - Urethral (mL): 200 mL    Voided (mL): 350 mL  Total OUT: 750 mL    Total NET: -500 mL          CHEST TUBE:  Yes.   TR DRAIN:  Yes  EPICARDIAL WIRES: No.  TIE DOWNS: No.  ARCE: No.    PHYSICAL EXAM:    GEN: NAD, looks comfortable  Neuro: A&Ox3.  No focal deficits.  Moving all extremities.   CV: S1S2, regular, no murmurs appreciated.  No carotid bruits.  No JVD  Lungs: Clear B/L.  No wheezing, rales or rhonchi  ABD: Soft, non-tender, non-distended.  +Bowel sounds  EXT: Warm and well perfused.  No peripheral edema noted. All Distal pulses palpable bilaterally.   Musculoskeletal: Moving all extremities with normal ROM, no joint swelling  Incisions: Right thoracotomy incision c/d/i.     LABS:                        10.0   11.83 )-----------( 102      ( 25 Oct 2024 04:06 )             29.5         PT/INR - ( 25 Oct 2024 04:06 )   PT: 13.4 sec;   INR: 1.17          PTT - ( 25 Oct 2024 04:06 )  PTT:30.5 sec    10-25    134[L]  |  102  |  8   ----------------------------<  156[H]  4.0   |  21[L]  |  0.72    Ca    8.0[L]      25 Oct 2024 04:06  Phos  1.1     10-25  Mg     2.2     10-25    TPro  6.0  /  Alb  3.5  /  TBili  0.6  /  DBili  x   /  AST  30  /  ALT  8[L]  /  AlkPhos  39[L]  10-25      Urinalysis Basic - ( 25 Oct 2024 04:06 )    Color: x / Appearance: x / SG: x / pH: x  Gluc: 156 mg/dL / Ketone: x  / Bili: x / Urobili: x   Blood: x / Protein: x / Nitrite: x   Leuk Esterase: x / RBC: x / WBC x   Sq Epi: x / Non Sq Epi: x / Bacteria: x        MEDICATIONS  (STANDING):  ascorbic acid 500 milliGRAM(s) Oral two times a day  aspirin enteric coated 81 milliGRAM(s) Oral daily  atorvastatin 10 milliGRAM(s) Oral at bedtime  chlorhexidine 2% Cloths 1 Application(s) Topical daily  dextrose 5%. 1000 milliLiter(s) (50 mL/Hr) IV Continuous <Continuous>  dextrose 5%. 1000 milliLiter(s) (100 mL/Hr) IV Continuous <Continuous>  dextrose 50% Injectable 50 milliLiter(s) IV Push every 15 minutes  dextrose 50% Injectable 25 milliLiter(s) IV Push every 15 minutes  gabapentin 100 milliGRAM(s) Oral every 8 hours  glucagon  Injectable 1 milliGRAM(s) IntraMuscular once  heparin   Injectable 5000 Unit(s) SubCutaneous every 8 hours  insulin lispro (ADMELOG) corrective regimen sliding scale   SubCutaneous Before meals and at bedtime  midodrine 10 milliGRAM(s) Oral every 8 hours  mupirocin 2% Ointment 1 Application(s) Both Nostrils two times a day  pantoprazole    Tablet 40 milliGRAM(s) Oral daily  polyethylene glycol 3350 17 Gram(s) Oral daily  polyethylene glycol 3350 17 Gram(s) Oral daily  senna 2 Tablet(s) Oral at bedtime  sodium chloride 0.9% lock flush 3 milliLiter(s) IV Push every 8 hours  tamsulosin 0.4 milliGRAM(s) Oral at bedtime    MEDICATIONS  (PRN):  acetaminophen     Tablet .. 650 milliGRAM(s) Oral every 6 hours PRN Mild Pain (1 - 3)  dextrose Oral Gel 15 Gram(s) Oral once PRN Blood Glucose LESS THAN 70 milliGRAM(s)/deciliter  oxyCODONE    IR 5 milliGRAM(s) Oral every 6 hours PRN Moderate Pain (4 - 6)        RADIOLOGY & ADDITIONAL TESTS:

## 2024-10-26 LAB
A1C WITH ESTIMATED AVERAGE GLUCOSE RESULT: 6.7 % — HIGH (ref 4–5.6)
ADD ON TEST-SPECIMEN IN LAB: SIGNIFICANT CHANGE UP
ANION GAP SERPL CALC-SCNC: 10 MMOL/L — SIGNIFICANT CHANGE UP (ref 5–17)
BUN SERPL-MCNC: 9 MG/DL — SIGNIFICANT CHANGE UP (ref 7–23)
CALCIUM SERPL-MCNC: 8 MG/DL — LOW (ref 8.4–10.5)
CHLORIDE SERPL-SCNC: 102 MMOL/L — SIGNIFICANT CHANGE UP (ref 96–108)
CO2 SERPL-SCNC: 25 MMOL/L — SIGNIFICANT CHANGE UP (ref 22–31)
CREAT SERPL-MCNC: 0.75 MG/DL — SIGNIFICANT CHANGE UP (ref 0.5–1.3)
EGFR: 105 ML/MIN/1.73M2 — SIGNIFICANT CHANGE UP
ESTIMATED AVERAGE GLUCOSE: 146 MG/DL — HIGH (ref 68–114)
GLUCOSE BLDC GLUCOMTR-MCNC: 170 MG/DL — HIGH (ref 70–99)
GLUCOSE BLDC GLUCOMTR-MCNC: 199 MG/DL — HIGH (ref 70–99)
GLUCOSE BLDC GLUCOMTR-MCNC: 200 MG/DL — HIGH (ref 70–99)
GLUCOSE BLDC GLUCOMTR-MCNC: 210 MG/DL — HIGH (ref 70–99)
GLUCOSE SERPL-MCNC: 135 MG/DL — HIGH (ref 70–99)
HCT VFR BLD CALC: 29.6 % — LOW (ref 39–50)
HGB BLD-MCNC: 9.9 G/DL — LOW (ref 13–17)
MAGNESIUM SERPL-MCNC: 2.1 MG/DL — SIGNIFICANT CHANGE UP (ref 1.6–2.6)
MCHC RBC-ENTMCNC: 31.3 PG — SIGNIFICANT CHANGE UP (ref 27–34)
MCHC RBC-ENTMCNC: 33.4 GM/DL — SIGNIFICANT CHANGE UP (ref 32–36)
MCV RBC AUTO: 93.7 FL — SIGNIFICANT CHANGE UP (ref 80–100)
NRBC # BLD: 0 /100 WBCS — SIGNIFICANT CHANGE UP (ref 0–0)
PLATELET # BLD AUTO: 99 K/UL — LOW (ref 150–400)
POTASSIUM SERPL-MCNC: 3.6 MMOL/L — SIGNIFICANT CHANGE UP (ref 3.5–5.3)
POTASSIUM SERPL-SCNC: 3.6 MMOL/L — SIGNIFICANT CHANGE UP (ref 3.5–5.3)
RBC # BLD: 3.16 M/UL — LOW (ref 4.2–5.8)
RBC # FLD: 14 % — SIGNIFICANT CHANGE UP (ref 10.3–14.5)
SODIUM SERPL-SCNC: 137 MMOL/L — SIGNIFICANT CHANGE UP (ref 135–145)
TSH SERPL-MCNC: 0.47 UIU/ML — SIGNIFICANT CHANGE UP (ref 0.27–4.2)
WBC # BLD: 9.29 K/UL — SIGNIFICANT CHANGE UP (ref 3.8–10.5)
WBC # FLD AUTO: 9.29 K/UL — SIGNIFICANT CHANGE UP (ref 3.8–10.5)

## 2024-10-26 PROCEDURE — 71045 X-RAY EXAM CHEST 1 VIEW: CPT | Mod: 26

## 2024-10-26 PROCEDURE — 99232 SBSQ HOSP IP/OBS MODERATE 35: CPT

## 2024-10-26 RX ORDER — METHYLPREDNISOLONE ACETATE 80 MG/ML
4 INJECTION, SUSPENSION INTRALESIONAL; INTRAMUSCULAR; INTRASYNOVIAL; SOFT TISSUE
Refills: 0 | Status: COMPLETED | OUTPATIENT
Start: 2024-10-27 | End: 2024-10-29

## 2024-10-26 RX ORDER — METHYLPREDNISOLONE ACETATE 80 MG/ML
INJECTION, SUSPENSION INTRALESIONAL; INTRAMUSCULAR; INTRASYNOVIAL; SOFT TISSUE
Refills: 0 | Status: DISCONTINUED | OUTPATIENT
Start: 2024-10-26 | End: 2024-10-29

## 2024-10-26 RX ORDER — FIRST AID ANTIBIOTIC 500 [USP'U]/G
1 OINTMENT TOPICAL DAILY
Refills: 0 | Status: DISCONTINUED | OUTPATIENT
Start: 2024-10-26 | End: 2024-10-29

## 2024-10-26 RX ORDER — POTASSIUM CHLORIDE 10 MEQ
20 TABLET, EXTENDED RELEASE ORAL DAILY
Refills: 0 | Status: DISCONTINUED | OUTPATIENT
Start: 2024-10-26 | End: 2024-10-29

## 2024-10-26 RX ORDER — METHYLPREDNISOLONE ACETATE 80 MG/ML
4 INJECTION, SUSPENSION INTRALESIONAL; INTRAMUSCULAR; INTRASYNOVIAL; SOFT TISSUE
Refills: 0 | Status: COMPLETED | OUTPATIENT
Start: 2024-10-27 | End: 2024-10-28

## 2024-10-26 RX ORDER — METHYLPREDNISOLONE ACETATE 80 MG/ML
4 INJECTION, SUSPENSION INTRALESIONAL; INTRAMUSCULAR; INTRASYNOVIAL; SOFT TISSUE
Refills: 0 | Status: DISCONTINUED | OUTPATIENT
Start: 2024-10-27 | End: 2024-10-29

## 2024-10-26 RX ORDER — METHYLPREDNISOLONE ACETATE 80 MG/ML
8 INJECTION, SUSPENSION INTRALESIONAL; INTRAMUSCULAR; INTRASYNOVIAL; SOFT TISSUE AT BEDTIME
Refills: 0 | Status: COMPLETED | OUTPATIENT
Start: 2024-10-27 | End: 2024-10-27

## 2024-10-26 RX ORDER — FUROSEMIDE 40 MG
40 TABLET ORAL DAILY
Refills: 0 | Status: DISCONTINUED | OUTPATIENT
Start: 2024-10-26 | End: 2024-10-29

## 2024-10-26 RX ORDER — METHYLPREDNISOLONE ACETATE 80 MG/ML
24 INJECTION, SUSPENSION INTRALESIONAL; INTRAMUSCULAR; INTRASYNOVIAL; SOFT TISSUE ONCE
Refills: 0 | Status: DISCONTINUED | OUTPATIENT
Start: 2024-10-26 | End: 2024-10-29

## 2024-10-26 RX ORDER — METHYLPREDNISOLONE ACETATE 80 MG/ML
4 INJECTION, SUSPENSION INTRALESIONAL; INTRAMUSCULAR; INTRASYNOVIAL; SOFT TISSUE AT BEDTIME
Refills: 0 | Status: DISCONTINUED | OUTPATIENT
Start: 2024-10-28 | End: 2024-10-29

## 2024-10-26 RX ORDER — POTASSIUM CHLORIDE 10 MEQ
40 TABLET, EXTENDED RELEASE ORAL ONCE
Refills: 0 | Status: COMPLETED | OUTPATIENT
Start: 2024-10-26 | End: 2024-10-26

## 2024-10-26 RX ORDER — DIPHENHYDRAMINE HCL 12.5MG/5ML
25 ELIXIR ORAL ONCE
Refills: 0 | Status: COMPLETED | OUTPATIENT
Start: 2024-10-26 | End: 2024-10-26

## 2024-10-26 RX ADMIN — GABAPENTIN 100 MILLIGRAM(S): 300 CAPSULE ORAL at 06:26

## 2024-10-26 RX ADMIN — PANTOPRAZOLE SODIUM 40 MILLIGRAM(S): 40 TABLET, DELAYED RELEASE ORAL at 06:26

## 2024-10-26 RX ADMIN — Medication 650 MILLIGRAM(S): at 21:37

## 2024-10-26 RX ADMIN — Medication 81 MILLIGRAM(S): at 12:13

## 2024-10-26 RX ADMIN — FIRST AID ANTIBIOTIC 1 APPLICATION(S): 500 OINTMENT TOPICAL at 17:52

## 2024-10-26 RX ADMIN — MIDODRINE HYDROCHLORIDE 10 MILLIGRAM(S): 2.5 TABLET ORAL at 14:23

## 2024-10-26 RX ADMIN — SODIUM CHLORIDE 3 MILLILITER(S): 9 INJECTION, SOLUTION INTRAMUSCULAR; INTRAVENOUS; SUBCUTANEOUS at 06:32

## 2024-10-26 RX ADMIN — MUPIROCIN 1 APPLICATION(S): 20 OINTMENT TOPICAL at 17:54

## 2024-10-26 RX ADMIN — GABAPENTIN 100 MILLIGRAM(S): 300 CAPSULE ORAL at 14:23

## 2024-10-26 RX ADMIN — HEPARIN SODIUM 5000 UNIT(S): 10000 INJECTION INTRAVENOUS; SUBCUTANEOUS at 14:24

## 2024-10-26 RX ADMIN — CHLORHEXIDINE GLUCONATE 1 APPLICATION(S): 40 SOLUTION TOPICAL at 06:31

## 2024-10-26 RX ADMIN — SODIUM CHLORIDE 3 MILLILITER(S): 9 INJECTION, SOLUTION INTRAMUSCULAR; INTRAVENOUS; SUBCUTANEOUS at 14:28

## 2024-10-26 RX ADMIN — Medication 500 MILLIGRAM(S): at 17:45

## 2024-10-26 RX ADMIN — Medication 20 MILLIEQUIVALENT(S): at 12:13

## 2024-10-26 RX ADMIN — OXYCODONE HYDROCHLORIDE 5 MILLIGRAM(S): 30 TABLET ORAL at 14:27

## 2024-10-26 RX ADMIN — Medication 500 MILLIGRAM(S): at 06:27

## 2024-10-26 RX ADMIN — Medication 2 TABLET(S): at 21:39

## 2024-10-26 RX ADMIN — Medication 650 MILLIGRAM(S): at 22:37

## 2024-10-26 RX ADMIN — Medication 40 MILLIEQUIVALENT(S): at 08:02

## 2024-10-26 RX ADMIN — Medication 40 MILLIGRAM(S): at 12:13

## 2024-10-26 RX ADMIN — MIDODRINE HYDROCHLORIDE 10 MILLIGRAM(S): 2.5 TABLET ORAL at 21:38

## 2024-10-26 RX ADMIN — Medication 650 MILLIGRAM(S): at 09:07

## 2024-10-26 RX ADMIN — HEPARIN SODIUM 5000 UNIT(S): 10000 INJECTION INTRAVENOUS; SUBCUTANEOUS at 06:27

## 2024-10-26 RX ADMIN — GABAPENTIN 100 MILLIGRAM(S): 300 CAPSULE ORAL at 21:39

## 2024-10-26 RX ADMIN — Medication 25 MILLIGRAM(S): at 21:57

## 2024-10-26 RX ADMIN — SODIUM CHLORIDE 3 MILLILITER(S): 9 INJECTION, SOLUTION INTRAMUSCULAR; INTRAVENOUS; SUBCUTANEOUS at 22:29

## 2024-10-26 RX ADMIN — Medication 2: at 07:05

## 2024-10-26 RX ADMIN — MUPIROCIN 1 APPLICATION(S): 20 OINTMENT TOPICAL at 06:32

## 2024-10-26 RX ADMIN — Medication 2: at 21:40

## 2024-10-26 RX ADMIN — Medication 0.4 MILLIGRAM(S): at 21:38

## 2024-10-26 RX ADMIN — Medication 650 MILLIGRAM(S): at 10:04

## 2024-10-26 RX ADMIN — Medication 10 MILLIGRAM(S): at 21:39

## 2024-10-26 RX ADMIN — Medication 2: at 17:44

## 2024-10-26 NOTE — PROGRESS NOTE ADULT - SUBJECTIVE AND OBJECTIVE BOX
Patient is a 58y old  Male who presents with a chief complaint of Mitral Valve Stenosis (26 Oct 2024 10:53)    INTERVAL EVENTS: Fever 102.2*F , TLC removed.     SUBJECTIVE:  Patient was seen and examined at bedside.OOBTC, postop pain improving, denies cough, SOB, urinary symptoms, abd pain, etc.     Review of systems: No fever, chills, dizziness, HA, Changes in vision, CP, dyspnea, nausea or vomiting, dysuria, changes in bowel movements, LE edema. Rest of 12 point Review of systems negative unless otherwise documented elsewhere in note.     Diet, Consistent Carbohydrate/No Snacks:   DASH/TLC Sodium & Cholesterol Restricted (DASH) (10-23-24 @ 14:46) [Active]      MEDICATIONS:  MEDICATIONS  (STANDING):  ascorbic acid 500 milliGRAM(s) Oral two times a day  aspirin enteric coated 81 milliGRAM(s) Oral daily  atorvastatin 10 milliGRAM(s) Oral at bedtime  chlorhexidine 2% Cloths 1 Application(s) Topical daily  dextrose 5%. 1000 milliLiter(s) (50 mL/Hr) IV Continuous <Continuous>  dextrose 5%. 1000 milliLiter(s) (100 mL/Hr) IV Continuous <Continuous>  dextrose 50% Injectable 50 milliLiter(s) IV Push every 15 minutes  dextrose 50% Injectable 25 milliLiter(s) IV Push every 15 minutes  furosemide    Tablet 40 milliGRAM(s) Oral daily  gabapentin 100 milliGRAM(s) Oral every 8 hours  glucagon  Injectable 1 milliGRAM(s) IntraMuscular once  heparin   Injectable 5000 Unit(s) SubCutaneous every 8 hours  insulin lispro (ADMELOG) corrective regimen sliding scale   SubCutaneous Before meals and at bedtime  methylPREDNISolone 24 milliGRAM(s) Oral once  methylPREDNISolone   Oral   midodrine 10 milliGRAM(s) Oral every 8 hours  mupirocin 2% Ointment 1 Application(s) Both Nostrils two times a day  pantoprazole    Tablet 40 milliGRAM(s) Oral daily  polyethylene glycol 3350 17 Gram(s) Oral daily  potassium chloride    Tablet ER 20 milliEquivalent(s) Oral daily  senna 2 Tablet(s) Oral at bedtime  sodium chloride 0.9% lock flush 3 milliLiter(s) IV Push every 8 hours  tamsulosin 0.4 milliGRAM(s) Oral at bedtime    MEDICATIONS  (PRN):  acetaminophen     Tablet .. 650 milliGRAM(s) Oral every 6 hours PRN Mild Pain (1 - 3)  dextrose Oral Gel 15 Gram(s) Oral once PRN Blood Glucose LESS THAN 70 milliGRAM(s)/deciliter  oxyCODONE    IR 5 milliGRAM(s) Oral every 6 hours PRN Moderate Pain (4 - 6)      Allergies    latex (Other; Rash)  No Known Drug Allergies  adhesives (Blisters)    Intolerances        OBJECTIVE:  Vital Signs Last 24 Hrs  T(C): 37.1 (26 Oct 2024 09:34), Max: 39 (25 Oct 2024 22:28)  T(F): 98.8 (26 Oct 2024 09:34), Max: 102.2 (25 Oct 2024 22:28)  HR: 109 (26 Oct 2024 09:00) (84 - 109)  BP: 117/60 (26 Oct 2024 09:00) (114/64 - 121/60)  BP(mean): 79 (26 Oct 2024 09:00) (75 - 83)  RR: 18 (26 Oct 2024 09:00) (18 - 19)  SpO2: 95% (26 Oct 2024 09:00) (95% - 96%)    Parameters below as of 26 Oct 2024 09:00  Patient On (Oxygen Delivery Method): room air      I&O's Summary    25 Oct 2024 07:01  -  26 Oct 2024 07:00  --------------------------------------------------------  IN: 370 mL / OUT: 1425 mL / NET: -1055 mL    26 Oct 2024 07:01  -  26 Oct 2024 13:05  --------------------------------------------------------  IN: 0 mL / OUT: 355 mL / NET: -355 mL        PHYSICAL EXAM:  Gen: Reclining in bed at time of exam, appears stated age  HEENT: NCAT, MMM, clear OP  Neck: supple, trachea at midline  CV: RRR, +S1/S2  Pulm: adequate respiratory effort, no increase in work of breathing  Abd: soft, NTND  Skin: warm and dry,   Ext: WWP, no LE edema  Neuro: AOx3, no gross focal neurological deficits  Psych: affect and behavior appropriate, pleasant at time of interview  :     LABS:                        9.9    9.29  )-----------( 99       ( 26 Oct 2024 06:38 )             29.6     10-26    137  |  102  |  9   ----------------------------<  135[H]  3.6   |  25  |  0.75    Ca    8.0[L]      26 Oct 2024 06:38  Phos  1.1     10-25  Mg     2.1     10-26    TPro  6.0  /  Alb  3.5  /  TBili  0.6  /  DBili  x   /  AST  30  /  ALT  8[L]  /  AlkPhos  39[L]  10-25    LIVER FUNCTIONS - ( 25 Oct 2024 04:06 )  Alb: 3.5 g/dL / Pro: 6.0 g/dL / ALK PHOS: 39 U/L / ALT: 8 U/L / AST: 30 U/L / GGT: x           PT/INR - ( 25 Oct 2024 04:06 )   PT: 13.4 sec;   INR: 1.17          PTT - ( 25 Oct 2024 04:06 )  PTT:30.5 sec  CAPILLARY BLOOD GLUCOSE      POCT Blood Glucose.: 210 mg/dL (26 Oct 2024 11:44)  POCT Blood Glucose.: 170 mg/dL (26 Oct 2024 06:53)  POCT Blood Glucose.: 168 mg/dL (25 Oct 2024 21:38)  POCT Blood Glucose.: 196 mg/dL (25 Oct 2024 16:48)    Urinalysis Basic - ( 26 Oct 2024 06:38 )    Color: x / Appearance: x / SG: x / pH: x  Gluc: 135 mg/dL / Ketone: x  / Bili: x / Urobili: x   Blood: x / Protein: x / Nitrite: x   Leuk Esterase: x / RBC: x / WBC x   Sq Epi: x / Non Sq Epi: x / Bacteria: x        MICRODATA:    Urinalysis with Rflx Culture (collected 25 Oct 2024 23:43)        RADIOLOGY/OTHER STUDIES:    PCP  Pharmacy:   Emergency contact:

## 2024-10-26 NOTE — PROGRESS NOTE ADULT - SUBJECTIVE AND OBJECTIVE BOX
Patient discussed on morning rounds with Dr. Guadalupe / Dr. Hernandez     OPERATION & DATE: 10/23/24 - MV replacement     SUBJECTIVE ASSESSMENT: Pt feeling well this morning, concerned about how much fluid is draining from tube but feels reassured after discussing with team.     VITAL SIGNS:  Vital Signs Last 24 Hrs  T(C): 37.1 (26 Oct 2024 09:34), Max: 39 (25 Oct 2024 22:28)  T(F): 98.8 (26 Oct 2024 09:34), Max: 102.2 (25 Oct 2024 22:28)  HR: 109 (26 Oct 2024 09:00) (74 - 109)  BP: 117/60 (26 Oct 2024 09:00) (103/58 - 121/60)  BP(mean): 79 (26 Oct 2024 09:00) (75 - 83)  RR: 18 (26 Oct 2024 09:00) (18 - 19)  SpO2: 95% (26 Oct 2024 09:00) (94% - 96%)    Parameters below as of 26 Oct 2024 09:00  Patient On (Oxygen Delivery Method): room air      I&O's Detail    25 Oct 2024 07:01  -  26 Oct 2024 07:00  --------------------------------------------------------  IN:    IV PiggyBack: 250 mL    Oral Fluid: 120 mL  Total IN: 370 mL    OUT:    Chest Tube (mL): 225 mL    Indwelling Catheter - Urethral (mL): 200 mL    Voided (mL): 1000 mL  Total OUT: 1425 mL    Total NET: -1055 mL      26 Oct 2024 07:01  -  26 Oct 2024 10:53  --------------------------------------------------------  IN:  Total IN: 0 mL    OUT:    Chest Tube (mL): 355 mL  Total OUT: 355 mL    Total NET: -355 mL    CHEST TUBE:  1 right pleural CT  TR DRAIN:   none   EPICARDIAL WIRES: none   STITCHES: yes x1   STAPLES: none   ARCE:  none   CENTRAL LINE: none   MIDLINE/PICC: none   WOUND VAC: none     PHYSICAL EXAM:  General: well appearing sitting in chair in NAD   HEENT: normocephalic   Cardio: normal s1/s2  Pulm: lungs cta  GI: +BS 4 quadrants   Extremities: no edema, no calf tenderness  Vascular: dp 2+ b/l   Incisions: right mini thoracotomy and vats incisions healing well ecchymosis, chest tube with dressing c/d/i     LABS:                        9.9    9.29  )-----------( 99       ( 26 Oct 2024 06:38 )             29.6     PT/INR - ( 25 Oct 2024 04:06 )   PT: 13.4 sec;   INR: 1.17    PTT - ( 25 Oct 2024 04:06 )  PTT:30.5 sec    10-26    137  |  102  |  9   ----------------------------<  135[H]  3.6   |  25  |  0.75    Ca    8.0[L]      26 Oct 2024 06:38  Phos  1.1     10-25  Mg     2.1     10-26    TPro  6.0  /  Alb  3.5  /  TBili  0.6  /  DBili  x   /  AST  30  /  ALT  8[L]  /  AlkPhos  39[L]  10-25    Urinalysis Basic - ( 26 Oct 2024 06:38 )    Color: x / Appearance: x / SG: x / pH: x  Gluc: 135 mg/dL / Ketone: x  / Bili: x / Urobili: x   Blood: x / Protein: x / Nitrite: x   Leuk Esterase: x / RBC: x / WBC x   Sq Epi: x / Non Sq Epi: x / Bacteria: x      MEDICATIONS  (STANDING):  ascorbic acid 500 milliGRAM(s) Oral two times a day  aspirin enteric coated 81 milliGRAM(s) Oral daily  atorvastatin 10 milliGRAM(s) Oral at bedtime  chlorhexidine 2% Cloths 1 Application(s) Topical daily  dextrose 5%. 1000 milliLiter(s) (50 mL/Hr) IV Continuous <Continuous>  dextrose 5%. 1000 milliLiter(s) (100 mL/Hr) IV Continuous <Continuous>  dextrose 50% Injectable 25 milliLiter(s) IV Push every 15 minutes  dextrose 50% Injectable 50 milliLiter(s) IV Push every 15 minutes  furosemide    Tablet 40 milliGRAM(s) Oral daily  gabapentin 100 milliGRAM(s) Oral every 8 hours  glucagon  Injectable 1 milliGRAM(s) IntraMuscular once  heparin   Injectable 5000 Unit(s) SubCutaneous every 8 hours  insulin lispro (ADMELOG) corrective regimen sliding scale   SubCutaneous Before meals and at bedtime  midodrine 10 milliGRAM(s) Oral every 8 hours  mupirocin 2% Ointment 1 Application(s) Both Nostrils two times a day  pantoprazole    Tablet 40 milliGRAM(s) Oral daily  polyethylene glycol 3350 17 Gram(s) Oral daily  potassium chloride    Tablet ER 20 milliEquivalent(s) Oral daily  senna 2 Tablet(s) Oral at bedtime  sodium chloride 0.9% lock flush 3 milliLiter(s) IV Push every 8 hours  tamsulosin 0.4 milliGRAM(s) Oral at bedtime    MEDICATIONS  (PRN):  acetaminophen     Tablet .. 650 milliGRAM(s) Oral every 6 hours PRN Mild Pain (1 - 3)  dextrose Oral Gel 15 Gram(s) Oral once PRN Blood Glucose LESS THAN 70 milliGRAM(s)/deciliter  oxyCODONE    IR 5 milliGRAM(s) Oral every 6 hours PRN Moderate Pain (4 - 6)    RADIOLOGY & ADDITIONAL TESTS:  < from: Xray Chest 1 View- PORTABLE-Routine (Xray Chest 1 View- PORTABLE-Routine in AM.) (10.26.24 @ 06:46) >  IMPRESSION: Improvement congestion and/or infiltrates. Improvement left   effusion  < end of copied text >

## 2024-10-27 LAB
ANION GAP SERPL CALC-SCNC: 11 MMOL/L — SIGNIFICANT CHANGE UP (ref 5–17)
BUN SERPL-MCNC: 12 MG/DL — SIGNIFICANT CHANGE UP (ref 7–23)
CALCIUM SERPL-MCNC: 9.1 MG/DL — SIGNIFICANT CHANGE UP (ref 8.4–10.5)
CHLORIDE SERPL-SCNC: 100 MMOL/L — SIGNIFICANT CHANGE UP (ref 96–108)
CO2 SERPL-SCNC: 27 MMOL/L — SIGNIFICANT CHANGE UP (ref 22–31)
CREAT SERPL-MCNC: 0.79 MG/DL — SIGNIFICANT CHANGE UP (ref 0.5–1.3)
EGFR: 104 ML/MIN/1.73M2 — SIGNIFICANT CHANGE UP
GLUCOSE BLDC GLUCOMTR-MCNC: 167 MG/DL — HIGH (ref 70–99)
GLUCOSE BLDC GLUCOMTR-MCNC: 234 MG/DL — HIGH (ref 70–99)
GLUCOSE BLDC GLUCOMTR-MCNC: 260 MG/DL — HIGH (ref 70–99)
GLUCOSE BLDC GLUCOMTR-MCNC: 347 MG/DL — HIGH (ref 70–99)
GLUCOSE SERPL-MCNC: 178 MG/DL — HIGH (ref 70–99)
HCT VFR BLD CALC: 37 % — LOW (ref 39–50)
HGB BLD-MCNC: 12.1 G/DL — LOW (ref 13–17)
MAGNESIUM SERPL-MCNC: 2.1 MG/DL — SIGNIFICANT CHANGE UP (ref 1.6–2.6)
MCHC RBC-ENTMCNC: 31.5 PG — SIGNIFICANT CHANGE UP (ref 27–34)
MCHC RBC-ENTMCNC: 32.7 GM/DL — SIGNIFICANT CHANGE UP (ref 32–36)
MCV RBC AUTO: 96.4 FL — SIGNIFICANT CHANGE UP (ref 80–100)
NRBC # BLD: 0 /100 WBCS — SIGNIFICANT CHANGE UP (ref 0–0)
PLATELET # BLD AUTO: 141 K/UL — LOW (ref 150–400)
POTASSIUM SERPL-MCNC: 4.1 MMOL/L — SIGNIFICANT CHANGE UP (ref 3.5–5.3)
POTASSIUM SERPL-SCNC: 4.1 MMOL/L — SIGNIFICANT CHANGE UP (ref 3.5–5.3)
RBC # BLD: 3.84 M/UL — LOW (ref 4.2–5.8)
RBC # FLD: 14 % — SIGNIFICANT CHANGE UP (ref 10.3–14.5)
SODIUM SERPL-SCNC: 138 MMOL/L — SIGNIFICANT CHANGE UP (ref 135–145)
WBC # BLD: 6.96 K/UL — SIGNIFICANT CHANGE UP (ref 3.8–10.5)
WBC # FLD AUTO: 6.96 K/UL — SIGNIFICANT CHANGE UP (ref 3.8–10.5)

## 2024-10-27 PROCEDURE — 71045 X-RAY EXAM CHEST 1 VIEW: CPT | Mod: 26,59

## 2024-10-27 PROCEDURE — 99232 SBSQ HOSP IP/OBS MODERATE 35: CPT

## 2024-10-27 PROCEDURE — 71046 X-RAY EXAM CHEST 2 VIEWS: CPT | Mod: 26

## 2024-10-27 RX ORDER — FUROSEMIDE 40 MG
20 TABLET ORAL ONCE
Refills: 0 | Status: COMPLETED | OUTPATIENT
Start: 2024-10-27 | End: 2024-10-27

## 2024-10-27 RX ORDER — INSULIN LISPRO 100/ML
3 VIAL (ML) SUBCUTANEOUS
Refills: 0 | Status: DISCONTINUED | OUTPATIENT
Start: 2024-10-27 | End: 2024-10-29

## 2024-10-27 RX ORDER — GLUCAGON INJECTION, SOLUTION 1 MG/.2ML
1 INJECTION, SOLUTION SUBCUTANEOUS ONCE
Refills: 0 | Status: DISCONTINUED | OUTPATIENT
Start: 2024-10-27 | End: 2024-10-29

## 2024-10-27 RX ORDER — INSULIN LISPRO 100/ML
VIAL (ML) SUBCUTANEOUS
Refills: 0 | Status: DISCONTINUED | OUTPATIENT
Start: 2024-10-27 | End: 2024-10-29

## 2024-10-27 RX ORDER — MIDODRINE HYDROCHLORIDE 2.5 MG/1
5 TABLET ORAL EVERY 8 HOURS
Refills: 0 | Status: DISCONTINUED | OUTPATIENT
Start: 2024-10-27 | End: 2024-10-28

## 2024-10-27 RX ORDER — INSULIN GLARGINE,HUM.REC.ANLOG 100/ML
10 VIAL (ML) SUBCUTANEOUS AT BEDTIME
Refills: 0 | Status: DISCONTINUED | OUTPATIENT
Start: 2024-10-27 | End: 2024-10-29

## 2024-10-27 RX ORDER — POTASSIUM CHLORIDE 10 MEQ
20 TABLET, EXTENDED RELEASE ORAL ONCE
Refills: 0 | Status: COMPLETED | OUTPATIENT
Start: 2024-10-27 | End: 2024-10-27

## 2024-10-27 RX ADMIN — Medication 3 UNIT(S): at 16:41

## 2024-10-27 RX ADMIN — SODIUM CHLORIDE 3 MILLILITER(S): 9 INJECTION, SOLUTION INTRAMUSCULAR; INTRAVENOUS; SUBCUTANEOUS at 21:36

## 2024-10-27 RX ADMIN — METHYLPREDNISOLONE ACETATE 8 MILLIGRAM(S): 80 INJECTION, SUSPENSION INTRALESIONAL; INTRAMUSCULAR; INTRASYNOVIAL; SOFT TISSUE at 21:15

## 2024-10-27 RX ADMIN — HEPARIN SODIUM 5000 UNIT(S): 10000 INJECTION INTRAVENOUS; SUBCUTANEOUS at 14:05

## 2024-10-27 RX ADMIN — Medication 81 MILLIGRAM(S): at 11:09

## 2024-10-27 RX ADMIN — Medication 650 MILLIGRAM(S): at 22:15

## 2024-10-27 RX ADMIN — GABAPENTIN 100 MILLIGRAM(S): 300 CAPSULE ORAL at 21:16

## 2024-10-27 RX ADMIN — Medication 650 MILLIGRAM(S): at 08:40

## 2024-10-27 RX ADMIN — Medication 650 MILLIGRAM(S): at 21:15

## 2024-10-27 RX ADMIN — SODIUM CHLORIDE 3 MILLILITER(S): 9 INJECTION, SOLUTION INTRAMUSCULAR; INTRAVENOUS; SUBCUTANEOUS at 06:34

## 2024-10-27 RX ADMIN — Medication 20 MILLIGRAM(S): at 09:48

## 2024-10-27 RX ADMIN — MIDODRINE HYDROCHLORIDE 10 MILLIGRAM(S): 2.5 TABLET ORAL at 14:06

## 2024-10-27 RX ADMIN — MUPIROCIN 1 APPLICATION(S): 20 OINTMENT TOPICAL at 06:34

## 2024-10-27 RX ADMIN — Medication 0.4 MILLIGRAM(S): at 21:15

## 2024-10-27 RX ADMIN — CHLORHEXIDINE GLUCONATE 1 APPLICATION(S): 40 SOLUTION TOPICAL at 06:34

## 2024-10-27 RX ADMIN — HEPARIN SODIUM 5000 UNIT(S): 10000 INJECTION INTRAVENOUS; SUBCUTANEOUS at 06:02

## 2024-10-27 RX ADMIN — METHYLPREDNISOLONE ACETATE 4 MILLIGRAM(S): 80 INJECTION, SUSPENSION INTRALESIONAL; INTRAMUSCULAR; INTRASYNOVIAL; SOFT TISSUE at 06:02

## 2024-10-27 RX ADMIN — Medication 10 MILLIGRAM(S): at 21:16

## 2024-10-27 RX ADMIN — GABAPENTIN 100 MILLIGRAM(S): 300 CAPSULE ORAL at 14:05

## 2024-10-27 RX ADMIN — Medication 650 MILLIGRAM(S): at 14:52

## 2024-10-27 RX ADMIN — MIDODRINE HYDROCHLORIDE 5 MILLIGRAM(S): 2.5 TABLET ORAL at 21:16

## 2024-10-27 RX ADMIN — SODIUM CHLORIDE 3 MILLILITER(S): 9 INJECTION, SOLUTION INTRAMUSCULAR; INTRAVENOUS; SUBCUTANEOUS at 14:03

## 2024-10-27 RX ADMIN — METHYLPREDNISOLONE ACETATE 4 MILLIGRAM(S): 80 INJECTION, SUSPENSION INTRALESIONAL; INTRAMUSCULAR; INTRASYNOVIAL; SOFT TISSUE at 18:30

## 2024-10-27 RX ADMIN — FIRST AID ANTIBIOTIC 1 APPLICATION(S): 500 OINTMENT TOPICAL at 11:10

## 2024-10-27 RX ADMIN — Medication 650 MILLIGRAM(S): at 14:05

## 2024-10-27 RX ADMIN — POLYETHYLENE GLYCOL 3350 17 GRAM(S): 17 POWDER, FOR SOLUTION ORAL at 11:07

## 2024-10-27 RX ADMIN — MIDODRINE HYDROCHLORIDE 10 MILLIGRAM(S): 2.5 TABLET ORAL at 06:02

## 2024-10-27 RX ADMIN — Medication 8: at 11:09

## 2024-10-27 RX ADMIN — Medication 40 MILLIGRAM(S): at 06:27

## 2024-10-27 RX ADMIN — PANTOPRAZOLE SODIUM 40 MILLIGRAM(S): 40 TABLET, DELAYED RELEASE ORAL at 06:01

## 2024-10-27 RX ADMIN — HEPARIN SODIUM 5000 UNIT(S): 10000 INJECTION INTRAVENOUS; SUBCUTANEOUS at 21:15

## 2024-10-27 RX ADMIN — Medication 10 UNIT(S): at 22:44

## 2024-10-27 RX ADMIN — Medication 3 UNIT(S): at 11:09

## 2024-10-27 RX ADMIN — Medication 500 MILLIGRAM(S): at 18:30

## 2024-10-27 RX ADMIN — Medication 20 MILLIEQUIVALENT(S): at 09:48

## 2024-10-27 RX ADMIN — Medication 650 MILLIGRAM(S): at 09:30

## 2024-10-27 RX ADMIN — Medication 2: at 07:03

## 2024-10-27 RX ADMIN — GABAPENTIN 100 MILLIGRAM(S): 300 CAPSULE ORAL at 06:02

## 2024-10-27 RX ADMIN — Medication 500 MILLIGRAM(S): at 06:02

## 2024-10-27 RX ADMIN — Medication 4: at 16:42

## 2024-10-27 NOTE — PROGRESS NOTE ADULT - SUBJECTIVE AND OBJECTIVE BOX
Patient discussed on morning rounds with Dr. Hernandez/Collins     OPERATION & DATE: 10/23/24 MV Replacement     SUBJECTIVE ASSESSMENT: Patient seen and examined at bedside this morning. He states he is feeling well today, endorses some soreness surrounding his incision site. He denies any CP, SOB, ELLIOTT, palpitations, N/V/D at this time. He is pulling his IS to 1500cc and saturating well on RA.    VITAL SIGNS:  Vital Signs Last 24 Hrs  T(C): 36.8 (27 Oct 2024 12:27), Max: 37.3 (26 Oct 2024 21:16)  T(F): 98.2 (27 Oct 2024 12:27), Max: 99.1 (26 Oct 2024 21:16)  HR: 85 (27 Oct 2024 14:00) (80 - 113)  BP: 97/55 (27 Oct 2024 14:00) (97/55 - 111/65)  BP(mean): 71 (27 Oct 2024 14:00) (71 - 83)  RR: 16 (27 Oct 2024 08:30) (16 - 23)  SpO2: 96% (27 Oct 2024 14:00) (91% - 98%)    Parameters below as of 27 Oct 2024 08:30  Patient On (Oxygen Delivery Method): room air      I&O's Detail    26 Oct 2024 07:01  -  27 Oct 2024 07:00  --------------------------------------------------------  IN:    Oral Fluid: 857 mL  Total IN: 857 mL    OUT:    Chest Tube (mL): 535 mL    Voided (mL): 1100 mL  Total OUT: 1635 mL    Total NET: -778 mL      27 Oct 2024 07:01  -  27 Oct 2024 16:43  --------------------------------------------------------  IN:  Total IN: 0 mL    OUT:    Chest Tube (mL): 20 mL    Voided (mL): 1000 mL  Total OUT: 1020 mL    Total NET: -1020 mL        CHEST TUBE:  yes   EPICARDIAL WIRES: no  STITCHES: yes tie down    PHYSICAL EXAM:  General: Patient lying comfortably in bed, no acute distress     Neurological: Alert and oriented. No focal neurological deficits     Cardiovascular: S1S2, RRR, no murmurs appreciated on exam     Respiratory: Clear to ausculation bilaterally, no wheeze/rhonchi/rales    Gastrointestinal: + BS, soft, non tender, non distended     Extremities: Warm and well perfused. No edema, no calf tenderness     Vascular: 2+ Peripheral pulses b/l     Incision Sites: R mini thoracotomy clean and intact with dermabond applied. R groin cutdown clean and intact with dermabond applied. Occlusive dressing applied to CT site.    LABS:                        12.1   6.96  )-----------( 141      ( 27 Oct 2024 05:30 )             37.0       10-27    138  |  100  |  12  ----------------------------<  178[H]  4.1   |  27  |  0.79    Ca    9.1      27 Oct 2024 05:30  Mg     2.1     10-27      Urinalysis Basic - ( 27 Oct 2024 05:30 )    Color: x / Appearance: x / SG: x / pH: x  Gluc: 178 mg/dL / Ketone: x  / Bili: x / Urobili: x   Blood: x / Protein: x / Nitrite: x   Leuk Esterase: x / RBC: x / WBC x   Sq Epi: x / Non Sq Epi: x / Bacteria: x      MEDICATIONS  (STANDING):  ascorbic acid 500 milliGRAM(s) Oral two times a day  aspirin enteric coated 81 milliGRAM(s) Oral daily  atorvastatin 10 milliGRAM(s) Oral at bedtime  bacitracin   Ointment 1 Application(s) Topical daily  chlorhexidine 2% Cloths 1 Application(s) Topical daily  dextrose 5%. 1000 milliLiter(s) (100 mL/Hr) IV Continuous <Continuous>  dextrose 5%. 1000 milliLiter(s) (50 mL/Hr) IV Continuous <Continuous>  dextrose 5%. 1000 milliLiter(s) (100 mL/Hr) IV Continuous <Continuous>  dextrose 5%. 1000 milliLiter(s) (50 mL/Hr) IV Continuous <Continuous>  dextrose 50% Injectable 25 Gram(s) IV Push once  dextrose 50% Injectable 12.5 Gram(s) IV Push once  dextrose 50% Injectable 25 Gram(s) IV Push once  furosemide    Tablet 40 milliGRAM(s) Oral daily  gabapentin 100 milliGRAM(s) Oral every 8 hours  glucagon  Injectable 1 milliGRAM(s) IntraMuscular once  glucagon  Injectable 1 milliGRAM(s) IntraMuscular once  heparin   Injectable 5000 Unit(s) SubCutaneous every 8 hours  insulin glargine Injectable (LANTUS) 10 Unit(s) SubCutaneous at bedtime  insulin lispro (ADMELOG) corrective regimen sliding scale   SubCutaneous three times a day before meals  insulin lispro Injectable (ADMELOG) 3 Unit(s) SubCutaneous three times a day before meals  methylPREDNISolone 24 milliGRAM(s) Oral once  methylPREDNISolone   Oral   methylPREDNISolone 4 milliGRAM(s) Oral after lunch  methylPREDNISolone 4 milliGRAM(s) Oral after dinner  methylPREDNISolone 4 milliGRAM(s) Oral before breakfast  methylPREDNISolone 8 milliGRAM(s) Oral at bedtime  midodrine 5 milliGRAM(s) Oral every 8 hours  mupirocin 2% Ointment 1 Application(s) Both Nostrils two times a day  pantoprazole    Tablet 40 milliGRAM(s) Oral daily  polyethylene glycol 3350 17 Gram(s) Oral daily  potassium chloride    Tablet ER 20 milliEquivalent(s) Oral daily  senna 2 Tablet(s) Oral at bedtime  sodium chloride 0.9% lock flush 3 milliLiter(s) IV Push every 8 hours  tamsulosin 0.4 milliGRAM(s) Oral at bedtime    MEDICATIONS  (PRN):  acetaminophen     Tablet .. 650 milliGRAM(s) Oral every 6 hours PRN Mild Pain (1 - 3)  dextrose Oral Gel 15 Gram(s) Oral once PRN Blood Glucose LESS THAN 70 milliGRAM(s)/deciliter  oxyCODONE    IR 5 milliGRAM(s) Oral every 6 hours PRN Moderate Pain (4 - 6)

## 2024-10-27 NOTE — PROGRESS NOTE ADULT - SUBJECTIVE AND OBJECTIVE BOX
Patient is a 58y old  Male who presents with a chief complaint of Mitral Valve Stenosis (26 Oct 2024 13:04)    INTERVAL EVENTS: NAEON    SUBJECTIVE:  Patient was seen and examined at bedside. Wife at bedside. OOBTC, pt reports he is feeling better and reports he has tape allergy and showed me the blisters he as on his back. d/w nursing, pt on bacitracin ointment.     Review of systems: No fever, chills, dizziness, HA, Changes in vision, CP, dyspnea, nausea or vomiting, dysuria, changes in bowel movements, LE edema. Rest of 12 point Review of systems negative unless otherwise documented elsewhere in note.     Diet, Consistent Carbohydrate/No Snacks:   DASH/TLC Sodium & Cholesterol Restricted (DASH) (10-23-24 @ 14:46) [Active]      MEDICATIONS:  MEDICATIONS  (STANDING):  ascorbic acid 500 milliGRAM(s) Oral two times a day  aspirin enteric coated 81 milliGRAM(s) Oral daily  atorvastatin 10 milliGRAM(s) Oral at bedtime  bacitracin   Ointment 1 Application(s) Topical daily  chlorhexidine 2% Cloths 1 Application(s) Topical daily  dextrose 5%. 1000 milliLiter(s) (50 mL/Hr) IV Continuous <Continuous>  dextrose 5%. 1000 milliLiter(s) (50 mL/Hr) IV Continuous <Continuous>  dextrose 5%. 1000 milliLiter(s) (100 mL/Hr) IV Continuous <Continuous>  dextrose 5%. 1000 milliLiter(s) (100 mL/Hr) IV Continuous <Continuous>  dextrose 50% Injectable 12.5 Gram(s) IV Push once  dextrose 50% Injectable 25 Gram(s) IV Push once  dextrose 50% Injectable 25 Gram(s) IV Push once  furosemide    Tablet 40 milliGRAM(s) Oral daily  gabapentin 100 milliGRAM(s) Oral every 8 hours  glucagon  Injectable 1 milliGRAM(s) IntraMuscular once  glucagon  Injectable 1 milliGRAM(s) IntraMuscular once  heparin   Injectable 5000 Unit(s) SubCutaneous every 8 hours  insulin glargine Injectable (LANTUS) 10 Unit(s) SubCutaneous at bedtime  insulin lispro (ADMELOG) corrective regimen sliding scale   SubCutaneous three times a day before meals  insulin lispro Injectable (ADMELOG) 3 Unit(s) SubCutaneous three times a day before meals  methylPREDNISolone 4 milliGRAM(s) Oral before breakfast  methylPREDNISolone 8 milliGRAM(s) Oral at bedtime  methylPREDNISolone 24 milliGRAM(s) Oral once  methylPREDNISolone   Oral   methylPREDNISolone 4 milliGRAM(s) Oral after lunch  methylPREDNISolone 4 milliGRAM(s) Oral after dinner  midodrine 10 milliGRAM(s) Oral every 8 hours  mupirocin 2% Ointment 1 Application(s) Both Nostrils two times a day  pantoprazole    Tablet 40 milliGRAM(s) Oral daily  polyethylene glycol 3350 17 Gram(s) Oral daily  potassium chloride    Tablet ER 20 milliEquivalent(s) Oral daily  senna 2 Tablet(s) Oral at bedtime  sodium chloride 0.9% lock flush 3 milliLiter(s) IV Push every 8 hours  tamsulosin 0.4 milliGRAM(s) Oral at bedtime    MEDICATIONS  (PRN):  acetaminophen     Tablet .. 650 milliGRAM(s) Oral every 6 hours PRN Mild Pain (1 - 3)  dextrose Oral Gel 15 Gram(s) Oral once PRN Blood Glucose LESS THAN 70 milliGRAM(s)/deciliter  oxyCODONE    IR 5 milliGRAM(s) Oral every 6 hours PRN Moderate Pain (4 - 6)      Allergies    latex (Other; Rash)  No Known Drug Allergies  adhesives (Blisters)    Intolerances        OBJECTIVE:  Vital Signs Last 24 Hrs  T(C): 36.7 (27 Oct 2024 09:26), Max: 37.3 (26 Oct 2024 21:16)  T(F): 98 (27 Oct 2024 09:26), Max: 99.1 (26 Oct 2024 21:16)  HR: 113 (27 Oct 2024 08:30) (80 - 113)  BP: 100/57 (27 Oct 2024 08:30) (100/57 - 111/65)  BP(mean): 72 (27 Oct 2024 08:30) (72 - 83)  RR: 16 (27 Oct 2024 08:30) (16 - 28)  SpO2: 95% (27 Oct 2024 08:30) (91% - 98%)    Parameters below as of 27 Oct 2024 08:30  Patient On (Oxygen Delivery Method): room air      I&O's Summary    26 Oct 2024 07:01  -  27 Oct 2024 07:00  --------------------------------------------------------  IN: 857 mL / OUT: 1635 mL / NET: -778 mL    27 Oct 2024 07:01  -  27 Oct 2024 10:33  --------------------------------------------------------  IN: 0 mL / OUT: 0 mL / NET: 0 mL        PHYSICAL EXAM:  Gen: Reclining in bed at time of exam, appears stated age  HEENT: NCAT, MMM, clear OP  Neck: supple, trachea at midline  CV: RRR, +S1/S2  Pulm: adequate respiratory effort, no increase in work of breathing, R CT in place   Abd: soft, NTND  Skin: warm and dry, 2 ~ 1cm blisters noted on R lateral chest wall, one of them rupture with denuded skin. and a linear skin lesion on L lateral chest wall  Ext: WWP, no LE edema  Neuro: AOx3, no gross focal neurological deficits  Psych: affect and behavior appropriate, pleasant at time of interview  :     LABS:                        12.1   6.96  )-----------( 141      ( 27 Oct 2024 05:30 )             37.0     10-27    138  |  100  |  12  ----------------------------<  178[H]  4.1   |  27  |  0.79    Ca    9.1      27 Oct 2024 05:30  Mg     2.1     10-27          CAPILLARY BLOOD GLUCOSE      POCT Blood Glucose.: 167 mg/dL (27 Oct 2024 07:01)  POCT Blood Glucose.: 199 mg/dL (26 Oct 2024 21:07)  POCT Blood Glucose.: 200 mg/dL (26 Oct 2024 16:58)  POCT Blood Glucose.: 210 mg/dL (26 Oct 2024 11:44)    Urinalysis Basic - ( 27 Oct 2024 05:30 )    Color: x / Appearance: x / SG: x / pH: x  Gluc: 178 mg/dL / Ketone: x  / Bili: x / Urobili: x   Blood: x / Protein: x / Nitrite: x   Leuk Esterase: x / RBC: x / WBC x   Sq Epi: x / Non Sq Epi: x / Bacteria: x        MICRODATA:    Urinalysis with Rflx Culture (collected 25 Oct 2024 23:43)        RADIOLOGY/OTHER STUDIES:

## 2024-10-28 ENCOUNTER — RESULT REVIEW (OUTPATIENT)
Age: 59
End: 2024-10-28

## 2024-10-28 LAB
ANION GAP SERPL CALC-SCNC: 12 MMOL/L — SIGNIFICANT CHANGE UP (ref 5–17)
BUN SERPL-MCNC: 14 MG/DL — SIGNIFICANT CHANGE UP (ref 7–23)
CALCIUM SERPL-MCNC: 8.9 MG/DL — SIGNIFICANT CHANGE UP (ref 8.4–10.5)
CHLORIDE SERPL-SCNC: 104 MMOL/L — SIGNIFICANT CHANGE UP (ref 96–108)
CO2 SERPL-SCNC: 23 MMOL/L — SIGNIFICANT CHANGE UP (ref 22–31)
CREAT SERPL-MCNC: 0.69 MG/DL — SIGNIFICANT CHANGE UP (ref 0.5–1.3)
EGFR: 108 ML/MIN/1.73M2 — SIGNIFICANT CHANGE UP
GLUCOSE BLDC GLUCOMTR-MCNC: 200 MG/DL — HIGH (ref 70–99)
GLUCOSE BLDC GLUCOMTR-MCNC: 209 MG/DL — HIGH (ref 70–99)
GLUCOSE BLDC GLUCOMTR-MCNC: 214 MG/DL — HIGH (ref 70–99)
GLUCOSE BLDC GLUCOMTR-MCNC: 332 MG/DL — HIGH (ref 70–99)
GLUCOSE SERPL-MCNC: 209 MG/DL — HIGH (ref 70–99)
HCT VFR BLD CALC: 33 % — LOW (ref 39–50)
HCT VFR BLD CALC: 33.3 % — LOW (ref 39–50)
HGB BLD-MCNC: 10.8 G/DL — LOW (ref 13–17)
HGB BLD-MCNC: 10.9 G/DL — LOW (ref 13–17)
MAGNESIUM SERPL-MCNC: 2.2 MG/DL — SIGNIFICANT CHANGE UP (ref 1.6–2.6)
MCHC RBC-ENTMCNC: 31.6 PG — SIGNIFICANT CHANGE UP (ref 27–34)
MCHC RBC-ENTMCNC: 32.2 PG — SIGNIFICANT CHANGE UP (ref 27–34)
MCHC RBC-ENTMCNC: 32.4 GM/DL — SIGNIFICANT CHANGE UP (ref 32–36)
MCHC RBC-ENTMCNC: 33 GM/DL — SIGNIFICANT CHANGE UP (ref 32–36)
MCV RBC AUTO: 97.3 FL — SIGNIFICANT CHANGE UP (ref 80–100)
MCV RBC AUTO: 97.4 FL — SIGNIFICANT CHANGE UP (ref 80–100)
NRBC # BLD: 0 /100 WBCS — SIGNIFICANT CHANGE UP (ref 0–0)
NRBC # BLD: 0 /100 WBCS — SIGNIFICANT CHANGE UP (ref 0–0)
PLATELET # BLD AUTO: 169 K/UL — SIGNIFICANT CHANGE UP (ref 150–400)
PLATELET # BLD AUTO: 186 K/UL — SIGNIFICANT CHANGE UP (ref 150–400)
POTASSIUM SERPL-MCNC: 4.2 MMOL/L — SIGNIFICANT CHANGE UP (ref 3.5–5.3)
POTASSIUM SERPL-SCNC: 4.2 MMOL/L — SIGNIFICANT CHANGE UP (ref 3.5–5.3)
RBC # BLD: 3.39 M/UL — LOW (ref 4.2–5.8)
RBC # BLD: 3.42 M/UL — LOW (ref 4.2–5.8)
RBC # FLD: 13.8 % — SIGNIFICANT CHANGE UP (ref 10.3–14.5)
RBC # FLD: 14.1 % — SIGNIFICANT CHANGE UP (ref 10.3–14.5)
SODIUM SERPL-SCNC: 139 MMOL/L — SIGNIFICANT CHANGE UP (ref 135–145)
WBC # BLD: 6.82 K/UL — SIGNIFICANT CHANGE UP (ref 3.8–10.5)
WBC # BLD: 8.93 K/UL — SIGNIFICANT CHANGE UP (ref 3.8–10.5)
WBC # FLD AUTO: 6.82 K/UL — SIGNIFICANT CHANGE UP (ref 3.8–10.5)
WBC # FLD AUTO: 8.93 K/UL — SIGNIFICANT CHANGE UP (ref 3.8–10.5)

## 2024-10-28 PROCEDURE — 93306 TTE W/DOPPLER COMPLETE: CPT | Mod: 26

## 2024-10-28 PROCEDURE — 99232 SBSQ HOSP IP/OBS MODERATE 35: CPT

## 2024-10-28 PROCEDURE — 71046 X-RAY EXAM CHEST 2 VIEWS: CPT | Mod: 26

## 2024-10-28 RX ORDER — FUROSEMIDE 40 MG
40 TABLET ORAL ONCE
Refills: 0 | Status: COMPLETED | OUTPATIENT
Start: 2024-10-28 | End: 2024-10-28

## 2024-10-28 RX ORDER — METOPROLOL TARTRATE 50 MG
12.5 TABLET ORAL EVERY 12 HOURS
Refills: 0 | Status: DISCONTINUED | OUTPATIENT
Start: 2024-10-28 | End: 2024-10-28

## 2024-10-28 RX ORDER — METOPROLOL TARTRATE 50 MG
12.5 TABLET ORAL EVERY 12 HOURS
Refills: 0 | Status: DISCONTINUED | OUTPATIENT
Start: 2024-10-28 | End: 2024-10-29

## 2024-10-28 RX ORDER — ALBUMIN HUMAN 50 G/1000ML
250 SOLUTION INTRAVENOUS ONCE
Refills: 0 | Status: COMPLETED | OUTPATIENT
Start: 2024-10-28 | End: 2024-10-28

## 2024-10-28 RX ORDER — POTASSIUM CHLORIDE 10 MEQ
20 TABLET, EXTENDED RELEASE ORAL ONCE
Refills: 0 | Status: COMPLETED | OUTPATIENT
Start: 2024-10-28 | End: 2024-10-28

## 2024-10-28 RX ADMIN — METHYLPREDNISOLONE ACETATE 4 MILLIGRAM(S): 80 INJECTION, SUSPENSION INTRALESIONAL; INTRAMUSCULAR; INTRASYNOVIAL; SOFT TISSUE at 13:11

## 2024-10-28 RX ADMIN — SODIUM CHLORIDE 3 MILLILITER(S): 9 INJECTION, SOLUTION INTRAMUSCULAR; INTRAVENOUS; SUBCUTANEOUS at 22:44

## 2024-10-28 RX ADMIN — Medication 20 MILLIEQUIVALENT(S): at 13:47

## 2024-10-28 RX ADMIN — Medication 0.4 MILLIGRAM(S): at 22:13

## 2024-10-28 RX ADMIN — Medication 650 MILLIGRAM(S): at 10:43

## 2024-10-28 RX ADMIN — Medication 8: at 11:55

## 2024-10-28 RX ADMIN — Medication 2 TABLET(S): at 22:13

## 2024-10-28 RX ADMIN — ALBUMIN HUMAN 125 MILLILITER(S): 50 SOLUTION INTRAVENOUS at 15:45

## 2024-10-28 RX ADMIN — SODIUM CHLORIDE 3 MILLILITER(S): 9 INJECTION, SOLUTION INTRAMUSCULAR; INTRAVENOUS; SUBCUTANEOUS at 13:04

## 2024-10-28 RX ADMIN — Medication 12.5 MILLIGRAM(S): at 18:13

## 2024-10-28 RX ADMIN — HEPARIN SODIUM 5000 UNIT(S): 10000 INJECTION INTRAVENOUS; SUBCUTANEOUS at 13:11

## 2024-10-28 RX ADMIN — Medication 10 MILLIGRAM(S): at 22:14

## 2024-10-28 RX ADMIN — Medication 3 UNIT(S): at 08:09

## 2024-10-28 RX ADMIN — Medication 40 MILLIGRAM(S): at 13:47

## 2024-10-28 RX ADMIN — METHYLPREDNISOLONE ACETATE 4 MILLIGRAM(S): 80 INJECTION, SUSPENSION INTRALESIONAL; INTRAMUSCULAR; INTRASYNOVIAL; SOFT TISSUE at 18:09

## 2024-10-28 RX ADMIN — CHLORHEXIDINE GLUCONATE 1 APPLICATION(S): 40 SOLUTION TOPICAL at 06:06

## 2024-10-28 RX ADMIN — Medication 3 UNIT(S): at 16:51

## 2024-10-28 RX ADMIN — HEPARIN SODIUM 5000 UNIT(S): 10000 INJECTION INTRAVENOUS; SUBCUTANEOUS at 22:13

## 2024-10-28 RX ADMIN — Medication 20 MILLIEQUIVALENT(S): at 11:54

## 2024-10-28 RX ADMIN — Medication 500 MILLIGRAM(S): at 06:06

## 2024-10-28 RX ADMIN — METHYLPREDNISOLONE ACETATE 4 MILLIGRAM(S): 80 INJECTION, SUSPENSION INTRALESIONAL; INTRAMUSCULAR; INTRASYNOVIAL; SOFT TISSUE at 06:52

## 2024-10-28 RX ADMIN — Medication 650 MILLIGRAM(S): at 17:12

## 2024-10-28 RX ADMIN — MUPIROCIN 1 APPLICATION(S): 20 OINTMENT TOPICAL at 06:52

## 2024-10-28 RX ADMIN — Medication 4: at 16:52

## 2024-10-28 RX ADMIN — Medication 12.5 MILLIGRAM(S): at 12:17

## 2024-10-28 RX ADMIN — Medication 2: at 08:09

## 2024-10-28 RX ADMIN — Medication 40 MILLIGRAM(S): at 06:06

## 2024-10-28 RX ADMIN — SODIUM CHLORIDE 3 MILLILITER(S): 9 INJECTION, SOLUTION INTRAMUSCULAR; INTRAVENOUS; SUBCUTANEOUS at 06:49

## 2024-10-28 RX ADMIN — FIRST AID ANTIBIOTIC 1 APPLICATION(S): 500 OINTMENT TOPICAL at 12:11

## 2024-10-28 RX ADMIN — Medication 81 MILLIGRAM(S): at 11:54

## 2024-10-28 RX ADMIN — Medication 10 UNIT(S): at 22:12

## 2024-10-28 RX ADMIN — METHYLPREDNISOLONE ACETATE 4 MILLIGRAM(S): 80 INJECTION, SUSPENSION INTRALESIONAL; INTRAMUSCULAR; INTRASYNOVIAL; SOFT TISSUE at 22:14

## 2024-10-28 RX ADMIN — Medication 650 MILLIGRAM(S): at 16:12

## 2024-10-28 RX ADMIN — HEPARIN SODIUM 5000 UNIT(S): 10000 INJECTION INTRAVENOUS; SUBCUTANEOUS at 06:06

## 2024-10-28 RX ADMIN — Medication 650 MILLIGRAM(S): at 09:43

## 2024-10-28 RX ADMIN — PANTOPRAZOLE SODIUM 40 MILLIGRAM(S): 40 TABLET, DELAYED RELEASE ORAL at 06:52

## 2024-10-28 RX ADMIN — Medication 3 UNIT(S): at 11:55

## 2024-10-28 RX ADMIN — GABAPENTIN 100 MILLIGRAM(S): 300 CAPSULE ORAL at 06:06

## 2024-10-28 RX ADMIN — Medication 4: at 22:12

## 2024-10-28 NOTE — DIETITIAN INITIAL EVALUATION ADULT - OTHER CALCULATIONS
pounds, %IBW 93%  Pt within % IBW thus actual body weight used for all calculations. Needs adjusted for age, post-op healing.

## 2024-10-28 NOTE — PROGRESS NOTE ADULT - SUBJECTIVE AND OBJECTIVE BOX
Patient discussed on morning rounds with Dr. Hernandez    Operation / Date:   S/p MV Replacement, EF 45% (10/23/24)     SUBJECTIVE ASSESSMENT:  58y Male without complaints today aside from feeling "tired" with same blistering on his back from yesterday. Denies fever, chills, chest pain, sob, abd pain, n/v/d.     Vital Signs Last 24 Hrs  T(C): 36.6 (28 Oct 2024 09:12), Max: 37.1 (28 Oct 2024 00:52)  T(F): 97.9 (28 Oct 2024 09:12), Max: 98.7 (28 Oct 2024 00:52)  HR: 88 (28 Oct 2024 12:10) (74 - 93)  BP: 107/72 (28 Oct 2024 11:58) (97/55 - 111/59)  BP(mean): 84 (28 Oct 2024 11:58) (71 - 84)  RR: 18 (28 Oct 2024 11:58) (16 - 20)  SpO2: 96% (28 Oct 2024 12:10) (94% - 98%)    Parameters below as of 28 Oct 2024 11:58  Patient On (Oxygen Delivery Method): room air      I&O's Detail    27 Oct 2024 07:01  -  28 Oct 2024 07:00  --------------------------------------------------------  IN:  Total IN: 0 mL    OUT:    Chest Tube (mL): 20 mL    Voided (mL): 2100 mL  Total OUT: 2120 mL    Total NET: -2120 mL      28 Oct 2024 07:01  -  28 Oct 2024 13:40  --------------------------------------------------------  IN:    Oral Fluid: 560 mL  Total IN: 560 mL    OUT:    Chest Tube (mL): 40 mL    Voided (mL): 750 mL  Total OUT: 790 mL    Total NET: -230 mL    CHEST TUBE:  No.    TR DRAIN:  No.  EPICARDIAL WIRES: No.  TIE DOWNS: Yes  ARCE: No.    PHYSICAL EXAM:  Appearance: No acute distress.  Neurologic: AAOx3, no AMS or focal deficits.  Responds appropriately to verbal and physical stimuli; exhibits purposeful movement in all extremities.   Cardiovascular: +murmur. RRR, S1 S2. No r/g.  Respiratory: No acute respiratory distress. CTA b/l, no w/r/r.   Gastrointestinal:  Soft, non-tender, non-distended, + BS.	  Skin: +blister to R posterior shoulder no erythema. No rashes. No ecchymoses. No cyanosis.  Extremities: Exhibits normal range of motion, no clubbing, cyanosis or edema.  Vascular: Peripheral pulses palpable 2+ bilaterally.  Incision Sites: +R chest incisions well approximated, +ecchymosis to the R chest, R groin incision well approximated.     LABS:                        10.8   6.82  )-----------( 169      ( 28 Oct 2024 05:30 )             33.3     10-28    139  |  104  |  14  ----------------------------<  209[H]  4.2   |  23  |  0.69    Ca    8.9      28 Oct 2024 05:30  Mg     2.2     10-28        Urinalysis Basic - ( 28 Oct 2024 05:30 )    Color: x / Appearance: x / SG: x / pH: x  Gluc: 209 mg/dL / Ketone: x  / Bili: x / Urobili: x   Blood: x / Protein: x / Nitrite: x   Leuk Esterase: x / RBC: x / WBC x   Sq Epi: x / Non Sq Epi: x / Bacteria: x        MEDICATIONS  (STANDING):  aspirin enteric coated 81 milliGRAM(s) Oral daily  atorvastatin 10 milliGRAM(s) Oral at bedtime  bacitracin   Ointment 1 Application(s) Topical daily  chlorhexidine 2% Cloths 1 Application(s) Topical daily  dextrose 5%. 1000 milliLiter(s) (50 mL/Hr) IV Continuous <Continuous>  dextrose 5%. 1000 milliLiter(s) (100 mL/Hr) IV Continuous <Continuous>  dextrose 5%. 1000 milliLiter(s) (100 mL/Hr) IV Continuous <Continuous>  dextrose 5%. 1000 milliLiter(s) (50 mL/Hr) IV Continuous <Continuous>  dextrose 50% Injectable 25 Gram(s) IV Push once  dextrose 50% Injectable 12.5 Gram(s) IV Push once  dextrose 50% Injectable 25 Gram(s) IV Push once  furosemide    Tablet 40 milliGRAM(s) Oral daily  furosemide    Tablet 40 milliGRAM(s) Oral once  glucagon  Injectable 1 milliGRAM(s) IntraMuscular once  glucagon  Injectable 1 milliGRAM(s) IntraMuscular once  heparin   Injectable 5000 Unit(s) SubCutaneous every 8 hours  insulin glargine Injectable (LANTUS) 10 Unit(s) SubCutaneous at bedtime  insulin lispro (ADMELOG) corrective regimen sliding scale   SubCutaneous three times a day before meals  insulin lispro Injectable (ADMELOG) 3 Unit(s) SubCutaneous three times a day before meals  methylPREDNISolone 24 milliGRAM(s) Oral once  methylPREDNISolone   Oral   methylPREDNISolone 4 milliGRAM(s) Oral after lunch  methylPREDNISolone 4 milliGRAM(s) Oral after dinner  methylPREDNISolone 4 milliGRAM(s) Oral at bedtime  methylPREDNISolone 4 milliGRAM(s) Oral before breakfast  metoprolol tartrate 12.5 milliGRAM(s) Oral every 12 hours  pantoprazole    Tablet 40 milliGRAM(s) Oral daily  polyethylene glycol 3350 17 Gram(s) Oral daily  potassium chloride    Tablet ER 20 milliEquivalent(s) Oral daily  potassium chloride    Tablet ER 20 milliEquivalent(s) Oral once  senna 2 Tablet(s) Oral at bedtime  sodium chloride 0.9% lock flush 3 milliLiter(s) IV Push every 8 hours  tamsulosin 0.4 milliGRAM(s) Oral at bedtime    MEDICATIONS  (PRN):  acetaminophen     Tablet .. 650 milliGRAM(s) Oral every 6 hours PRN Mild Pain (1 - 3)  dextrose Oral Gel 15 Gram(s) Oral once PRN Blood Glucose LESS THAN 70 milliGRAM(s)/deciliter  oxyCODONE    IR 5 milliGRAM(s) Oral every 6 hours PRN Moderate Pain (4 - 6)        RADIOLOGY & ADDITIONAL TESTS:  < from: Xray Chest 2 Views PA/Lat (10.27.24 @ 17:53) >  Right basilar chest tube, unchanged from prior. Sternotomy wires. Mitral   valve replacement.    Stable cardiomediastinal silhouette. No significant changes in lung   fields from prior. Unchanged small right apical pneumothorax. Increased   small right pleural effusion. Trace left pleural effusion. No acute   abnormalities of the soft tissues and osseousstructures.    < end of copied text >

## 2024-10-28 NOTE — DIETITIAN INITIAL EVALUATION ADULT - PERSON TAUGHT/METHOD
RD provided in-depth diet education on consistent carbohydrate diet including CHO examples, role of protein and fiber. Handout provided. Pt and wife verbalized appreciation and understanding./verbal instruction/written material/patient instructed/spouse instructed

## 2024-10-28 NOTE — DIETITIAN INITIAL EVALUATION ADULT - PERTINENT LABORATORY DATA
10-28    139  |  104  |  14  ----------------------------<  209[H]  4.2   |  23  |  0.69    Ca    8.9      28 Oct 2024 05:30  Mg     2.2     10-28    POCT Blood Glucose.: 332 mg/dL (10-28-24 @ 11:11)  A1C with Estimated Average Glucose Result: 6.7 % (10-26-24 @ 06:38)  A1C with Estimated Average Glucose Result: 7.1 % (10-01-24 @ 14:59)  A1C with Estimated Average Glucose Result: 7.1 % (09-25-24 @ 07:54)

## 2024-10-28 NOTE — DIETITIAN INITIAL EVALUATION ADULT - OTHER INFO
57 yo male with PMHx HTN, HLD, DM-2, mild CAD and mitral valve prolapse s/p MV repair w/28mm Jaymie ring in 2011 @ [facility] presents with severe mitral valve stenosis. Patient presented to his cardiologist, Dr. Vazquez, with c/o intermittent substernal chest pressure, non-radiating, occurring upon moderate physical exertion, which improves w/ rest, that has been gradually worsening over the past few weeks. S/p MV replacement 10/23    Patient seen for nutrition assessment, wife at bedside. Current diet order: DASH/TLC, Consistent Carbohydrate. Pt endorses good appetite and intake at present, consumed 100% of breakfast which consisted of oatmeal, yogurt, eggs, and pancakes. PTA, pt endorses good appetite and intake at baseline with usual body weight of ~138-141 pounds. Pt denies recent weight changes. Current dosing weight: 137 pounds, BMI 21.4. Observed with no signs or symptoms of muscle or fat wasting. Based on ASPEN guidelines, pt does not meet criteria for malnutrition. Labs and medications reviewed. Electrolytes WNL, POC glucose 167-332 x 24 hours<H>, HgbA1c 6.7%<H> (10/26/23). Ordered for 10U LANTUS at bedtime, 3U premeal ADMELOG TID, insulin sliding scale, furosemide, potassium chloride, miralax, senna, protonix. Skin: No pressure injuries documented, noted with surgical incisions to R lateral chest and R groin, +1 generalized edema and +2 edema to BL ankle. GI: No report of nausea, vomiting, diarrhea, constipation; last BM 10/27 per flowsheets. Confirms no known food allergies. No difficulty chewing/swallowing reported. See nutrition recommendations below.

## 2024-10-28 NOTE — PROGRESS NOTE ADULT - SUBJECTIVE AND OBJECTIVE BOX
Patient is a 58y old  Male who presents with a chief complaint of Mitral Valve Stenosis (27 Oct 2024 16:43)    INTERVAL EVENTS: NAEON    SUBJECTIVE:  Patient was seen and examined at bedside. No complaints. Noted TTE w. high transvalvular gradient 16-22mmHg, plan for MARLEEN tomorrow     Review of systems: No fever, chills, dizziness, HA, Changes in vision, CP, dyspnea, nausea or vomiting, dysuria, changes in bowel movements, LE edema. Rest of 12 point Review of systems negative unless otherwise documented elsewhere in note.     Diet, NPO after Midnight:      NPO Start Date: 28-Oct-2024,   NPO Start Time: 23:59  Except Medications (10-28-24 @ 10:50) [Active]  Diet, Consistent Carbohydrate/No Snacks:   DASH/TLC Sodium & Cholesterol Restricted (DASH) (10-23-24 @ 14:46) [Active]      MEDICATIONS:  MEDICATIONS  (STANDING):  aspirin enteric coated 81 milliGRAM(s) Oral daily  atorvastatin 10 milliGRAM(s) Oral at bedtime  bacitracin   Ointment 1 Application(s) Topical daily  chlorhexidine 2% Cloths 1 Application(s) Topical daily  dextrose 5%. 1000 milliLiter(s) (50 mL/Hr) IV Continuous <Continuous>  dextrose 5%. 1000 milliLiter(s) (50 mL/Hr) IV Continuous <Continuous>  dextrose 5%. 1000 milliLiter(s) (100 mL/Hr) IV Continuous <Continuous>  dextrose 5%. 1000 milliLiter(s) (100 mL/Hr) IV Continuous <Continuous>  dextrose 50% Injectable 25 Gram(s) IV Push once  dextrose 50% Injectable 25 Gram(s) IV Push once  dextrose 50% Injectable 12.5 Gram(s) IV Push once  furosemide    Tablet 40 milliGRAM(s) Oral daily  glucagon  Injectable 1 milliGRAM(s) IntraMuscular once  glucagon  Injectable 1 milliGRAM(s) IntraMuscular once  heparin   Injectable 5000 Unit(s) SubCutaneous every 8 hours  insulin glargine Injectable (LANTUS) 10 Unit(s) SubCutaneous at bedtime  insulin lispro (ADMELOG) corrective regimen sliding scale   SubCutaneous three times a day before meals  insulin lispro Injectable (ADMELOG) 3 Unit(s) SubCutaneous three times a day before meals  methylPREDNISolone 4 milliGRAM(s) Oral before breakfast  methylPREDNISolone 4 milliGRAM(s) Oral at bedtime  methylPREDNISolone 24 milliGRAM(s) Oral once  methylPREDNISolone   Oral   methylPREDNISolone 4 milliGRAM(s) Oral after lunch  methylPREDNISolone 4 milliGRAM(s) Oral after dinner  metoprolol tartrate 12.5 milliGRAM(s) Oral every 12 hours  pantoprazole    Tablet 40 milliGRAM(s) Oral daily  polyethylene glycol 3350 17 Gram(s) Oral daily  potassium chloride    Tablet ER 20 milliEquivalent(s) Oral daily  senna 2 Tablet(s) Oral at bedtime  sodium chloride 0.9% lock flush 3 milliLiter(s) IV Push every 8 hours  tamsulosin 0.4 milliGRAM(s) Oral at bedtime    MEDICATIONS  (PRN):  acetaminophen     Tablet .. 650 milliGRAM(s) Oral every 6 hours PRN Mild Pain (1 - 3)  dextrose Oral Gel 15 Gram(s) Oral once PRN Blood Glucose LESS THAN 70 milliGRAM(s)/deciliter  oxyCODONE    IR 5 milliGRAM(s) Oral every 6 hours PRN Moderate Pain (4 - 6)      Allergies    latex (Other; Rash)  No Known Drug Allergies  adhesives (Blisters)    Intolerances        OBJECTIVE:  Vital Signs Last 24 Hrs  T(C): 36.6 (28 Oct 2024 09:12), Max: 37.1 (28 Oct 2024 00:52)  T(F): 97.9 (28 Oct 2024 09:12), Max: 98.7 (28 Oct 2024 00:52)  HR: 88 (28 Oct 2024 12:10) (74 - 93)  BP: 107/72 (28 Oct 2024 11:58) (97/55 - 111/59)  BP(mean): 84 (28 Oct 2024 11:58) (71 - 84)  RR: 18 (28 Oct 2024 11:58) (16 - 20)  SpO2: 96% (28 Oct 2024 12:10) (94% - 98%)    Parameters below as of 28 Oct 2024 11:58  Patient On (Oxygen Delivery Method): room air      I&O's Summary    27 Oct 2024 07:01  -  28 Oct 2024 07:00  --------------------------------------------------------  IN: 0 mL / OUT: 2120 mL / NET: -2120 mL    28 Oct 2024 07:01  -  28 Oct 2024 13:05  --------------------------------------------------------  IN: 560 mL / OUT: 790 mL / NET: -230 mL        PHYSICAL EXAM:  Gen: Reclining in bed at time of exam, appears stated age  HEENT: NCAT, MMM, clear OP  Neck: supple, trachea at midline  CV: RRR, +S1/S2  Pulm: adequate respiratory effort, no increase in work of breathing  Abd: soft, NTND  Skin: warm and dry,   Ext: WWP, no LE edema  Neuro: AOx3, no gross focal neurological deficits  Psych: affect and behavior appropriate, pleasant at time of interview  :     LABS:                        10.8   6.82  )-----------( 169      ( 28 Oct 2024 05:30 )             33.3     10-28    139  |  104  |  14  ----------------------------<  209[H]  4.2   |  23  |  0.69    Ca    8.9      28 Oct 2024 05:30  Mg     2.2     10-28          CAPILLARY BLOOD GLUCOSE      POCT Blood Glucose.: 332 mg/dL (28 Oct 2024 11:11)  POCT Blood Glucose.: 200 mg/dL (28 Oct 2024 07:14)  POCT Blood Glucose.: 260 mg/dL (27 Oct 2024 22:16)  POCT Blood Glucose.: 234 mg/dL (27 Oct 2024 16:23)    Urinalysis Basic - ( 28 Oct 2024 05:30 )    Color: x / Appearance: x / SG: x / pH: x  Gluc: 209 mg/dL / Ketone: x  / Bili: x / Urobili: x   Blood: x / Protein: x / Nitrite: x   Leuk Esterase: x / RBC: x / WBC x   Sq Epi: x / Non Sq Epi: x / Bacteria: x        MICRODATA:    Urinalysis with Rflx Culture (collected 25 Oct 2024 23:43)        RADIOLOGY/OTHER STUDIES:    PCP  Pharmacy:   Emergency contact:

## 2024-10-28 NOTE — DIETITIAN INITIAL EVALUATION ADULT - [id number]_GEN_A_CORE
10-27-24 @ 07:01  -  10-28-24 @ 07:00  --------------------------------------------------------  OUT:    Chest Tube (mL): 20 mL  Total OUT: 20 mL    Total NET: -20 mL      10-28-24 @ 07:01  -  10-28-24 @ 13:44  --------------------------------------------------------  OUT:    Chest Tube (mL): 40 mL  Total OUT: 40 mL    Total NET: -40 mL

## 2024-10-28 NOTE — DIETITIAN INITIAL EVALUATION ADULT - ADD RECOMMEND
1. Continue DASH/TLC, Consistent Carbohydrate diet   2. Encourage and monitor PO intake, honor preferences as able   >> Consistently meet >75% of estimated needs during admission   >> Consider oral nutrition supplement or liberalizing diet should intake decline </= 50%   3. Monitor wt trends, GI function, skin integrity  4. Monitor lytes, renal indices, blood glucose, LFTs    5. Pain and bowel regimen per team

## 2024-10-28 NOTE — DIETITIAN INITIAL EVALUATION ADULT - PERTINENT MEDS FT
MEDICATIONS  (STANDING):  aspirin enteric coated 81 milliGRAM(s) Oral daily  atorvastatin 10 milliGRAM(s) Oral at bedtime  bacitracin   Ointment 1 Application(s) Topical daily  chlorhexidine 2% Cloths 1 Application(s) Topical daily  dextrose 5%. 1000 milliLiter(s) (50 mL/Hr) IV Continuous <Continuous>  dextrose 5%. 1000 milliLiter(s) (50 mL/Hr) IV Continuous <Continuous>  dextrose 5%. 1000 milliLiter(s) (100 mL/Hr) IV Continuous <Continuous>  dextrose 5%. 1000 milliLiter(s) (100 mL/Hr) IV Continuous <Continuous>  dextrose 50% Injectable 12.5 Gram(s) IV Push once  dextrose 50% Injectable 25 Gram(s) IV Push once  dextrose 50% Injectable 25 Gram(s) IV Push once  furosemide    Tablet 40 milliGRAM(s) Oral daily  furosemide    Tablet 40 milliGRAM(s) Oral once  glucagon  Injectable 1 milliGRAM(s) IntraMuscular once  glucagon  Injectable 1 milliGRAM(s) IntraMuscular once  heparin   Injectable 5000 Unit(s) SubCutaneous every 8 hours  insulin glargine Injectable (LANTUS) 10 Unit(s) SubCutaneous at bedtime  insulin lispro (ADMELOG) corrective regimen sliding scale   SubCutaneous three times a day before meals  insulin lispro Injectable (ADMELOG) 3 Unit(s) SubCutaneous three times a day before meals  methylPREDNISolone 4 milliGRAM(s) Oral before breakfast  methylPREDNISolone   Oral   methylPREDNISolone 4 milliGRAM(s) Oral after lunch  methylPREDNISolone 4 milliGRAM(s) Oral after dinner  methylPREDNISolone 4 milliGRAM(s) Oral at bedtime  methylPREDNISolone 24 milliGRAM(s) Oral once  metoprolol tartrate 12.5 milliGRAM(s) Oral every 12 hours  pantoprazole    Tablet 40 milliGRAM(s) Oral daily  polyethylene glycol 3350 17 Gram(s) Oral daily  potassium chloride    Tablet ER 20 milliEquivalent(s) Oral once  potassium chloride    Tablet ER 20 milliEquivalent(s) Oral daily  senna 2 Tablet(s) Oral at bedtime  sodium chloride 0.9% lock flush 3 milliLiter(s) IV Push every 8 hours  tamsulosin 0.4 milliGRAM(s) Oral at bedtime    MEDICATIONS  (PRN):  acetaminophen     Tablet .. 650 milliGRAM(s) Oral every 6 hours PRN Mild Pain (1 - 3)  dextrose Oral Gel 15 Gram(s) Oral once PRN Blood Glucose LESS THAN 70 milliGRAM(s)/deciliter  oxyCODONE    IR 5 milliGRAM(s) Oral every 6 hours PRN Moderate Pain (4 - 6)

## 2024-10-29 ENCOUNTER — RESULT REVIEW (OUTPATIENT)
Age: 59
End: 2024-10-29

## 2024-10-29 ENCOUNTER — NON-APPOINTMENT (OUTPATIENT)
Age: 59
End: 2024-10-29

## 2024-10-29 ENCOUNTER — TRANSCRIPTION ENCOUNTER (OUTPATIENT)
Age: 59
End: 2024-10-29

## 2024-10-29 VITALS — TEMPERATURE: 98 F

## 2024-10-29 LAB
ALBUMIN SERPL ELPH-MCNC: 4.2 G/DL — SIGNIFICANT CHANGE UP (ref 3.3–5)
ALP SERPL-CCNC: 159 U/L — HIGH (ref 40–120)
ALT FLD-CCNC: 88 U/L — HIGH (ref 10–45)
ANION GAP SERPL CALC-SCNC: 10 MMOL/L — SIGNIFICANT CHANGE UP (ref 5–17)
APTT BLD: 25.9 SEC — SIGNIFICANT CHANGE UP (ref 24.5–35.6)
AST SERPL-CCNC: 102 U/L — HIGH (ref 10–40)
BILIRUB SERPL-MCNC: 0.7 MG/DL — SIGNIFICANT CHANGE UP (ref 0.2–1.2)
BUN SERPL-MCNC: 18 MG/DL — SIGNIFICANT CHANGE UP (ref 7–23)
CALCIUM SERPL-MCNC: 9.4 MG/DL — SIGNIFICANT CHANGE UP (ref 8.4–10.5)
CHLORIDE SERPL-SCNC: 102 MMOL/L — SIGNIFICANT CHANGE UP (ref 96–108)
CO2 SERPL-SCNC: 28 MMOL/L — SIGNIFICANT CHANGE UP (ref 22–31)
CREAT SERPL-MCNC: 0.75 MG/DL — SIGNIFICANT CHANGE UP (ref 0.5–1.3)
EGFR: 105 ML/MIN/1.73M2 — SIGNIFICANT CHANGE UP
GLUCOSE BLDC GLUCOMTR-MCNC: 157 MG/DL — HIGH (ref 70–99)
GLUCOSE BLDC GLUCOMTR-MCNC: 160 MG/DL — HIGH (ref 70–99)
GLUCOSE BLDC GLUCOMTR-MCNC: 173 MG/DL — HIGH (ref 70–99)
GLUCOSE SERPL-MCNC: 172 MG/DL — HIGH (ref 70–99)
HCT VFR BLD CALC: 34.7 % — LOW (ref 39–50)
HGB BLD-MCNC: 11 G/DL — LOW (ref 13–17)
INR BLD: 0.89 — SIGNIFICANT CHANGE UP (ref 0.85–1.16)
MAGNESIUM SERPL-MCNC: 2.2 MG/DL — SIGNIFICANT CHANGE UP (ref 1.6–2.6)
MCHC RBC-ENTMCNC: 30.7 PG — SIGNIFICANT CHANGE UP (ref 27–34)
MCHC RBC-ENTMCNC: 31.7 GM/DL — LOW (ref 32–36)
MCV RBC AUTO: 96.9 FL — SIGNIFICANT CHANGE UP (ref 80–100)
NRBC # BLD: 0 /100 WBCS — SIGNIFICANT CHANGE UP (ref 0–0)
PLATELET # BLD AUTO: 224 K/UL — SIGNIFICANT CHANGE UP (ref 150–400)
POTASSIUM SERPL-MCNC: 4.2 MMOL/L — SIGNIFICANT CHANGE UP (ref 3.5–5.3)
POTASSIUM SERPL-SCNC: 4.2 MMOL/L — SIGNIFICANT CHANGE UP (ref 3.5–5.3)
PROT SERPL-MCNC: 7.8 G/DL — SIGNIFICANT CHANGE UP (ref 6–8.3)
PROTHROM AB SERPL-ACNC: 10.3 SEC — SIGNIFICANT CHANGE UP (ref 9.9–13.4)
RBC # BLD: 3.58 M/UL — LOW (ref 4.2–5.8)
RBC # FLD: 13.8 % — SIGNIFICANT CHANGE UP (ref 10.3–14.5)
SODIUM SERPL-SCNC: 140 MMOL/L — SIGNIFICANT CHANGE UP (ref 135–145)
WBC # BLD: 8.18 K/UL — SIGNIFICANT CHANGE UP (ref 3.8–10.5)
WBC # FLD AUTO: 8.18 K/UL — SIGNIFICANT CHANGE UP (ref 3.8–10.5)

## 2024-10-29 PROCEDURE — 97161 PT EVAL LOW COMPLEX 20 MIN: CPT

## 2024-10-29 PROCEDURE — C1889: CPT

## 2024-10-29 PROCEDURE — 82803 BLOOD GASES ANY COMBINATION: CPT

## 2024-10-29 PROCEDURE — C1751: CPT

## 2024-10-29 PROCEDURE — 82010 KETONE BODYS QUAN: CPT

## 2024-10-29 PROCEDURE — 80048 BASIC METABOLIC PNL TOTAL CA: CPT

## 2024-10-29 PROCEDURE — 85610 PROTHROMBIN TIME: CPT

## 2024-10-29 PROCEDURE — 97116 GAIT TRAINING THERAPY: CPT

## 2024-10-29 PROCEDURE — 85014 HEMATOCRIT: CPT

## 2024-10-29 PROCEDURE — 82962 GLUCOSE BLOOD TEST: CPT

## 2024-10-29 PROCEDURE — 84132 ASSAY OF SERUM POTASSIUM: CPT

## 2024-10-29 PROCEDURE — P9045: CPT

## 2024-10-29 PROCEDURE — 83735 ASSAY OF MAGNESIUM: CPT

## 2024-10-29 PROCEDURE — C9399: CPT

## 2024-10-29 PROCEDURE — S2900: CPT

## 2024-10-29 PROCEDURE — 82947 ASSAY GLUCOSE BLOOD QUANT: CPT

## 2024-10-29 PROCEDURE — 84100 ASSAY OF PHOSPHORUS: CPT

## 2024-10-29 PROCEDURE — 71045 X-RAY EXAM CHEST 1 VIEW: CPT | Mod: 26

## 2024-10-29 PROCEDURE — P9016: CPT

## 2024-10-29 PROCEDURE — 85730 THROMBOPLASTIN TIME PARTIAL: CPT

## 2024-10-29 PROCEDURE — 86850 RBC ANTIBODY SCREEN: CPT

## 2024-10-29 PROCEDURE — 82330 ASSAY OF CALCIUM: CPT

## 2024-10-29 PROCEDURE — 71046 X-RAY EXAM CHEST 2 VIEWS: CPT

## 2024-10-29 PROCEDURE — 85025 COMPLETE CBC W/AUTO DIFF WBC: CPT

## 2024-10-29 PROCEDURE — 36415 COLL VENOUS BLD VENIPUNCTURE: CPT

## 2024-10-29 PROCEDURE — 86891 AUTOLOGOUS BLOOD OP SALVAGE: CPT

## 2024-10-29 PROCEDURE — C1769: CPT

## 2024-10-29 PROCEDURE — 93312 ECHO TRANSESOPHAGEAL: CPT | Mod: 26

## 2024-10-29 PROCEDURE — 83605 ASSAY OF LACTIC ACID: CPT

## 2024-10-29 PROCEDURE — 88305 TISSUE EXAM BY PATHOLOGIST: CPT

## 2024-10-29 PROCEDURE — C8929: CPT

## 2024-10-29 PROCEDURE — 86901 BLOOD TYPING SEROLOGIC RH(D): CPT

## 2024-10-29 PROCEDURE — 85027 COMPLETE CBC AUTOMATED: CPT

## 2024-10-29 PROCEDURE — 71045 X-RAY EXAM CHEST 1 VIEW: CPT

## 2024-10-29 PROCEDURE — P9047: CPT

## 2024-10-29 PROCEDURE — 86923 COMPATIBILITY TEST ELECTRIC: CPT

## 2024-10-29 PROCEDURE — 83036 HEMOGLOBIN GLYCOSYLATED A1C: CPT

## 2024-10-29 PROCEDURE — 76377 3D RENDER W/INTRP POSTPROCES: CPT | Mod: 26

## 2024-10-29 PROCEDURE — 84295 ASSAY OF SERUM SODIUM: CPT

## 2024-10-29 PROCEDURE — C8925: CPT

## 2024-10-29 PROCEDURE — 81001 URINALYSIS AUTO W/SCOPE: CPT

## 2024-10-29 PROCEDURE — 99232 SBSQ HOSP IP/OBS MODERATE 35: CPT

## 2024-10-29 PROCEDURE — 84443 ASSAY THYROID STIM HORMONE: CPT

## 2024-10-29 PROCEDURE — 86900 BLOOD TYPING SEROLOGIC ABO: CPT

## 2024-10-29 PROCEDURE — 36430 TRANSFUSION BLD/BLD COMPNT: CPT

## 2024-10-29 PROCEDURE — 80053 COMPREHEN METABOLIC PANEL: CPT

## 2024-10-29 RX ORDER — FUROSEMIDE 40 MG
1 TABLET ORAL
Qty: 7 | Refills: 0
Start: 2024-10-29 | End: 2024-11-04

## 2024-10-29 RX ORDER — SIMVASTATIN 80 MG/1
1 TABLET, FILM COATED ORAL
Qty: 30 | Refills: 0
Start: 2024-10-29 | End: 2024-11-27

## 2024-10-29 RX ORDER — INSULIN LISPRO 100/ML
5 VIAL (ML) SUBCUTANEOUS
Refills: 0 | Status: DISCONTINUED | OUTPATIENT
Start: 2024-10-29 | End: 2024-10-29

## 2024-10-29 RX ORDER — TAMSULOSIN HCL 0.4 MG
1 CAPSULE ORAL
Qty: 30 | Refills: 0
Start: 2024-10-29 | End: 2024-11-27

## 2024-10-29 RX ORDER — METOPROLOL SUCCINATE 50 MG/1
1 TABLET, EXTENDED RELEASE ORAL
Qty: 30 | Refills: 0 | DISCHARGE
Start: 2024-10-29 | End: 2024-11-27

## 2024-10-29 RX ORDER — ASPIRIN/MAG CARB/ALUMINUM AMIN 325 MG
1 TABLET ORAL
Qty: 30 | Refills: 0
Start: 2024-10-29 | End: 2024-11-27

## 2024-10-29 RX ORDER — INSULIN LISPRO 100/ML
VIAL (ML) SUBCUTANEOUS
Refills: 0 | Status: DISCONTINUED | OUTPATIENT
Start: 2024-10-29 | End: 2024-10-29

## 2024-10-29 RX ORDER — INSULIN GLARGINE,HUM.REC.ANLOG 100/ML
12 VIAL (ML) SUBCUTANEOUS AT BEDTIME
Refills: 0 | Status: DISCONTINUED | OUTPATIENT
Start: 2024-10-29 | End: 2024-10-29

## 2024-10-29 RX ORDER — GLUCAGON INJECTION, SOLUTION 1 MG/.2ML
1 INJECTION, SOLUTION SUBCUTANEOUS ONCE
Refills: 0 | Status: DISCONTINUED | OUTPATIENT
Start: 2024-10-29 | End: 2024-10-29

## 2024-10-29 RX ORDER — POLYETHYLENE GLYCOL 3350 17 G/17G
17 POWDER, FOR SOLUTION ORAL
Qty: 238 | Refills: 0
Start: 2024-10-29 | End: 2024-11-11

## 2024-10-29 RX ORDER — SITAGLIPTIN 100 MG/1
1 TABLET, FILM COATED ORAL
Qty: 30 | Refills: 0
Start: 2024-10-29 | End: 2024-11-27

## 2024-10-29 RX ORDER — METFORMIN HYDROCHLORIDE 500 MG/1
1 TABLET, EXTENDED RELEASE ORAL
Qty: 60 | Refills: 0
Start: 2024-10-29 | End: 2024-11-27

## 2024-10-29 RX ORDER — POTASSIUM CHLORIDE 10 MEQ
1 TABLET, EXTENDED RELEASE ORAL
Qty: 7 | Refills: 0
Start: 2024-10-29 | End: 2024-11-04

## 2024-10-29 RX ORDER — CLOPIDOGREL 75 MG/1
1 TABLET ORAL
Qty: 30 | Refills: 0
Start: 2024-10-29 | End: 2024-11-27

## 2024-10-29 RX ORDER — METOPROLOL TARTRATE 50 MG
0.5 TABLET ORAL
Qty: 30 | Refills: 0
Start: 2024-10-29 | End: 2024-11-27

## 2024-10-29 RX ORDER — PANTOPRAZOLE SODIUM 40 MG/1
1 TABLET, DELAYED RELEASE ORAL
Qty: 30 | Refills: 0
Start: 2024-10-29 | End: 2024-11-27

## 2024-10-29 RX ORDER — METHYLPREDNISOLONE ACETATE 80 MG/ML
1 INJECTION, SUSPENSION INTRALESIONAL; INTRAMUSCULAR; INTRASYNOVIAL; SOFT TISSUE
Qty: 1 | Refills: 0
Start: 2024-10-29 | End: 2024-11-04

## 2024-10-29 RX ORDER — OXYCODONE HYDROCHLORIDE 30 MG/1
1 TABLET ORAL
Qty: 28 | Refills: 0
Start: 2024-10-29 | End: 2024-11-04

## 2024-10-29 RX ADMIN — METHYLPREDNISOLONE ACETATE 4 MILLIGRAM(S): 80 INJECTION, SUSPENSION INTRALESIONAL; INTRAMUSCULAR; INTRASYNOVIAL; SOFT TISSUE at 15:33

## 2024-10-29 RX ADMIN — CHLORHEXIDINE GLUCONATE 1 APPLICATION(S): 40 SOLUTION TOPICAL at 06:13

## 2024-10-29 RX ADMIN — METHYLPREDNISOLONE ACETATE 4 MILLIGRAM(S): 80 INJECTION, SUSPENSION INTRALESIONAL; INTRAMUSCULAR; INTRASYNOVIAL; SOFT TISSUE at 06:13

## 2024-10-29 RX ADMIN — Medication 2: at 07:05

## 2024-10-29 RX ADMIN — FIRST AID ANTIBIOTIC 1 APPLICATION(S): 500 OINTMENT TOPICAL at 13:02

## 2024-10-29 RX ADMIN — PANTOPRAZOLE SODIUM 40 MILLIGRAM(S): 40 TABLET, DELAYED RELEASE ORAL at 06:14

## 2024-10-29 RX ADMIN — OXYCODONE HYDROCHLORIDE 5 MILLIGRAM(S): 30 TABLET ORAL at 05:59

## 2024-10-29 RX ADMIN — Medication 40 MILLIGRAM(S): at 06:13

## 2024-10-29 RX ADMIN — OXYCODONE HYDROCHLORIDE 5 MILLIGRAM(S): 30 TABLET ORAL at 04:59

## 2024-10-29 RX ADMIN — Medication 2: at 12:53

## 2024-10-29 RX ADMIN — HEPARIN SODIUM 5000 UNIT(S): 10000 INJECTION INTRAVENOUS; SUBCUTANEOUS at 06:13

## 2024-10-29 RX ADMIN — Medication 20 MILLIEQUIVALENT(S): at 12:53

## 2024-10-29 RX ADMIN — Medication 12.5 MILLIGRAM(S): at 06:13

## 2024-10-29 RX ADMIN — Medication 81 MILLIGRAM(S): at 12:53

## 2024-10-29 RX ADMIN — SODIUM CHLORIDE 3 MILLILITER(S): 9 INJECTION, SOLUTION INTRAMUSCULAR; INTRAVENOUS; SUBCUTANEOUS at 06:24

## 2024-10-29 RX ADMIN — SODIUM CHLORIDE 3 MILLILITER(S): 9 INJECTION, SOLUTION INTRAMUSCULAR; INTRAVENOUS; SUBCUTANEOUS at 13:03

## 2024-10-29 NOTE — DISCHARGE NOTE PROVIDER - NSDCHHHOMEBOUNDOTHER_GEN_ALL_CORE_FT
[facility]    Progress Note    Patient Name: Aretha Matamoros  : 1948  MRN: [id number]  Primary Care Physician:  Demarcus Cope MD  Date of admission: 2023    Subjective     Chief Complaint: Left-sided weakness    History of Present Illness   Patient was undergoing her echo at time of assessment.  Stated she feels back to her baseline.  No acute events overnight.  Denies any headache.    Review of Systems   General: Negative for fever, nausea, or vomiting.   Neurological: Negative for headache, pain, or weakness.     Objective     Physical Exam  Vitals and nursing note reviewed.   Constitutional:       General: She is not in acute distress.  Eyes:      Pupils: Pupils are equal, round, and reactive to light.   Cardiovascular:      Rate and Rhythm: Normal rate.   Pulmonary:      Effort: Pulmonary effort is normal.   Neurological:      Mental Status: She is alert and oriented to person, place, and time.      Cranial Nerves: No cranial nerve deficit, dysarthria or facial asymmetry.      Sensory: No sensory deficit.      Motor: No weakness or seizure activity.      Coordination: Finger-Nose-Finger Test normal.          Vitals:   Temp:  [97.5 °F (36.4 °C)-98 °F (36.7 °C)] 97.7 °F (36.5 °C)  Heart Rate:  [60-83] 80  Resp:  [14-16] 16  BP: (128-144)/(61-76) 144/76    Current Medications    Current Facility-Administered Medications:   •  acetaminophen (TYLENOL) tablet 650 mg, 650 mg, Oral, Q6H PRN, Jessica Ta APRN, 650 mg at 23 1135  •  aspirin tablet 325 mg, 325 mg, Oral, Daily, 325 mg at 23 0847 **OR** aspirin suppository 300 mg, 300 mg, Rectal, Daily, Jacinda Johnson APRN  •  atorvastatin (LIPITOR) tablet 80 mg, 80 mg, Oral, Nightly, Jacinda Johnson APRN, 80 mg at 23  •  sennosides-docusate (PERICOLACE) 8.6-50 MG per tablet 2 tablet, 2 tablet, Oral, BID, 2 tablet at 23 0847 **AND** polyethylene glycol (MIRALAX) packet 17 g, 17 g, Oral, Daily PRN  **AND** bisacodyl (DULCOLAX) EC tablet 5 mg, 5 mg, Oral, Daily PRN **AND** bisacodyl (DULCOLAX) suppository 10 mg, 10 mg, Rectal, Daily PRN, Hay Bass MD  •  Calcium Replacement - Follow Nurse / BPA Driven Protocol, , Does not apply, PRN, Hay Bass MD  •  levothyroxine (SYNTHROID, LEVOTHROID) tablet 50 mcg, 50 mcg, Oral, Daily, Hay Bass MD, 50 mcg at 05/25/23 0847  •  Magnesium Standard Dose Replacement - Follow Nurse / BPA Driven Protocol, , Does not apply, PRN, Hay Bass MD  •  nitroglycerin (NITROSTAT) SL tablet 0.4 mg, 0.4 mg, Sublingual, Q5 Min PRN, Jacinda Johnson APRN  •  ondansetron (ZOFRAN) injection 4 mg, 4 mg, Intravenous, Q6H PRN, Hay Bass MD  •  Phosphorus Replacement - Follow Nurse / BPA Driven Protocol, , Does not apply, PRN, Hay Bass MD  •  Potassium Replacement - Follow Nurse / BPA Driven Protocol, , Does not apply, DUNIA, Hay Bass MD  •  sodium chloride 0.9 % flush 10 mL, 10 mL, Intravenous, PRN, Mohan Tao MD  •  sodium chloride 0.9 % flush 10 mL, 10 mL, Intravenous, Q12H, Jacinda Johnson APRERIKA, 10 mL at 05/25/23 0847  •  sodium chloride 0.9 % infusion 40 mL, 40 mL, Intravenous, PRN, Hay Bass MD    Laboratory Results:   Lab Results   Component Value Date    GLUCOSE 99 05/20/2022    CALCIUM 10.0 05/20/2022     05/20/2022    K 4.0 05/20/2022    CO2 26.0 05/20/2022     05/20/2022    BUN 14 05/20/2022    CREATININE 0.70 05/24/2023    BCR 19.4 05/20/2022    ANIONGAP 11.0 05/20/2022     Lab Results   Component Value Date    WBC 5.38 05/24/2023    HGB 13.8 05/24/2023    HCT 42.3 05/24/2023    MCV 97.0 05/24/2023     05/24/2023     Lab Results   Component Value Date    CHOL 175 05/25/2023     Lab Results   Component Value Date    HDL 64 (H) 05/25/2023     Lab Results   Component Value Date    LDL 97 05/25/2023     Lab Results   Component Value Date    TRIG 75 05/25/2023     Lab Results   Component Value Date    HGBA1C  5.10 05/25/2023     Lab Results   Component Value Date    INR 1.2 05/24/2023    PROTIME 14.1 05/24/2023     No results found for: FOLATE  No results found for: [id number]    MRI Brain Without Contrast    Result Date: 5/24/2023  MRI BRAIN WO CONTRAST, MRI ANGIOGRAM HEAD WO CONTRAST Date of Exam: 5/24/2023 1:55 PM EDT Indication: Stroke, follow up.  Comparison: Same-day head CT Technique:  Routine multiplanar/multisequence sequence images of the brain were obtained without contrast administration. Time-of-flight MRA of the head was performed without IV contrast; rotational MIP reformats were created at independent workstation; assessment of vessel narrowing performed per NASCET criteria or similar method. Findings: Brain MRI: No acute infarct. No intracranial hemorrhage. No intracranial mass. There are mild scattered subcortical and periventricular white matter FLAIR/T2 hyperintensities which are nonspecific and can be seen in the setting of chronic small vessel ischemic change. No extra-axial collections. No midline shift or herniation. Normal size and configuration of the ventricles. The major intracranial vascular flow voids are grossly preserved and unremarkable in appearance. The cerebellopontine angles and midline structures are unremarkable. Normal appearance of the orbits. The paranasal sinuses and mastoid air cells are grossly clear. No acute or suspicious bony findings. MRA head: No abrupt cut off/large vessel occlusion, flow-limiting stenosis, dissection, or aneurysm. There is fetal origin of the right PCA, normal variant.     Impression: Impression: No acute infarct. Normal MRA of the head. Electronically Signed: Estrada Freeman  5/24/2023 2:32 PM EDT  Workstation ID: [id number]     MRA head neck  Indication: Stroke, follow up.     Comparison: Concurrent brain MRI and MRA head     Technique:  Routine 3-D time-of-flight gradient echo imaging was obtained of the neck without contrast administration. Assessment  of vessel narrowing performed per NASCET criteria for similar method.        Findings:  The carotid bifurcations are excluded from the field-of-view. Allowing for this, no evidence of abrupt cut off/large vessel occlusion, flow-limiting stenosis, dissection, or aneurysm. Dominant right vertebral artery system.     IMPRESSION:  Impression:  The carotid bifurcations are excluded from the field-of-view. Allowing for this, no evidence of abrupt cut off/large vessel occlusion, flow-limiting stenosis, dissection, or aneurysm. Dominant right vertebral artery system.    Assessment / Plan     Active Hospital Problems:    Paresthesias    Hyperlipidemia    Hypothyroidism       Brief Patient Summary:  Aretha Matamoros is a 75 y.o. female who has a past medical history of HLD, migraines, palpitations and arthritis who presented to [facility] ED with complaint of left-sided numbness and back pain.  MRA head and neck showed no evidence of flow-limiting stenosis or vessel occlusion, dissection or aneurysm.  MRI brain showed no acute infarct hemorrhage or mass.  CT head without contrast showed no acute intracranial processes.    Plan:   History of chronic Migraine  Left-sided numbness  1.  Patient reports she has returned to her baseline.  She has no headache at this time.  Her stroke work-up was negative.  She reports no palpitations at this time.  Patient was offered follow-up with our general neurology outpatient providers for chronic migraines.  She declined at this time, stating she felt she could manage them on her own.  Patient to follow-up with PCP.  Patient educated to return to hospital if symptoms return.  2.  Patient is okay for discharge once deemed medically appropriate by the hospitalist.      I h ave discussed the above with the patient and bedside RN  Time spent with patient: 35 minutes in face-to-face evaluation and management of the patient.      CARLOS Mayorga            s/p MVR

## 2024-10-29 NOTE — DISCHARGE NOTE PROVIDER - CARE PROVIDER_API CALL
Anthony Hernandez  Thoracic and Cardiac Surgery  [address], [address]  [place] [address]  Phone: [phone]  Fax: [phone]  Scheduled Appointment: 11/05/2024 11:45 AM

## 2024-10-29 NOTE — DISCHARGE NOTE PROVIDER - HOSPITAL COURSE
Patient discussed on morning rounds with Dr. Hernandez   Operation Date: 10/23 MV Replacement  Primary Surgeon/Attending MD: David RAMIREZ  Referring Physician: Dr. Vazquez  _ _ _ _ _ _ _ _ _ _ _ _   HOSPITAL COURSE:   59 y/o M with PMHx HTN, HLD, DM-2, mild CAD and mitral valve prolapse s/p MV repair w/28mm Jaymie ring in 2011 @ [facility] presents with severe mitral valve stenosis. Patient presented to his cardiologist, Dr. Vazquez, with c/o intermittent substernal chest pressure, non-radiating, occurring upon moderate physical exertion that had been gradually worsening. 9/25/24 MARLEEN revealed severe mitral stenosis, mild MR, normal LV function. On 10/23, patient underwent a MV replacement. He arrived to the ICU extubated on no gtts, received volume and then required levo gtt for hypotension. Received 1 PRBC post op for post op anemia. On POD 1, his tvp was removed, he received additional volume for hypotension and euglycemia DKA which resolved. POD2, levo was weened off & he remained stable for transfer to [facility]. POD3, right pleural CT remained with fairly high output but is serous and only blood tinged and medrol dose pack started. POD4, CT output was minimal, he was diuresed, blood sugars had been poorly controlled, and lantus and lispro were added to insulin regimen. He was tachycardic to the 100s throughout the day with a BP in the low 100s, midodrine was weaned to 5mg TID. POD5, Midodrine weaned off, R CT removed, PA/Lateral completed, and TTE completed with high transvalvular gradient and he was started on lopressor 12.5mg q12h. Today POD6 he underwent a MARLEEN without any evidence of thrombus and a mean transvalvular gradient of 13mmHg. His LFTs were elevated to 102/88 on his AM labs, his tylenol was discontinued and he underwent a MARLEEN. He is saturating well on RA and tolerating a PO diet. His case was discussed with Dr. Hernandez and he is medically optimized for discharge to home at this time on ASA, Plavix and lopressor.      DISCHARGE PHYSICAL EXAM:   General: Patient lying comfortably in bed, no acute distress     Neurological: Alert and oriented. No focal neurological deficits     Cardiovascular: S1S2, RRR, no murmurs appreciated on exam     Respiratory: Clear to ausculation bilaterally, no wheeze/rhonchi/rales    Gastrointestinal: + BS, soft, non tender, non distended     Extremities: Warm and well perfused. No edema, no calf tenderness     Vascular: 2+ Peripheral pulses b/l     Incision Sites: R sided thoracotomy clean and intact without evidence of erythema. CT sites clean and intact without drainage. R femoral cutdown clean and intact with dermabond applied.   _ _ _ _ _ _ _  _ _ _ _ _   REMOVAL CHECKLIST:         [ Y] Epicardial wires         [ Y] Stitches/tie downs,   If no, why?          [ N/A] PICC/Midline,   If no, why?    _ _ _ _ _ _ _ _ _ _ _ _   MEDICATION DISCHARGE CHECKLIST        Surgical Valve         [ Y] Aspirin, [  ] Contraindicated, Reason:         [ Y ] Plavix         [ Y] Lasix, [  ] Contraindicated, Reason:              Duration:  7 days        [ Y] Beta-Blocker, [  ] Contraindicated, Reason:     _ _ _ _ _ _ _ _ _ _ _   RELEVANT LABS/IMAGING:     < from: Xray Chest 2 Views PA/Lat (10.28.24 @ 10:32) >      IMPRESSION: Right chest tube removed. Trace right apical pneumothorax.   Small bilateral effusions.    < end of copied text >    Follow Up Labs  [ CMP ] - repeat CMP at follow-up appointment to assess liver enzymes  _ _ _ _ _ _ _ _ _ _ _ _   Over 35 minutes was spent with the patient reviewing the discharge material including medications, follow up appointments, recovery, concerning symptoms, and how to contact their health care providers if they have questions.

## 2024-10-29 NOTE — DISCHARGE NOTE PROVIDER - NSDCFUSCHEDAPPT_GEN_ALL_CORE_FT
Yes Anthony Hernandez  [facility] Physician [facility]  [facility] [address]  Scheduled Appointment: 11/05/2024

## 2024-10-29 NOTE — DISCHARGE NOTE NURSING/CASE MANAGEMENT/SOCIAL WORK - PATIENT PORTAL LINK FT
You can access the FollowMyHealth Patient Portal offered by [facility] by registering at the following website: [email or web address]. By joining [employer]’s FollowMyHealth portal, you will also be able to view your health information using other applications (apps) compatible with our system.

## 2024-10-29 NOTE — PROGRESS NOTE ADULT - SUBJECTIVE AND OBJECTIVE BOX
GRACE MADRIGAL , [id number],  [facility] [id number]    Time of encounter : 9 am   seen sitting on bedside chair, npo waiting for MARLEEN   no dizziness  no chest pain   voiding well       T(C): 36.5 (10-29-24 @ 14:31), Max: 36.5 (10-29-24 @ 05:00)  HR: 82 (10-29-24 @ 11:53) (74 - 83)  BP: 103/62 (10-29-24 @ 11:53) (95/58 - 115/71)  RR: 19 (10-29-24 @ 11:53) (14 - 19)  SpO2: 95% (10-29-24 @ 11:53) (95% - 98%)    aspirin enteric coated 81 milliGRAM(s) Oral daily  atorvastatin 10 milliGRAM(s) Oral at bedtime  bacitracin   Ointment 1 Application(s) Topical daily  chlorhexidine 2% Cloths 1 Application(s) Topical daily  dextrose 5%. 1000 milliLiter(s) IV Continuous <Continuous>  dextrose 5%. 1000 milliLiter(s) IV Continuous <Continuous>  dextrose 50% Injectable 25 Gram(s) IV Push once  dextrose 50% Injectable 12.5 Gram(s) IV Push once  dextrose 50% Injectable 25 Gram(s) IV Push once  dextrose Oral Gel 15 Gram(s) Oral once PRN  furosemide    Tablet 40 milliGRAM(s) Oral daily  glucagon  Injectable 1 milliGRAM(s) IntraMuscular once  glucagon  Injectable 1 milliGRAM(s) IntraMuscular once  glucagon  Injectable 1 milliGRAM(s) IntraMuscular once  heparin   Injectable 5000 Unit(s) SubCutaneous every 8 hours  insulin glargine Injectable (LANTUS) 12 Unit(s) SubCutaneous at bedtime  insulin lispro (ADMELOG) corrective regimen sliding scale   SubCutaneous Before meals and at bedtime  insulin lispro Injectable (ADMELOG) 5 Unit(s) SubCutaneous three times a day before meals  methylPREDNISolone 4 milliGRAM(s) Oral at bedtime  methylPREDNISolone 24 milliGRAM(s) Oral once  methylPREDNISolone 4 milliGRAM(s) Oral before breakfast  methylPREDNISolone   Oral   metoprolol tartrate 12.5 milliGRAM(s) Oral every 12 hours  oxyCODONE    IR 5 milliGRAM(s) Oral every 6 hours PRN  pantoprazole    Tablet 40 milliGRAM(s) Oral daily  polyethylene glycol 3350 17 Gram(s) Oral daily  potassium chloride    Tablet ER 20 milliEquivalent(s) Oral daily  senna 2 Tablet(s) Oral at bedtime  sodium chloride 0.9% lock flush 3 milliLiter(s) IV Push every 8 hours  tamsulosin 0.4 milliGRAM(s) Oral at bedtime      Physical Exam :    General exam :  well built, not in distress, saturating well on room air  CVS : RRR systolic murmur  Lungs : surgical site on right anterior axilla- some bruises, clean. moving good air   Abdomen : BS present, soft, not tender, not distended.  Extremities: no edema, no tenderness.  Neuro : AAO x 3 non focal.  Skin : warm and dry.   :  no collier.      CBC Full  -  ( 29 Oct 2024 05:30 )  WBC Count : 8.18 K/uL  RBC Count : 3.58 M/uL  Hemoglobin : 11.0 g/dL  Hematocrit : 34.7 %  Platelet Count - Automated : 224 K/uL  Mean Cell Volume : 96.9 fl  Mean Cell Hemoglobin : 30.7 pg  Mean Cell Hemoglobin Concentration : 31.7 gm/dL  Auto Neutrophil # : x  Auto Lymphocyte # : x  Auto Monocyte # : x  Auto Eosinophil # : x  Auto Basophil # : x  Auto Neutrophil % : x  Auto Lymphocyte % : x  Auto Monocyte % : x  Auto Eosinophil % : x  Auto Basophil % : x        10-29    140  |  102  |  18  ----------------------------<  172[H]  4.2   |  28  |  0.75    Ca    9.4      29 Oct 2024 05:30  Mg     2.2     10-29    TPro  7.8  /  Alb  4.2  /  TBili  0.7  /  DBili  x   /  AST  102[H]  /  ALT  88[H]  /  AlkPhos  159[H]  10-29    Daily     Daily   CAPILLARY BLOOD GLUCOSE      POCT Blood Glucose.: 157 mg/dL (29 Oct 2024 11:55)      Urinalysis Basic - ( 29 Oct 2024 05:30 )    Color: x / Appearance: x / SG: x / pH: x  Gluc: 172 mg/dL / Ketone: x  / Bili: x / Urobili: x   Blood: x / Protein: x / Nitrite: x   Leuk Esterase: x / RBC: x / WBC x   Sq Epi: x / Non Sq Epi: x / Bacteria: x        PT/INR - ( 29 Oct 2024 05:30 )   PT: 10.3 sec;   INR: 0.89          PTT - ( 29 Oct 2024 05:30 )  PTT:25.9 sec

## 2024-10-29 NOTE — DISCHARGE NOTE PROVIDER - NSDCCPTREATMENT_GEN_ALL_CORE_FT
PRINCIPAL PROCEDURE  Procedure: Replacement, mitral valve, with MARLEEN, adult  Findings and Treatment: Robotic MV repalcement EF 45%

## 2024-10-29 NOTE — DISCHARGE NOTE NURSING/CASE MANAGEMENT/SOCIAL WORK - FINANCIAL ASSISTANCE
[facility] provides services at a reduced cost to those who are determined to be eligible through [facility]’s financial assistance program. Information regarding [facility]’s financial assistance program can be found by going to [email or web address] or by calling [phone].

## 2024-10-29 NOTE — DISCHARGE NOTE PROVIDER - NSDCFUADDINST_GEN_ALL_CORE_FT
- You may resume your home regimen for your diabetes. Please hold the metoprolol tartrate that you were taking at home and take the lower dose metoprolol prescription you are being sent.     - Walk daily as tolerated and use your incentive spirometer 10 times every hour while you are awake.     -Please weigh yourself daily. If you notice over a 3 pound weight gain in 3 days, this is a sign you are likely retaining too much fluid. It is imperative you call our right away with unexplained rapid weight gain.      -Please continue to wear the compression stockings given to you in the hospital at home. This is a way to prevent fluid from building up in your legs.     -No driving or strenuous activity/exercise until cleared by your surgeon.    -Gently clean your incisions with unscented/antibacterial soap and water, pat dry.  You may leave them open to air.    -Call your doctor if you have shortness of breath, chest pain not relieved by pain medication, dizziness, fever >101.5, or increased redness or drainage from incisions.

## 2024-10-29 NOTE — DISCHARGE NOTE PROVIDER - NSDCMRMEDTOKEN_GEN_ALL_CORE_FT
Aspirin EC 81 mg oral delayed release tablet: 1 tab(s) orally once a day  furosemide 40 mg oral tablet: 1 tab(s) orally once a day  Januvia 25 mg oral tablet: 1 tab(s) orally once a day  Medrol Dosepak 4 mg oral tablet: 1 tab(s) orally once a day please follow instructions provided by your team and begin after supper on day 3  metFORMIN 500 mg oral tablet: 1 tab(s) orally 2 times a day  metoprolol tartrate 25 mg oral tablet: 0.5 tab(s) orally every 12 hours  oxyCODONE 5 mg oral tablet: 1 tab(s) orally every 6 hours as needed for  severe pain MDD: 4 tablet  pantoprazole 40 mg oral delayed release tablet: 1 tab(s) orally once a day  Plavix 75 mg oral tablet: 1 tab(s) orally  polyethylene glycol 3350 oral powder for reconstitution: 17 gram(s) orally once a day as needed for  constipation  potassium chloride 20 mEq oral tablet, extended release: 1 tab(s) orally once a day please take with lasix  simvastatin 20 mg oral tablet: 1 tab(s) orally once a day (at bedtime)  tamsulosin 0.4 mg oral capsule: 1 cap(s) orally once a day (at bedtime)

## 2024-10-29 NOTE — PROGRESS NOTE ADULT - REASON FOR ADMISSION
Mitral Valve Stenosis

## 2024-10-29 NOTE — DISCHARGE NOTE PROVIDER - NSDCFUADDAPPT_GEN_ALL_CORE_FT
Please attend your scheduled follow-up with Dr. Hernandez. Please call your cardiologist Dr. Vazquez and follow-up with them in 2 weeks.

## 2024-10-29 NOTE — PROGRESS NOTE ADULT - ASSESSMENT
57 yo male with PMHx HTN, HLD, DM-2, mild CAD and mitral valve prolapse s/p MV repair w/28mm Jaymie ring in 2011 @ [facility] presents with severe mitral valve stenosis. Patient presented to his cardiologist, Dr. Vazquez.  7/21/24 TTE: LVEF 64%; s/p MV Ring with Severe MS. 8/11/24 Stress echo negative for ischemia. 9/25/24 Cardiac cath revealed non-obstructive CAD. 9/25/24 MARLEEN revealed severe mitral stenosis, mild MR, normal LV function. Pt underwent robotic mitral valve replacement (25mm Mosaic Bio Valve, EF 45%, CBP 133min) with Dr. Hernandez  10/23/24.   POD#4    - active care per CTS team and Dr. Hernandez   - c/w midodrine 10 milliGRAM(s) Oral every 8 hours, keep MAP>65mmHg   = c/w lasix 40mg P Odaily   - CT x1 /CXR reviewed, angel-dose karmen for high CT serous output( 10/26-   - incentive spirometer use/OOBTC/ambulation as tolerated   - posp fever: 102*F likely 2/2 inflammation, UA and CXR negative   - sp op abx ppx: ceFAZolin   IVPB 2000 milliGRAM(s) IV Intermittent every 8 hours x 3 doses per protocol                             mupirocin 2% Ointment 1 Application(s) Both Nostrils two times a day                             chlorhexidine 2% Cloths 1 Application(s) Topical daily  - pain control:  gabapentin 100 milliGRAM(s) Oral every 8 hours  oxyCODONE    IR 5 milliGRAM(s) Oral every 6 hours PRN  - bowel regimen:   polyethylene glycol 3350 17 Gram(s) Oral daily  senna 2 Tablet(s) Oral at bedtime  - CAD: aspirin enteric coated 81 milliGRAM(s) Oral daily  - HLD: atorvastatin 10 milliGRAM(s) Oral at bedtime  - plastic tape allergy: use paper tape only as d/w nursing, and bacitracin ointment to affected areas    [facility]  [facility]   Brother-in-law: Dr. Chelsea Aguilar ( Pediatrician)   
59 y/o M with PMHx HTN, HLD, DM-2, mild CAD and mitral valve prolapse s/p MV repair w/28mm Jaymie ring in 2011 @ [facility] presents with severe mitral valve stenosis. Patient presented to his cardiologist, Dr. Vazquez, with c/o intermittent substernal chest pressure, non-radiating, occurring upon moderate physical exertion that had been gradually worsening. 9/25/24 MARLEEN revealed severe mitral stenosis, mild MR, normal LV function. On 10/23, patient underwent a MV replacement. He arrived to the ICU extubated on no gtts, received volume and then required levo gtt for hypotension. Received 1 PRBC post op for post op anemia. On POD 1, his tvp was removed, he received additional volume for hypotension and euglycemia DKA which resolved. POD2, levo was weened off & he remained stable for transfer to [facility]. POD3, right pleural CT remained with fairly high output but is serous and only blood tinged and medrol dose pack started. POD4, CT output was minimal, he was diuresed, blood sugars had been poorly controlled, and lantus and lispro were added to insulin regimen. He was tachycardic to the 100s throughout the day with a BP in the low 100s, midodrine was weaned to 5mg TID. POD5, Midodrine weaned off, R CT removed. TTE completed with high transvalvular gradient. Reviewed with , although SBPs soft, given Lopressor 12.5mg which he should continue to get ahead of MARLEEN tomorrow to look at valve.     A/P:  Neurovascular: No delirium. Pain well controlled with current regimen.  -Pain: tylenol prn, oxy prn    Cardiovascular: Hemodynamically stable. HR controlled.  -H/o of MVP s/p MVr in 2011, now with severe MV stenosis and s/p MVR on 10/23  C/w Lasix 40mg daily (potassium 20mg daily), given additional 40mg lasix today   C/w ASA, weaned off Midodrine, started on Lopressor 12.5mg BID, c/w medrol dose karmen.  C/w post op ERP   Post op TTE with increased gradients: A bioprosthetic valve is noted in the mitral position; which appears well-seated. The mean transvalvular gradient is 16.00-22.00 mmHg at a heart rate of 72-88 bpm. There is trace mitral regurgitation. Leaflets are not well visualized due to stent struts obscuring the leaflet  Pending MARLEEN tomorrow   -HLD: c/w Lipitor 10mg daily   -HTN: just started on Lopressor 12.5mg BID    Respiratory: 02 Sat = 98% on RA.  -Encourage C+DB and Use of IS 10x / hr while awake.  -CXR: small R apical PTX    GI: Stable.  -NPO after MN for MARLEEN tomorrow   -PPX: pantoprazole 40mg daily   -PO Diet.  -Bowel regimen: c/w senna, c/w miralax     Renal / :  -Monitor renal function: BUN 14, Cr 0.69  -Monitor I/O's.  -BPH: c/w tamsulosin .4mg daily     Endocrine:    -T2DM (A1c: 6.7%): has home DM medications, elevated glucose inpatient, started on insulin    Lantus 10 units at night, Lispro 3 units with meals, ISS  -TSH: 0.473    Hematologic:  -CBC: RBC 3.42, Hgb 10.8, Plt 169  -Coagulation Panel: no recent coags, 10/25/24 normal     ID:  -Tempature: afebrile  -CBC: WBC 6.82  -Observe for SIRS/Sepsis Syndrome.    Prophylaxis:  -DVT prophylaxis with 5000 SubQ Heparin q8h.  -SCD's    Disposition:  -Telemetry, was planned for discharge, but with increased gradients on TTE today pending MARLEEN tomorrow   
59 y/o M with PMHx HTN, HLD, DM-2, mild CAD and mitral valve prolapse s/p MV repair w/28mm Jaymie ring in 2011 @ [facility] presents with severe mitral valve stenosis. Patient presented to his cardiologist, Dr. Vazquez, with c/o intermittent substernal chest pressure, non-radiating, occurring upon moderate physical exertion that had been gradually worsening. 9/25/24 MARLEEN revealed severe mitral stenosis, mild MR, normal LV function. On 10/23, patient underwent a MV replacement He arrived to the ICU extubated on no gtts, received volume and then required levo gtt for hypotension. Received 1 PRBC post op for post op anemia. On POD 1, his tvp was removed, he received additional volume for hypotension and euglycemia dka which resoleved. on POD 2, levo was weened off & he remained stable for transfer to [facility]. POD3 progressing well, right pleural CT remains with fairly high output but is serous and only blood tinged. No concerns, just monitoring output and will start medrol dosepak.    Plan:    Neurovascular:   -Pain regimen:    -PRN's: acetaminophen, oxycodone  -continue with gabapentin in hour     Cardiovascular:   -POD3 MV replacement, EF 45%     -continue with asa    -keeping chest tube for high serous output - started on medrol dosepak today   -HLD: continue with statin 10   -hypotension: continue with midodrine 10mg TID for now   -Hemodynamically stable.   -Monitor: BP, HR, tele    Respiratory:   -Oxygenating well on room air   -Encourage continued use of IS 10x/hr and frequent ambulation  -CXR: no acute pathology     GI:  -GI PPX: pantoprazole 40mg daily   -PO Diet: Consistent carbs/no snacks   -Bowel Regimen: senna, miralax     Renal / :  -Diuresis: lasix 40mg daily and potassium 20  -BUN/Cr 9/0.75  -Monitor I/O's daily     Endocrine:    -A1c: 6.7  -TSH: 0.473     Hematologic:  -CBC: H/H- 9.9/29    ID:  -febrile overnight, Dr. Hernandez and Dr. Guadalupe aware, normal postop fever - no intervention juts monitoring for now.     -TLC removed after fever   -Temperature: afebrile now   -CBC: WBC- 9    Prophylaxis:  -DVT prophylaxis with heparin SubQ  -Continue with SCD's b/l while patient is at rest     Disposition:  -Discharge home once patient is medically ready  
57 yo male with PMHx HTN, HLD, DM-2, mild CAD and mitral valve prolapse s/p MV repair w/28mm Jaymie ring in 2011 @ [facility] presents with severe mitral valve stenosis. Patient presented to his cardiologist, Dr. Vazquez.  7/21/24 TTE: LVEF 64%; s/p MV Ring with Severe MS. 8/11/24 Stress echo negative for ischemia. 9/25/24 Cardiac cath revealed non-obstructive CAD. 9/25/24 MARLEEN revealed severe mitral stenosis, mild MR, normal LV function. Pt underwent robotic mitral valve replacement (25mm Mosaic Bio Valve, EF 45%, CBP 133min) with Dr. Hernandez  10/23/24.   POD#3    - active care per CTS team and Dr. Hernandez   - c/w midodrine 10 milliGRAM(s) Oral every 8 hours, keep MAP>65mmHg   = c/w lasix 40mg P Odaily   - CT x1 /CXR reviewed , noted adding medro-dose karmen for high CT serous output- MP 24mg ( 10/26)   - incentive spirometer use/OOBTC/ambulation as tolerated   - posp fever: 102*F likely 2/2 inflammation, UA and CXR negative   - sp op abx ppx: ceFAZolin   IVPB 2000 milliGRAM(s) IV Intermittent every 8 hours x 3 doses per protocol                             mupirocin 2% Ointment 1 Application(s) Both Nostrils two times a day                             chlorhexidine 2% Cloths 1 Application(s) Topical daily  - pain control:  gabapentin 100 milliGRAM(s) Oral every 8 hours  oxyCODONE    IR 5 milliGRAM(s) Oral every 6 hours PRN  - bowel regimen:   polyethylene glycol 3350 17 Gram(s) Oral daily  senna 2 Tablet(s) Oral at bedtime  - CAD: aspirin enteric coated 81 milliGRAM(s) Oral daily  - HLD: atorvastatin 10 milliGRAM(s) Oral at bedtime    Contact:  [facility] cardiology Dr. Jagjit Vazquez   Brother-in-law: Dr. Chelsea Aguilar ( Pediatrician)   
59 yo male with PMHx HTN, HLD, DM-2, mild CAD and mitral valve prolapse s/p MV repair w/28mm Jaymie ring in 2011 @ [facility] presents with severe mitral valve stenosis. Patient presented to his cardiologist, Dr. Vazquez.  7/21/24 TTE: LVEF 64%; s/p MV Ring with Severe MS. 8/11/24 Stress echo negative for ischemia. 9/25/24 Cardiac cath revealed non-obstructive CAD. 9/25/24 MARLEEN revealed severe mitral stenosis, mild MR, normal LV function. Pt underwent robotic mitral valve replacement (25mm Mosaic Bio Valve, EF 45%, CBP 133min) with Dr. Hernandez  10/23/24.   POD#5    - active care per CTS team and Dr. Hernandez   metoprolol low dose so far tolerating  = c/w lasix 40mg POdaily   - interval removal of chest tube  cw , medro-dose karmen   - incentive spirometer use/OOBTC/ambulation as tolerated   - pain control:  gabapentin 100 milliGRAM(s) Oral every 8 hours  oxyCODONE    IR 5 milliGRAM(s) Oral every 6 hours PRN  - bowel regimen:   polyethylene glycol 3350 17 Gram(s) Oral daily  senna 2 Tablet(s) Oral at bedtime  - CAD: aspirin enteric coated 81 milliGRAM(s) Oral daily  - HLD: atorvastatin 10 milliGRAM(s) Oral at bedtime  - plastic tape allergy: use paper tape only  and bacitracin ointment to affected areas  - TTE(10/28): high transvalvular gradient 16-22Hg  - plan for MARLEEN today ( 10/29) per CTS     Contact:  [facility] cardiology Dr. Jagjit Vazquez   Brother-in-law: Dr. Chelsea Aguilar ( Pediatrician)     thank you for allowing medicine to participate in the care.     Dr. Chase Mejia covering 10/29-10/31  
Assessment   59 y/o M with PMHx HTN, HLD, DM-2, mild CAD and mitral valve prolapse s/p MV repair w/28mm Jaymie ring in 2011 @ [facility] presents with severe mitral valve stenosis. Patient presented to his cardiologist, Dr. Vazquez, with c/o intermittent substernal chest pressure, non-radiating, occurring upon moderate physical exertion that had been gradually worsening. 9/25/24 MARLEEN revealed severe mitral stenosis, mild MR, normal LV function. On 10/23, patient underwent a MV replacement He arrived to the ICU extubated on no gtts, received volume and then required levo gtt for hypotension. Received 1 PRBC post op for post op anemia. On POD 1, his tvp was removed, he received additional volume for hypotension and euglycemia dka which resoleved. on POD 2, levo was weened off & he remained stable for transfer to [facility]. POD3 progressing well, right pleural CT remains with fairly high output but is serous and only blood tinged and medrol dose karmen started. POD4 CT output is minimal, he was diuresed, blood sugars have been poorly controlled, and lantus and lispro were added to insulin regimen. He was tachycardic to the 100s throughout the day with a BP in the low 100s, midodrine was weaned to 5mg TID with a plan to start bb and get discharge TTE tomorrow.     Plan:    Neurovascular:   -Pain well controlled with current regimen. PRN's: tylenol and oxycodone   - continue ERP protocol with gabapentin, vitamin C and mupirocin    Cardiovascular:   - Hx of MVP s/p MVr in 2011, now with severe MV stenosis and s/p MVR on 10/23       - continue lasix 40mg daily, received an additional 20mg IV dose today       - continue ASA, medrol dose karmen, midodrine weaned to 5mg TID with plan to add bb if he can tolerate it tomorrow       - pending post op TTE tomorrow   - Hx of HLD - continue atorvastatin 10mg  - Hx of HTN   -Hemodynamically stable.   -Monitor: BP, HR, tele    Respiratory:   -Oxygenating well on room air  -Encourage continued use of IS 10x/hr and frequent ambulation  -CXR: small R sided PTX    - pending PA/Lat tomorrow    GI:  -GI PPX: protonix   -PO Diet  -Bowel Regimen: miralax/senna     Renal / :  -Continue to monitor renal function: BUN/Cr 12/.79  -Monitor I/O's daily   - continue flomax    Endocrine:    -Hx of DM  -A1c: 6.7, on oral medications at home, sugars poorly controlled, today, started Lispro 3U, Lantus 10U, and continuing MISS  -TSH: .47    Hematologic:  -CBC: H/H- 12.1/37  -Coagulation Panel.    ID:  -Temperature: afebrile  -CBC: WBC- 6.9    Prophylaxis:  -DVT prophylaxis with 5000 SubQ Heparin q8h.  -Continue with SCD's b/l while patient is at rest     Disposition:  -Discharge home tomorrow vs tuesday 
57 yo male with PMHx HTN, HLD, DM-2, mild CAD and mitral valve prolapse s/p MV repair w/28mm Jaymie ring in 2011 @ [facility] presents with severe mitral valve stenosis. Patient presented to his cardiologist, Dr. Vazquez.  7/21/24 TTE: LVEF 64%; s/p MV Ring with Severe MS. 8/11/24 Stress echo negative for ischemia. 9/25/24 Cardiac cath revealed non-obstructive CAD. 9/25/24 MARLEEN revealed severe mitral stenosis, mild MR, normal LV function. Pt underwent robotic mitral valve replacement (25mm Mosaic Bio Valve, EF 45%, CBP 133min) with Dr. Hernandez  10/23/24. POD#1     - active care per [facility] team and Dr. Hernandez   - Pressor: norepinephrine Infusion 0.05 MICROgram(s)/kG/Min IV Continuous <Continuous>,midodrine 10 milliGRAM(s) Oral every 8 hours   keep MAP>65mmHg   - CT x1 /CXR reviewed   - incentive spirometer use/OOBTC/ambulation as tolerated   - postop abx ppx: ceFAZolin   IVPB 2000 milliGRAM(s) IV Intermittent every 8 hours x 3 doses per protocol                             mupirocin 2% Ointment 1 Application(s) Both Nostrils two times a day                             chlorhexidine 2% Cloths 1 Application(s) Topical daily  - pain control:  gabapentin 100 milliGRAM(s) Oral every 8 hours  oxyCODONE    IR 5 milliGRAM(s) Oral every 6 hours PRN  - bowel regimen:   polyethylene glycol 3350 17 Gram(s) Oral daily  senna 2 Tablet(s) Oral at bedtime  - CAD: aspirin enteric coated 81 milliGRAM(s) Oral daily  - HLD: atorvastatin 10 milliGRAM(s) Oral at bedtime    Contact:  [facility] cardiology Dr. Jagjit Vazquez   Brother-in-law: Dr. Chelsea Aguilar ( Pediatrician)   
57 yo male with PMHx HTN, HLD, DM-2, mild CAD and mitral valve prolapse s/p MV repair w/28mm Jaymie ring in 2011 @ [facility] presents with severe mitral valve stenosis. Patient presented to his cardiologist, Dr. Vazquez.  7/21/24 TTE: LVEF 64%; s/p MV Ring with Severe MS. 8/11/24 Stress echo negative for ischemia. 9/25/24 Cardiac cath revealed non-obstructive CAD. 9/25/24 MARLEEN revealed severe mitral stenosis, mild MR, normal LV function. Pt underwent robotic mitral valve replacement (25mm Mosaic Bio Valve, EF 45%, CBP 133min) with Dr. Hernandez  10/23/24.   POD#5    - active care per CTS team and Dr. Hernandez   - c/w midodrine 10 milliGRAM(s) Oral every 8 hours, keep MAP>65mmHg   = c/w lasix 40mg POdaily   - CT x1 /CXR reviewed, medro-dose karmen for high CT serous output( 10/26-   - incentive spirometer use/OOBTC/ambulation as tolerated   - posp fever ( resolved): 102*F likely 2/2 inflammation, UA and CXR negative   - sp op abx ppx: ceFAZolin   IVPB 2000 milliGRAM(s) IV Intermittent every 8 hours x 3 doses per protocol                             mupirocin 2% Ointment 1 Application(s) Both Nostrils two times a day                             chlorhexidine 2% Cloths 1 Application(s) Topical daily  - pain control:  gabapentin 100 milliGRAM(s) Oral every 8 hours  oxyCODONE    IR 5 milliGRAM(s) Oral every 6 hours PRN  - bowel regimen:   polyethylene glycol 3350 17 Gram(s) Oral daily  senna 2 Tablet(s) Oral at bedtime  - CAD: aspirin enteric coated 81 milliGRAM(s) Oral daily  - HLD: atorvastatin 10 milliGRAM(s) Oral at bedtime  - plastic tape allergy: use paper tape only as d/w nursing, and bacitracin ointment to affected areas  - TTE(10/28): high transvalvular gradient 16-22Hg  - plan for MARLEEN tomorrow ( 10/29) per CTS     Contact:  [facility] Dr. Jagjit [facility]   Brother-in-law: Dr. Chelsea Aguilar ( Pediatrician)     Dr. Chase Mejia covering 10/29-10/31  
57 yo male with PMHx HTN, HLD, DM-2, mild CAD and mitral valve prolapse s/p MV repair w/28mm Jaymie ring in 2011 @ [facility] presents with severe mitral valve stenosis. Patient presented to his cardiologist, Dr. Vazquez, with c/o intermittent substernal chest pressure, non-radiating, occurring upon moderate physical exertion, which improves w/ rest, that has been gradually worsening over the past few weeks.9/25/24 MARLEEN revealed severe mitral stenosis, mild MR, normal LV function. On 10/23, patient underwent a MV replacement He arrived to the ICU extubated on no gtts, received volume and then required levo gtt for hypotension. Received 1 PRBC post op for post op anemia. On POD 1, his tvp was removed, he received additional volume for hypotension and euglycemia dka which resoleved. on POD 2, levo was weened off & he remained stable for transfer to [facility].     Neuro: pain management   - Oxy PRN     CVS: HTN, HLD Mitral stenosis s/p re-op MV replacement   - Continue ASA, Atorvastatin 10mg   - continue Midodrine 10 TID   - BB when BP allows.     Respiratory: Saturating well on   - Wean off O2 sat > 92%  - IS and ambulation  - Chest PT.     GI: carb consistent diet.   - GI PPX   - Bowel regimen with Senna and Miralax     : BUN/ Creatinine. 8/0.72  - monitor I/Os.   - passed trial of void.   - continue flomax   - holding off on further diuresis while blood pressures are lower.     Endo: 7.1 A1c FS well controlled.   - ISS  - afternoon glucose elevated. If continues will likely need pre-meal insulin.     ID: WBC wnl, afebrile.   - continue to monitor fever curve     Heme: DVT PPX   - SQH     Dispo plan: home when medically ready     Oksana Parks PA-C

## 2024-10-29 NOTE — PROGRESS NOTE ADULT - PROVIDER SPECIALTY LIST ADULT
CT Surgery
Critical Care
Hospitalist
CT Surgery
Critical Care
Critical Care
Hospitalist
CT Surgery
CT Surgery
Critical Care
Hospitalist

## 2024-10-30 ENCOUNTER — APPOINTMENT (OUTPATIENT)
Dept: CARE COORDINATION | Facility: HOME HEALTH | Age: 59
End: 2024-10-30
Payer: COMMERCIAL

## 2024-10-30 VITALS — WEIGHT: 138 LBS | BODY MASS INDEX: 21.61 KG/M2

## 2024-10-30 PROCEDURE — 99024 POSTOP FOLLOW-UP VISIT: CPT

## 2024-10-30 RX ORDER — BLOOD-GLUCOSE,RECEIVER,CONT
EACH MISCELLANEOUS
Qty: 1 | Refills: 0 | Status: ACTIVE | COMMUNITY
Start: 2024-10-30 | End: 1900-01-01

## 2024-10-30 RX ORDER — BLOOD-GLUCOSE TRANSMITTER
EACH MISCELLANEOUS
Qty: 1 | Refills: 0 | Status: ACTIVE | COMMUNITY
Start: 2024-10-30 | End: 1900-01-01

## 2024-10-30 RX ORDER — PANTOPRAZOLE SODIUM 40 MG/1
40 TABLET, DELAYED RELEASE ORAL
Qty: 30 | Refills: 0 | Status: ACTIVE | COMMUNITY

## 2024-10-30 RX ORDER — CLOPIDOGREL BISULFATE 75 MG/1
75 TABLET, FILM COATED ORAL
Qty: 30 | Refills: 0 | Status: ACTIVE | COMMUNITY

## 2024-10-30 RX ORDER — OXYCODONE 5 MG/1
5 TABLET ORAL EVERY 8 HOURS
Qty: 15 | Refills: 0 | Status: ACTIVE | COMMUNITY

## 2024-10-30 RX ORDER — BLOOD-GLUCOSE SENSOR
EACH MISCELLANEOUS
Qty: 3 | Refills: 10 | Status: ACTIVE | COMMUNITY
Start: 2024-10-30 | End: 1900-01-01

## 2024-10-31 DIAGNOSIS — Z91.040 LATEX ALLERGY STATUS: ICD-10-CM

## 2024-10-31 DIAGNOSIS — E78.5 HYPERLIPIDEMIA, UNSPECIFIED: ICD-10-CM

## 2024-10-31 DIAGNOSIS — I95.9 HYPOTENSION, UNSPECIFIED: ICD-10-CM

## 2024-10-31 DIAGNOSIS — Z79.82 LONG TERM (CURRENT) USE OF ASPIRIN: ICD-10-CM

## 2024-10-31 DIAGNOSIS — Z79.84 LONG TERM (CURRENT) USE OF ORAL HYPOGLYCEMIC DRUGS: ICD-10-CM

## 2024-10-31 DIAGNOSIS — D69.6 THROMBOCYTOPENIA, UNSPECIFIED: ICD-10-CM

## 2024-10-31 DIAGNOSIS — I05.2 RHEUMATIC MITRAL STENOSIS WITH INSUFFICIENCY: ICD-10-CM

## 2024-10-31 DIAGNOSIS — Z95.818 PRESENCE OF OTHER CARDIAC IMPLANTS AND GRAFTS: ICD-10-CM

## 2024-10-31 DIAGNOSIS — R00.1 BRADYCARDIA, UNSPECIFIED: ICD-10-CM

## 2024-10-31 DIAGNOSIS — I25.10 ATHEROSCLEROTIC HEART DISEASE OF NATIVE CORONARY ARTERY WITHOUT ANGINA PECTORIS: ICD-10-CM

## 2024-10-31 DIAGNOSIS — R00.0 TACHYCARDIA, UNSPECIFIED: ICD-10-CM

## 2024-10-31 DIAGNOSIS — D62 ACUTE POSTHEMORRHAGIC ANEMIA: ICD-10-CM

## 2024-10-31 DIAGNOSIS — I10 ESSENTIAL (PRIMARY) HYPERTENSION: ICD-10-CM

## 2024-10-31 DIAGNOSIS — R50.82 POSTPROCEDURAL FEVER: ICD-10-CM

## 2024-10-31 DIAGNOSIS — E11.10 TYPE 2 DIABETES MELLITUS WITH KETOACIDOSIS WITHOUT COMA: ICD-10-CM

## 2024-10-31 DIAGNOSIS — J95.1 ACUTE PULMONARY INSUFFICIENCY FOLLOWING THORACIC SURGERY: ICD-10-CM

## 2024-10-31 PROBLEM — Z95.3 S/P MITRAL VALVE REPLACEMENT WITH TISSUE VALVE: Status: ACTIVE | Noted: 2024-10-25

## 2024-10-31 PROBLEM — Z09 POSTOP CHECK: Status: ACTIVE | Noted: 2024-10-25

## 2024-11-04 LAB — SURGICAL PATHOLOGY STUDY: SIGNIFICANT CHANGE UP

## 2024-11-05 ENCOUNTER — OUTPATIENT (OUTPATIENT)
Dept: OUTPATIENT SERVICES | Facility: HOSPITAL | Age: 59
LOS: 1 days | End: 2024-11-05
Payer: COMMERCIAL

## 2024-11-05 ENCOUNTER — APPOINTMENT (OUTPATIENT)
Dept: CARDIOTHORACIC SURGERY | Facility: CLINIC | Age: 59
End: 2024-11-05
Payer: COMMERCIAL

## 2024-11-05 ENCOUNTER — NON-APPOINTMENT (OUTPATIENT)
Age: 59
End: 2024-11-05

## 2024-11-05 VITALS
DIASTOLIC BLOOD PRESSURE: 55 MMHG | TEMPERATURE: 97.2 F | HEIGHT: 67 IN | WEIGHT: 133 LBS | BODY MASS INDEX: 20.88 KG/M2 | SYSTOLIC BLOOD PRESSURE: 95 MMHG | HEART RATE: 90 BPM | OXYGEN SATURATION: 99 %

## 2024-11-05 DIAGNOSIS — Z98.890 OTHER SPECIFIED POSTPROCEDURAL STATES: Chronic | ICD-10-CM

## 2024-11-05 DIAGNOSIS — Z09 ENCOUNTER FOR FOLLOW-UP EXAMINATION AFTER COMPLETED TREATMENT FOR CONDITIONS OTHER THAN MALIGNANT NEOPLASM: ICD-10-CM

## 2024-11-05 DIAGNOSIS — Z95.3 PRESENCE OF XENOGENIC HEART VALVE: ICD-10-CM

## 2024-11-05 PROCEDURE — 36415 COLL VENOUS BLD VENIPUNCTURE: CPT

## 2024-11-05 PROCEDURE — 71046 X-RAY EXAM CHEST 2 VIEWS: CPT | Mod: 26

## 2024-11-05 PROCEDURE — 83880 ASSAY OF NATRIURETIC PEPTIDE: CPT

## 2024-11-05 PROCEDURE — 99024 POSTOP FOLLOW-UP VISIT: CPT

## 2024-11-05 PROCEDURE — 80053 COMPREHEN METABOLIC PANEL: CPT

## 2024-11-05 PROCEDURE — 85025 COMPLETE CBC W/AUTO DIFF WBC: CPT

## 2024-11-05 PROCEDURE — 71046 X-RAY EXAM CHEST 2 VIEWS: CPT

## 2024-11-05 RX ORDER — ACETAMINOPHEN 500 MG/1
500 CAPSULE, LIQUID FILLED ORAL EVERY 8 HOURS
Qty: 180 | Refills: 0 | Status: ACTIVE | COMMUNITY
Start: 1900-01-01 | End: 1900-01-01

## 2024-11-05 RX ORDER — FUROSEMIDE 40 MG/1
40 TABLET ORAL
Qty: 30 | Refills: 0 | Status: DISCONTINUED | COMMUNITY
End: 2024-11-05

## 2024-11-05 RX ORDER — POTASSIUM CHLORIDE 1500 MG/1
20 TABLET, EXTENDED RELEASE ORAL DAILY
Qty: 30 | Refills: 0 | Status: DISCONTINUED | COMMUNITY
End: 2024-11-05

## 2024-11-05 RX ORDER — METHYLPREDNISOLONE 4 MG/1
4 TABLET ORAL
Qty: 1 | Refills: 0 | Status: COMPLETED | COMMUNITY
End: 2024-11-05

## 2024-11-05 RX ORDER — GABAPENTIN 100 MG/1
100 CAPSULE ORAL TWICE DAILY
Qty: 60 | Refills: 0 | Status: ACTIVE | COMMUNITY
Start: 1900-01-01 | End: 1900-01-01

## 2024-11-05 RX ORDER — IBUPROFEN 400 MG/1
400 TABLET, FILM COATED ORAL 3 TIMES DAILY
Qty: 30 | Refills: 2 | Status: ACTIVE | COMMUNITY
Start: 1900-01-01 | End: 1900-01-01

## 2024-12-03 ENCOUNTER — NON-APPOINTMENT (OUTPATIENT)
Age: 59
End: 2024-12-03

## 2024-12-03 ENCOUNTER — OUTPATIENT (OUTPATIENT)
Dept: OUTPATIENT SERVICES | Facility: HOSPITAL | Age: 59
LOS: 1 days | End: 2024-12-03
Payer: COMMERCIAL

## 2024-12-03 ENCOUNTER — APPOINTMENT (OUTPATIENT)
Dept: CARDIOTHORACIC SURGERY | Facility: CLINIC | Age: 59
End: 2024-12-03
Payer: COMMERCIAL

## 2024-12-03 VITALS
WEIGHT: 135 LBS | HEIGHT: 67 IN | TEMPERATURE: 96.7 F | SYSTOLIC BLOOD PRESSURE: 102 MMHG | OXYGEN SATURATION: 98 % | HEART RATE: 88 BPM | DIASTOLIC BLOOD PRESSURE: 55 MMHG | BODY MASS INDEX: 21.19 KG/M2

## 2024-12-03 DIAGNOSIS — Z98.890 OTHER SPECIFIED POSTPROCEDURAL STATES: Chronic | ICD-10-CM

## 2024-12-03 DIAGNOSIS — Z09 ENCOUNTER FOR FOLLOW-UP EXAMINATION AFTER COMPLETED TREATMENT FOR CONDITIONS OTHER THAN MALIGNANT NEOPLASM: ICD-10-CM

## 2024-12-03 DIAGNOSIS — Z95.3 PRESENCE OF XENOGENIC HEART VALVE: ICD-10-CM

## 2024-12-03 PROCEDURE — 71046 X-RAY EXAM CHEST 2 VIEWS: CPT | Mod: 26

## 2024-12-03 PROCEDURE — 99024 POSTOP FOLLOW-UP VISIT: CPT

## 2024-12-03 PROCEDURE — 71046 X-RAY EXAM CHEST 2 VIEWS: CPT

## 2024-12-04 RX ORDER — SIMVASTATIN 20 MG/1
20 TABLET, FILM COATED ORAL
Qty: 30 | Refills: 3 | Status: ACTIVE | COMMUNITY

## 2025-04-03 ENCOUNTER — APPOINTMENT (OUTPATIENT)
Dept: CT IMAGING | Facility: HOSPITAL | Age: 60
End: 2025-04-03

## 2025-04-03 ENCOUNTER — OUTPATIENT (OUTPATIENT)
Dept: OUTPATIENT SERVICES | Facility: HOSPITAL | Age: 60
LOS: 1 days | End: 2025-04-03
Payer: COMMERCIAL

## 2025-04-03 DIAGNOSIS — Z98.890 OTHER SPECIFIED POSTPROCEDURAL STATES: Chronic | ICD-10-CM

## 2025-04-03 PROCEDURE — 75572 CT HRT W/3D IMAGE: CPT | Mod: 26

## 2025-04-03 PROCEDURE — 75572 CT HRT W/3D IMAGE: CPT

## 2025-04-03 PROCEDURE — 82565 ASSAY OF CREATININE: CPT

## 2025-07-21 ENCOUNTER — APPOINTMENT (OUTPATIENT)
Dept: CARDIOTHORACIC SURGERY | Facility: CLINIC | Age: 60
End: 2025-07-21

## 2025-07-21 VITALS
HEART RATE: 73 BPM | TEMPERATURE: 98 F | SYSTOLIC BLOOD PRESSURE: 117 MMHG | WEIGHT: 138.01 LBS | DIASTOLIC BLOOD PRESSURE: 68 MMHG | OXYGEN SATURATION: 99 % | HEIGHT: 67 IN

## 2025-07-23 ENCOUNTER — OUTPATIENT (OUTPATIENT)
Dept: OUTPATIENT SERVICES | Facility: HOSPITAL | Age: 60
LOS: 1 days | End: 2025-07-23
Payer: COMMERCIAL

## 2025-07-23 DIAGNOSIS — Z98.890 OTHER SPECIFIED POSTPROCEDURAL STATES: Chronic | ICD-10-CM

## 2025-07-23 LAB
A1C WITH ESTIMATED AVERAGE GLUCOSE RESULT: 6.8 % — HIGH (ref 4–5.6)
ALBUMIN SERPL ELPH-MCNC: 4.6 G/DL — SIGNIFICANT CHANGE UP (ref 3.3–5)
ALP SERPL-CCNC: 64 U/L — SIGNIFICANT CHANGE UP (ref 40–120)
ALT FLD-CCNC: 9 U/L — LOW (ref 10–45)
ANION GAP SERPL CALC-SCNC: 11 MMOL/L — SIGNIFICANT CHANGE UP (ref 5–17)
APTT BLD: 26 SEC — LOW (ref 26.1–36.8)
AST SERPL-CCNC: 17 U/L — SIGNIFICANT CHANGE UP (ref 10–40)
BASOPHILS # BLD AUTO: 0.08 K/UL — SIGNIFICANT CHANGE UP (ref 0–0.2)
BASOPHILS NFR BLD AUTO: 1.3 % — SIGNIFICANT CHANGE UP (ref 0–2)
BILIRUB SERPL-MCNC: 0.4 MG/DL — SIGNIFICANT CHANGE UP (ref 0.2–1.2)
BUN SERPL-MCNC: 21 MG/DL — SIGNIFICANT CHANGE UP (ref 7–23)
CALCIUM SERPL-MCNC: 9.6 MG/DL — SIGNIFICANT CHANGE UP (ref 8.4–10.5)
CHLORIDE SERPL-SCNC: 104 MMOL/L — SIGNIFICANT CHANGE UP (ref 96–108)
CHOLEST SERPL-MCNC: 142 MG/DL — SIGNIFICANT CHANGE UP
CK MB CFR SERPL CALC: 1.4 NG/ML — SIGNIFICANT CHANGE UP (ref 0–6.7)
CK SERPL-CCNC: 97 U/L — SIGNIFICANT CHANGE UP (ref 30–200)
CO2 SERPL-SCNC: 27 MMOL/L — SIGNIFICANT CHANGE UP (ref 22–31)
CREAT SERPL-MCNC: 0.84 MG/DL — SIGNIFICANT CHANGE UP (ref 0.5–1.3)
EGFR: 101 ML/MIN/1.73M2 — SIGNIFICANT CHANGE UP
EGFR: 101 ML/MIN/1.73M2 — SIGNIFICANT CHANGE UP
EOSINOPHIL # BLD AUTO: 0.16 K/UL — SIGNIFICANT CHANGE UP (ref 0–0.5)
EOSINOPHIL NFR BLD AUTO: 2.5 % — SIGNIFICANT CHANGE UP (ref 0–6)
ESTIMATED AVERAGE GLUCOSE: 148 MG/DL — HIGH (ref 68–114)
GLUCOSE SERPL-MCNC: 136 MG/DL — HIGH (ref 70–99)
HCT VFR BLD CALC: 39.9 % — SIGNIFICANT CHANGE UP (ref 39–50)
HDLC SERPL-MCNC: 50 MG/DL — SIGNIFICANT CHANGE UP
HGB BLD-MCNC: 13.2 G/DL — SIGNIFICANT CHANGE UP (ref 13–17)
IMM GRANULOCYTES # BLD AUTO: 0.02 K/UL — SIGNIFICANT CHANGE UP (ref 0–0.07)
IMM GRANULOCYTES NFR BLD AUTO: 0.3 % — SIGNIFICANT CHANGE UP (ref 0–0.9)
INR BLD: 0.94 — SIGNIFICANT CHANGE UP (ref 0.85–1.16)
LDLC SERPL-MCNC: 67 MG/DL — SIGNIFICANT CHANGE UP
LIPID PNL WITH DIRECT LDL SERPL: 67 MG/DL — SIGNIFICANT CHANGE UP
LYMPHOCYTES # BLD AUTO: 1.83 K/UL — SIGNIFICANT CHANGE UP (ref 1–3.3)
LYMPHOCYTES NFR BLD AUTO: 28.8 % — SIGNIFICANT CHANGE UP (ref 13–44)
MAGNESIUM SERPL-MCNC: 2.4 MG/DL — SIGNIFICANT CHANGE UP (ref 1.6–2.6)
MCHC RBC-ENTMCNC: 33.1 G/DL — SIGNIFICANT CHANGE UP (ref 32–36)
MCHC RBC-ENTMCNC: 33.2 PG — SIGNIFICANT CHANGE UP (ref 27–34)
MCV RBC AUTO: 100.5 FL — HIGH (ref 80–100)
MONOCYTES # BLD AUTO: 0.74 K/UL — SIGNIFICANT CHANGE UP (ref 0–0.9)
MONOCYTES NFR BLD AUTO: 11.7 % — SIGNIFICANT CHANGE UP (ref 2–14)
NEUTROPHILS # BLD AUTO: 3.52 K/UL — SIGNIFICANT CHANGE UP (ref 1.8–7.4)
NEUTROPHILS NFR BLD AUTO: 55.4 % — SIGNIFICANT CHANGE UP (ref 43–77)
NONHDLC SERPL-MCNC: 92 MG/DL — SIGNIFICANT CHANGE UP
NRBC # BLD AUTO: 0 K/UL — SIGNIFICANT CHANGE UP (ref 0–0)
NRBC # FLD: 0 K/UL — SIGNIFICANT CHANGE UP (ref 0–0)
NRBC BLD AUTO-RTO: 0 /100 WBCS — SIGNIFICANT CHANGE UP (ref 0–0)
PLATELET # BLD AUTO: 229 K/UL — SIGNIFICANT CHANGE UP (ref 150–400)
PMV BLD: 9.5 FL — SIGNIFICANT CHANGE UP (ref 7–13)
POTASSIUM SERPL-MCNC: 4.1 MMOL/L — SIGNIFICANT CHANGE UP (ref 3.5–5.3)
POTASSIUM SERPL-SCNC: 4.1 MMOL/L — SIGNIFICANT CHANGE UP (ref 3.5–5.3)
PROT SERPL-MCNC: 8.2 G/DL — SIGNIFICANT CHANGE UP (ref 6–8.3)
PROTHROM AB SERPL-ACNC: 11 SEC — SIGNIFICANT CHANGE UP (ref 9.9–13.4)
RBC # BLD: 3.97 M/UL — LOW (ref 4.2–5.8)
RBC # FLD: 12.7 % — SIGNIFICANT CHANGE UP (ref 10.3–14.5)
SODIUM SERPL-SCNC: 142 MMOL/L — SIGNIFICANT CHANGE UP (ref 135–145)
TRIGL SERPL-MCNC: 148 MG/DL — SIGNIFICANT CHANGE UP
WBC # BLD: 6.35 K/UL — SIGNIFICANT CHANGE UP (ref 3.8–10.5)
WBC # FLD AUTO: 6.35 K/UL — SIGNIFICANT CHANGE UP (ref 3.8–10.5)

## 2025-07-23 PROCEDURE — 83036 HEMOGLOBIN GLYCOSYLATED A1C: CPT

## 2025-07-23 PROCEDURE — 85610 PROTHROMBIN TIME: CPT

## 2025-07-23 PROCEDURE — 82550 ASSAY OF CK (CPK): CPT

## 2025-07-23 PROCEDURE — 83735 ASSAY OF MAGNESIUM: CPT

## 2025-07-23 PROCEDURE — 93460 R&L HRT ART/VENTRICLE ANGIO: CPT | Mod: 26

## 2025-07-23 PROCEDURE — 80061 LIPID PANEL: CPT

## 2025-07-23 PROCEDURE — 82803 BLOOD GASES ANY COMBINATION: CPT

## 2025-07-23 PROCEDURE — 82962 GLUCOSE BLOOD TEST: CPT

## 2025-07-23 PROCEDURE — 80053 COMPREHEN METABOLIC PANEL: CPT

## 2025-07-23 PROCEDURE — 85730 THROMBOPLASTIN TIME PARTIAL: CPT

## 2025-07-23 PROCEDURE — 36415 COLL VENOUS BLD VENIPUNCTURE: CPT

## 2025-07-23 PROCEDURE — 93460 R&L HRT ART/VENTRICLE ANGIO: CPT

## 2025-07-23 PROCEDURE — 82553 CREATINE MB FRACTION: CPT

## 2025-07-23 PROCEDURE — 85025 COMPLETE CBC W/AUTO DIFF WBC: CPT

## 2025-07-23 RX ORDER — ASPIRIN 325 MG
81 TABLET ORAL DAILY
Refills: 0 | Status: DISCONTINUED | OUTPATIENT
Start: 2025-07-23 | End: 2025-07-23

## 2025-07-23 RX ORDER — METOPROLOL SUCCINATE 50 MG/1
1 TABLET, EXTENDED RELEASE ORAL
Refills: 0 | DISCHARGE

## 2025-07-24 DIAGNOSIS — I35.2 NONRHEUMATIC AORTIC (VALVE) STENOSIS WITH INSUFFICIENCY: ICD-10-CM

## 2025-07-24 DIAGNOSIS — I27.20 PULMONARY HYPERTENSION, UNSPECIFIED: ICD-10-CM

## 2025-09-15 ENCOUNTER — APPOINTMENT (OUTPATIENT)
Dept: CARDIOTHORACIC SURGERY | Facility: CLINIC | Age: 60
End: 2025-09-15

## (undated) DEVICE — PREP SCRUB BRUSH W CHG 4%

## (undated) DEVICE — SUT ETHIBOND 0 18" TIES

## (undated) DEVICE — DRSG BIOPATCH DISK W CHG 1" W 4.0MM HOLE

## (undated) DEVICE — RIGID ADULT SUCKER

## (undated) DEVICE — CATH CV TRAY INSR ST UNIV

## (undated) DEVICE — SUMP INTRACARDIAC/PERICARDIAL 20FR 1/4" ADULT

## (undated) DEVICE — DRSG RESTRAINT LIMB WRAP AROUND

## (undated) DEVICE — SUT BOOT STANDARD (ORIGINAL YELLOW) 5 PAIR

## (undated) DEVICE — SUT PERMAHAND 1 10-30"

## (undated) DEVICE — XI SEAL UNIVERSIAL 5-12MM

## (undated) DEVICE — Device

## (undated) DEVICE — ELCTR STRYKER NEPTUNE SMOKE EVACUATION PENCIL (GREEN)

## (undated) DEVICE — CONNECTOR STRAIGHT 3/8 X 3/8"

## (undated) DEVICE — DVC ASCOPE 4 SNGL USE SLIM

## (undated) DEVICE — BLADE SURGICAL #15 CARBON

## (undated) DEVICE — SUT MONOCRYL 4-0 27" PS-2 UNDYED

## (undated) DEVICE — SYR LUER LOK 30CC

## (undated) DEVICE — DRSG DERMABOND 0.7ML

## (undated) DEVICE — GLV 7 PROTEXIS (WHITE)

## (undated) DEVICE — SUCTION CATH ARGYLE WHISTLE TIP 14FR STRAIGHT PACKED

## (undated) DEVICE — SUT VICRYL 0 27" CT-3

## (undated) DEVICE — SUT PROLENE 3-0 36" SH

## (undated) DEVICE — KNOT PUSHER MIAMI INSTRUMENTS DISP

## (undated) DEVICE — SUT SILK 5-0 60" TIES

## (undated) DEVICE — INSERT DBL TRACTION 66MM

## (undated) DEVICE — SUT VICRYL 2-0 27" CT-1

## (undated) DEVICE — SUT TICRON 2-0 36" CV-316 DA

## (undated) DEVICE — CHEST DRAIN PLEUR-EVAC DRY/WET ADULT-PEDS SINGLE (QUICK)

## (undated) DEVICE — TOURNIQUET SET 12FR (1 RED, 1 BLUE, 3 CLEAR, 1 SNARE) 7"

## (undated) DEVICE — SUT VICRYL 1 36" CTX UNDYED

## (undated) DEVICE — SUT PROLENE 5-0 18" RB-1

## (undated) DEVICE — DRSG CURITY GAUZE SPONGE 4 X 4" 12-PLY

## (undated) DEVICE — DRAPE FLUID WARMER 44 X 66"

## (undated) DEVICE — D HELP - CLEARVIEW CLEARIFY SYSTEM

## (undated) DEVICE — ELCTR BOVIE TIP BLADE INSULATED 4" EDGE

## (undated) DEVICE — VASCULAR DILATOR KIT 8,12,16,20, 24FR

## (undated) DEVICE — PACING CABLE (BROWN) A/V TEMP SCREW DOWN 12FT

## (undated) DEVICE — SUT ETHIBOND 2-0 36" SH2 TP

## (undated) DEVICE — XI DRAPE ARM

## (undated) DEVICE — SUT VICRYL 0 27" CT

## (undated) DEVICE — CATH NG SALEM SUMP 16FR

## (undated) DEVICE — FOLEY TRAY 16FR 5CC LF LUBRISIL ADVANCE TEMP CLOSED

## (undated) DEVICE — TUBING SUCTION NONCONDUCTIVE 6MM X 12FT

## (undated) DEVICE — DRAPE PROBE COVER 5" X 96"

## (undated) DEVICE — CATH TRIOX OXIMETRY 8F 3 LUMENS

## (undated) DEVICE — PACK OPEN HEART LNX

## (undated) DEVICE — VESSEL LOOP MAXI-BLUE 0.120" X 16"

## (undated) DEVICE — DRAIN RESERVOIR FOR JACKSON PRATT 100CC CARDINAL

## (undated) DEVICE — POSITIONER FOAM EGG CRATE ULNAR 2PCS (PINK)

## (undated) DEVICE — PACK PROC CV DRAPE

## (undated) DEVICE — VENTING ADAPTER "Y" (RED/BLUE) 7.5"

## (undated) DEVICE — SUCTION YANKAUER BULBOUS TIP NO VENT

## (undated) DEVICE — TROCAR COVIDIEN ENDO CLOSE SUTURING DEVICE

## (undated) DEVICE — SUT NUROLON 1 18" OS-8 (POP-OFF)

## (undated) DEVICE — DRSG TRACH DRAINAGE 4X4

## (undated) DEVICE — TOURNIQUET SET TOURNIKWIK 12FR (2 TUBES, 1 SNARE) 7.5"

## (undated) DEVICE — SUT PROLENE 4-0 36" RB-1

## (undated) DEVICE — BLADE SURGICAL #11 CARBON

## (undated) DEVICE — XI DRAPE COLUMN

## (undated) DEVICE — DRAPE TOWEL BLUE 17" X 24"

## (undated) DEVICE — SPECIMEN CONTAINER 100ML

## (undated) DEVICE — NDL HYPO SAFE 22G X 1.5" (BLACK)

## (undated) DEVICE — VENODYNE/SCD SLEEVE CALF MEDIUM

## (undated) DEVICE — MARKING PEN W RULER

## (undated) DEVICE — TUBING SMOKE EVAC 3/8" X 10FT FOR NEPTUNE

## (undated) DEVICE — TOURNIQUET SET TOURNIKWIK 12FR (2 TUBES, 1 SNARE) 6"

## (undated) DEVICE — XI TIP COVER

## (undated) DEVICE — SUT PROLENE 6-0 30" RB-2

## (undated) DEVICE — GOWN TRIMAX XXL

## (undated) DEVICE — ELCTR BOVIE TIP BLADE INSULATED 6.5" EDGE

## (undated) DEVICE — DRSG TEGADERM 4X4.75"

## (undated) DEVICE — SOL ANTI FOG